# Patient Record
Sex: FEMALE | Race: OTHER | NOT HISPANIC OR LATINO | Employment: FULL TIME | URBAN - METROPOLITAN AREA
[De-identification: names, ages, dates, MRNs, and addresses within clinical notes are randomized per-mention and may not be internally consistent; named-entity substitution may affect disease eponyms.]

---

## 2018-09-21 LAB
INFLUENZA A ANTIGEN, POC: NEGATIVE
INFLUENZA B ANTIGEN, POC: NEGATIVE
RAPID GROUP A STREP (OHS): NEGATIVE

## 2019-03-26 LAB
INFLUENZA A ANTIGEN, POC: NEGATIVE
INFLUENZA B ANTIGEN, POC: NEGATIVE

## 2019-07-31 ENCOUNTER — HISTORICAL (OUTPATIENT)
Dept: URGENT CARE | Facility: CLINIC | Age: 49
End: 2019-07-31

## 2019-07-31 LAB
APPEARANCE, UA: CLEAR
BACTERIA SPEC CULT: ABNORMAL /HPF
BILIRUB SERPL-MCNC: NEGATIVE MG/DL
BILIRUB UR QL STRIP: NEGATIVE
BLOOD URINE, POC: NORMAL
COLOR UR: YELLOW
COLOR, POC UA: YELLOW
GLUCOSE (UA): NEGATIVE
GLUCOSE UR QL STRIP: NEGATIVE
HGB UR QL STRIP: ABNORMAL
KETONES UR QL STRIP: NEGATIVE
KETONES UR QL STRIP: NEGATIVE
LEUKOCYTE EST, POC UA: NEGATIVE
LEUKOCYTE ESTERASE UR QL STRIP: NEGATIVE
NITRITE UR QL STRIP: NEGATIVE
NITRITE, POC UA: NEGATIVE
PH UR STRIP: 5 [PH] (ref 5–9)
PH, POC UA: 5
PROT UR QL STRIP: NEGATIVE
PROTEIN, POC: NEGATIVE
RBC #/AREA URNS HPF: ABNORMAL /[HPF]
SP GR UR STRIP: 1.02 (ref 1–1.03)
SPECIFIC GRAVITY, POC UA: 1.01
SQUAMOUS EPITHELIAL, UA: ABNORMAL
UROBILINOGEN UR STRIP-ACNC: 0.2
UROBILINOGEN, POC UA: NORMAL
WBC #/AREA URNS HPF: ABNORMAL /[HPF]

## 2020-03-03 LAB
BILIRUB SERPL-MCNC: NEGATIVE MG/DL
BLOOD URINE, POC: NEGATIVE
CLARITY, POC UA: CLEAR
COLOR, POC UA: NORMAL
GLUCOSE UR QL STRIP: NEGATIVE
INFLUENZA A ANTIGEN, POC: NEGATIVE
INFLUENZA B ANTIGEN, POC: NEGATIVE
KETONES UR QL STRIP: NEGATIVE
LEUKOCYTE EST, POC UA: NEGATIVE
NITRITE, POC UA: NEGATIVE
PH, POC UA: 6
PROTEIN, POC: NORMAL
SPECIFIC GRAVITY, POC UA: 1.02
UROBILINOGEN, POC UA: NORMAL

## 2020-10-29 ENCOUNTER — HISTORICAL (OUTPATIENT)
Dept: RADIOLOGY | Facility: HOSPITAL | Age: 50
End: 2020-10-29

## 2020-11-02 ENCOUNTER — HISTORICAL (OUTPATIENT)
Dept: ADMINISTRATIVE | Facility: HOSPITAL | Age: 50
End: 2020-11-02

## 2020-11-04 ENCOUNTER — HISTORICAL (OUTPATIENT)
Dept: RADIOLOGY | Facility: HOSPITAL | Age: 50
End: 2020-11-04

## 2020-11-11 ENCOUNTER — HISTORICAL (OUTPATIENT)
Dept: RADIOLOGY | Facility: HOSPITAL | Age: 50
End: 2020-11-11

## 2021-01-28 ENCOUNTER — HISTORICAL (OUTPATIENT)
Dept: SURGERY | Facility: HOSPITAL | Age: 51
End: 2021-01-28

## 2021-01-28 LAB — POC BETA-HCG (QUAL): NEGATIVE

## 2021-07-30 ENCOUNTER — HISTORICAL (OUTPATIENT)
Dept: ADMINISTRATIVE | Facility: HOSPITAL | Age: 51
End: 2021-07-30

## 2021-07-30 LAB — SARS-COV-2 RNA RESP QL NAA+PROBE: NOT DETECTED

## 2021-08-23 LAB — SARS-COV-2 RNA RESP QL NAA+PROBE: NOT DETECTED

## 2021-08-24 ENCOUNTER — HISTORICAL (OUTPATIENT)
Dept: ADMINISTRATIVE | Facility: HOSPITAL | Age: 51
End: 2021-08-24

## 2021-10-16 ENCOUNTER — HISTORICAL (OUTPATIENT)
Dept: LAB | Facility: HOSPITAL | Age: 51
End: 2021-10-16

## 2021-10-16 LAB — TSH SERPL-ACNC: 0.73 UIU/ML (ref 0.35–4.94)

## 2021-11-12 ENCOUNTER — HISTORICAL (OUTPATIENT)
Dept: RADIOLOGY | Facility: HOSPITAL | Age: 51
End: 2021-11-12

## 2021-12-30 ENCOUNTER — HISTORICAL (OUTPATIENT)
Dept: LAB | Facility: HOSPITAL | Age: 51
End: 2021-12-30

## 2022-04-10 ENCOUNTER — HISTORICAL (OUTPATIENT)
Dept: ADMINISTRATIVE | Facility: HOSPITAL | Age: 52
End: 2022-04-10

## 2022-04-29 VITALS
SYSTOLIC BLOOD PRESSURE: 126 MMHG | DIASTOLIC BLOOD PRESSURE: 91 MMHG | OXYGEN SATURATION: 97 % | WEIGHT: 236.13 LBS | HEIGHT: 65 IN | BODY MASS INDEX: 39.34 KG/M2

## 2022-04-30 NOTE — OP NOTE
Patient:   Jadyn Wilder             MRN: [id number]            FIN: [id number]               Age:   51 years     Sex:  Female     :  1970   Associated Diagnoses:   None   Author:   Ezekiel Emery Jr, MD      SURGEON: Ezekiel Emery MD   ASSISTANT: KURT Zayas.  Ms. 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  Left knee arthroscopic partial lateral and medial meniscectomy  2.  Left knee arthroscopic shaving chondroplasty of medial femoral condyle and patellofemoral compartment    ESTIMATED BLOOD LOSS: 10cc.    COMPLICATIONS: None.    TOURNIQUET TIME: About 20 minutes.    ANTIBIOTICS: Ancef     INDICATIONS FOR PROCEDURE: Jadyn is a 51y.o. female who has had ongoing left knee pain. The patient has had to limit activity due to intermittent pain & occasional mechanical symptoms. An MRI was performed that showed a meniscus tear that I felt would be amenable to arthroscopic debridement. The patient decided to opt for surgery after failing conservative management.    OPERATIVE REPORT: Jadyn was initially seen in the preoperative holding area where history and physical were reviewed without change. The operative leg was marked, consents were reviewed, and any questions were answered for the patient and family. The patient was then taken back to the operating room, placed supine on the operating table with all bony prominences padded. Then a nonsterile tourniquet was placed around the upper thigh. The patient was induced under general anesthesia. The operative lower extremity was prepped and draped in standard sterile fashion. A timeout led by the surgeon was performed, and preoperative antibiotics were given. The limp was  exsanguinated with gravity. The tourniquet was raised to 100 mmHg over the systolic blood pressure.    The knee was then flexed and the inferolateral portal was created with an #11 blade scalpel.    The camera was introduced, using the blunt trocar, into the suprapatellar pouch. The undersurface of the patella was found to have grade 2 and 3 chondral wear and the trochlear groove was found to have grade 2 and 3 chondral wear . The camera was then taken down into the lateral gutter, where no loose bodies and no plica were found. The camera was then brought into the medial gutter, where no loose bodies and _ plica were seen. The camera was then brought into the medial compartment.    An inferomedial portal was then localized with a spinal needle, and created using an #11 blade. The medial meniscus was probed and found to have a complex tear involving the mid body. A combination of baskets and renita were used to debride the meniscal flap down to a stable margin of approximately 50 percent remaining and then shaver was used to smooth the edges. The medial femoral condyle was found to have scattered grade 2 and 3 cartilage wear.  Unstable flaps were abraded with a shaver and biter. The medial tibial plateau demonstrated minimal wear.    The camera was then turned to the notch, where the ACL was found to be intact.    Then the leg was brought into figure-of-four position. The camera was brought into the lateral compartment. The lateral meniscus was probed and found to have a parrot-beak tear of the anterior body. A combination of baskets and renita were used to debride the meniscal flap down to a stable margin of approximately 80 percent remaining and then shaver was used to smooth the edges. The lateral femoral condyle was found to have minimal chondral wear. The lateral tibial plateau had minimal chondral wear.    The camera was brought up into the patellofemoral joint once more, with the knee in extension and  shaving chondroplasty was performed on the patella and trochlea until smooth and stable cartilage margins were present.    The knee was once more examined for loose bodies, of which none were found. The knee was then evacuated of all fluids. The portals were closed with 3-0 monocyrl interrupted sutures and steristrips. 10mL of 0.25% Bupivicaine were infiltrated around each portal. A sterile dressing was placed, and the tourniquet was deflated after about 20 minutes. The patient was awakened from anesthesia and taken to the postoperative care unit in stable condition.

## 2022-05-02 NOTE — HISTORICAL [facility] CERNER
This is a historical note converted from Dannielle. Formatting and pictures may have been removed.  Please reference Dannielle for original formatting and attached multimedia. History of Present Illness  She is a pleasant 51-year-old whose had left knee pain?for greater than?1 year but increasing?since September 2020. ?She notes it worse with twisting?the knee. ?She is tried an injection in October 2020 with limited relief.? She has mechanical symptoms within the knee. ?She is tried anti-inflammatory medicines in the past. ?She denies any numbness or tingling [1]  ?   She returns today status post MRI. ?Her pains unchanged  Review of Systems  Comprehensive review of system?was performed with no exceptions other than noted in the history of present illness [1]  Physical Exam  Gen: WN, WD, NAD  Card/Res: NL breathing, +distal pulses  Abdomen: ND  Standing exam  stance: normal alignment, no significant leg-length discrepancy  gait:?Mild antalgic limp  ?   Knee examination  - General comments: unremarkable appearance  ?   - Tenderness: Lateral joint line  ?   Knee??????????RIGHT??????????LEFT  Skin: ??????????Intact ??????????Intact  ROM:??????????0-130??????????0-130  Effusion:????? Neg ???????????? +  MJL TTP:????? Neg ???????????? Neg  LJL TTP: ?????? Neg ???????????? +  Amara:? ?Neg ????????????+  Pat crep:?????? Neg ???????????????Neg  Patella TTPs: Neg ???????????????Neg  Patella grind: Neg??????????? ?Neg  Lachman: ?????Neg ????????????????????Neg  Pivot shift: ?????Neg ???????????? Neg  Valgus stress: Neg ???????????????Neg  Varus stress: Neg ???????????????Neg  Posterior drawer: Neg ??????????Neg  ?   N-V ????????????????????intact??????????intact  Hip:?????????????????????????nml?????????? nml  ?   Lower extremity edema:Negative [2]  Assessment/Plan  1.?Tear of lateral meniscus of left knee?S83.282A  ??I recommend surgical intervention: Left knee arthroscopic partial lateral meniscectomy. ?We discussed the  details of the procedure and the expected postoperative course.? We discussed the benefits of surgery which be to decrease?her knee pain and increase her function. ?We discussed the risk of surgery which is small but could be significant?if she has continued pain, stiffness, or infection.? After discussion she like to proceed. [3]   Problem List/Past Medical History  Ongoing  2019-nCoV  Bilateral chronic conjunctivitis of eyes  Mitral valve regurgitation  Obesity  Tear of lateral meniscus of left knee  Historical  Tobacco user  Procedure/Surgical History  Tubal ligation   Medications  Inpatient  No active inpatient medications  Home  ALPRAZOLAM 0.5 MG TABLET, 0.5 mg= 1 tab(s), Oral, Daily  cholecalciferol 50,000 intl units oral capsule, 01867 IntUnit= 1 cap(s), Oral, qWeek,? ?Not Taking per Prescriber: Last Dose Date/Time Unknown  FLUOXETINE HCL 20 MG CAPSULE, 20 mg= 1 cap(s), Oral, Daily  meloxicam 7.5 mg oral tablet, See Instructions, 1 refills  ProAir HFA 90 mcg/inh inhalation aerosol with adapter, 1 puff(s), INH, q4hr, PRN, 3 refills,? ?Not taking: Last Dose Date/Time Unknown  Allergies  No Known Allergies  No Known Medication Allergies  Social History  Abuse/Neglect  No, 01/13/2021  Alcohol  Current, 1-2 times per month, 09/21/2018  Substance Use  Never, 09/21/2018  Tobacco  Former smoker, quit more than 30 days ago, N/A, 01/13/2021  Family History  Diabetes mellitus type 2: Mother and Grandfather.  Hypertension.: Mother and Father.  Immunizations  Vaccine Date Status Comments   COVID-19 MRNA, LNP-S, PF- Pfizer 01/20/2021 Given ADMINISTERED BY SILVER ARMAS LPN.      [1]?Office Visit Note; Ezekiel Emery JR., MD 11/16/2020 08:49 CST  [2]?Office Visit Note; Ezekiel Emery JR., MD 11/16/2020 08:49 CST  [3] Office Visit Note; Ezekiel Emery JR., MD 11/16/2020 08:49 CST

## 2022-06-02 ENCOUNTER — HOSPITAL ENCOUNTER (OUTPATIENT)
Dept: RADIOLOGY | Facility: HOSPITAL | Age: 52
Discharge: HOME | End: 2022-06-02
Payer: COMMERCIAL

## 2022-06-02 ENCOUNTER — HOSPITAL ENCOUNTER (OUTPATIENT)
Dept: RADIOLOGY | Facility: HOSPITAL | Age: 52
Discharge: HOME | End: 2022-06-02
Attending: STUDENT IN AN ORGANIZED HEALTH CARE EDUCATION/TRAINING PROGRAM
Payer: COMMERCIAL

## 2022-06-02 DIAGNOSIS — M54.59 OTHER LOW BACK PAIN: ICD-10-CM

## 2022-06-02 DIAGNOSIS — M54.2 NECK PAIN: ICD-10-CM

## 2022-06-02 DIAGNOSIS — R31.9 URINE BLOOD: Primary | ICD-10-CM

## 2022-06-02 DIAGNOSIS — R31.9 URINE BLOOD: ICD-10-CM

## 2022-06-02 DIAGNOSIS — M54.2 NECK PAIN: Primary | ICD-10-CM

## 2022-06-02 PROCEDURE — 72070 X-RAY EXAM THORAC SPINE 2VWS: CPT | Mod: TC

## 2022-06-02 PROCEDURE — 74176 CT ABD & PELVIS W/O CONTRAST: CPT | Mod: TC

## 2022-06-02 PROCEDURE — 72100 X-RAY EXAM L-S SPINE 2/3 VWS: CPT | Mod: TC

## 2022-06-02 PROCEDURE — 72040 X-RAY EXAM NECK SPINE 2-3 VW: CPT | Mod: TC

## 2022-06-19 ENCOUNTER — HOSPITAL ENCOUNTER (EMERGENCY)
Facility: HOSPITAL | Age: 52
Discharge: HOME | End: 2022-06-19
Attending: EMERGENCY MEDICINE
Payer: COMMERCIAL

## 2022-06-19 VITALS
WEIGHT: 230 LBS | HEIGHT: 65 IN | BODY MASS INDEX: 38.32 KG/M2 | SYSTOLIC BLOOD PRESSURE: 134 MMHG | OXYGEN SATURATION: 100 % | DIASTOLIC BLOOD PRESSURE: 96 MMHG | RESPIRATION RATE: 15 BRPM | TEMPERATURE: 98 F | HEART RATE: 99 BPM

## 2022-06-19 DIAGNOSIS — M25.579 ANKLE PAIN: ICD-10-CM
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
## 2022-06-19 DIAGNOSIS — S82.841A BIMALLEOLAR ANKLE FRACTURE, RIGHT, CLOSED, INITIAL ENCOUNTER: Primary | ICD-10-CM

## 2022-06-19 PROCEDURE — 96374 THER/PROPH/DIAG INJ IV PUSH: CPT

## 2022-06-19 PROCEDURE — 99285 EMERGENCY DEPT VISIT HI MDM: CPT | Mod: 25

## 2022-06-19 PROCEDURE — 99152 MOD SED SAME PHYS/QHP 5/>YRS: CPT

## 2022-06-19 PROCEDURE — 63600175 PHARM REV CODE 636 W HCPCS: Performed by: EMERGENCY MEDICINE

## 2022-06-19 PROCEDURE — 27810 TREATMENT OF ANKLE FRACTURE: CPT | Mod: RT

## 2022-06-19 RX ORDER — PROPOFOL 10 MG/ML
150 VIAL (ML) INTRAVENOUS ONCE
Status: COMPLETED | OUTPATIENT
Start: 2022-06-19 | End: 2022-06-19

## 2022-06-19 RX ORDER — HYDROCODONE BITARTRATE AND ACETAMINOPHEN 7.5; 325 MG/1; MG/1
1 TABLET ORAL EVERY 4 HOURS PRN
Qty: 18 TABLET | Refills: 0 | Status: ON HOLD | OUTPATIENT
Start: 2022-06-19 | End: 2022-06-23 | Stop reason: HOSPADM

## 2022-06-19 RX ORDER — PROPOFOL 10 MG/ML
100 VIAL (ML) INTRAVENOUS ONCE
Status: DISCONTINUED | OUTPATIENT
Start: 2022-06-19 | End: 2022-06-19

## 2022-06-19 RX ORDER — CEFAZOLIN SODIUM 1 G/3ML
1 INJECTION, POWDER, FOR SOLUTION INTRAMUSCULAR; INTRAVENOUS
Status: COMPLETED | OUTPATIENT
Start: 2022-06-19 | End: 2022-06-19

## 2022-06-19 RX ORDER — ONDANSETRON 4 MG/1
4 TABLET, FILM COATED ORAL EVERY 6 HOURS PRN
Qty: 12 TABLET | Refills: 0 | Status: SHIPPED | OUTPATIENT
Start: 2022-06-19 | End: 2023-02-03

## 2022-06-19 RX ORDER — IBUPROFEN 800 MG/1
800 TABLET ORAL EVERY 8 HOURS PRN
Qty: 20 TABLET | Refills: 0 | Status: ON HOLD | OUTPATIENT
Start: 2022-06-19 | End: 2022-06-23 | Stop reason: HOSPADM

## 2022-06-19 RX ADMIN — CEFAZOLIN 1 G: 330 INJECTION, POWDER, FOR SOLUTION INTRAMUSCULAR; INTRAVENOUS at 12:06

## 2022-06-19 RX ADMIN — PROPOFOL 150 MG: 10 INJECTION, EMULSION INTRAVENOUS at 02:06

## 2022-06-19 NOTE — ED PROVIDER NOTES
Encounter Date: 6/19/2022       History     Chief Complaint   Patient presents with    Fall    Ankle Pain     52-year-old female was walking down stairs when she fell injuring her right ankle.  She denies any injuries to any other part of her body and just has ankle pain.  She states that she has been nibbling and eating continuously this evening, and has drink some alcohol.  She denies any illicit drugs.  She has no medical problems, has had a bilateral tubal ligation so she is not pregnant, and states she has had no problems with anesthesia in the past.        Review of patient's allergies indicates:  No Known Allergies  No past medical history on file.  No past surgical history on file.  No family history on file.     Review of Systems   Musculoskeletal: Positive for arthralgias.   All other systems reviewed and are negative.      Physical Exam     Initial Vitals [06/19/22 0018]   BP Pulse Resp Temp SpO2   115/73 100 18 98.2 °F (36.8 °C) 98 %      MAP       --         Physical Exam    Nursing note and vitals reviewed.  Constitutional: She appears well-developed and well-nourished. She is not diaphoretic. No distress.   HENT:   Head: Normocephalic and atraumatic.   Mouth/Throat: Oropharynx is clear and moist.   Eyes: Conjunctivae are normal. Pupils are equal, round, and reactive to light.   Neck: Neck supple.   Cardiovascular: Normal rate, regular rhythm, normal heart sounds and intact distal pulses.   Pulmonary/Chest: Breath sounds normal. No respiratory distress. She has no wheezes. She has no rhonchi. She has no rales.   Abdominal: Abdomen is soft. Bowel sounds are normal. She exhibits no distension. There is no abdominal tenderness. There is no guarding.   Musculoskeletal:         General: No tenderness or edema.      Cervical back: Neck supple.      Comments: Right ankle has deformity consistent with fracture dislocation.  2+ pedal pulse, normal sensation to the toes, brisk capillary refill.    Abrasion  "noted to the lateral ankle which does not appear to be an open fracture but it was cleaned, antibiotic ointment applied, patient given 1 dose of antibiotics.    No tenderness to the right foot or right lower leg.  No sign of any other extremity injury.     Neurological: She is alert and oriented to person, place, and time.   Skin: Skin is warm and dry. Capillary refill takes less than 2 seconds. No rash noted.   Psychiatric: She has a normal mood and affect. Thought content normal.         ED Course   Procedural Sedation        Date/Time: 2022 1:52 AM  Performed by: Emelyn Avalos MD  Authorized by: Emelyn Avalos MD   Consent Done: Yes  Consent: Verbal consent not obtained. Written consent obtained.  Risks and benefits: risks, benefits and alternatives were discussed  Consent given by: patient  Patient understanding: patient states understanding of the procedure being performed  Patient consent: the patient's understanding of the procedure matches consent given  Procedure consent: procedure consent matches procedure scheduled  Relevant documents: relevant documents present and verified  Site marked: the operative site was marked  Imaging studies: imaging studies available  Patient identity confirmed: , MRN, name, provided demographic data and verbally with patient  Time out: Immediately prior to procedure a "time out" was called to verify the correct patient, procedure, equipment, support staff and site/side marked as required.  ASA Class: Class 1 - Heathy patient. No medical history.  Mallampati Score: Class 1 - Visualization of the soft palate, fauces, uvula, and anterior/posterior pillars.   NPO STATUS:  Date/Time of last solid: 2022 2:45 AM  Contents of last solid: snacking all night  Date/Time of last fluid: 2022 2:45 AM  Contents of last fluid: beer    Equipment: on cardiac monitor., on BP monitor., on supplemental oxygen., suction available. and airway equipment available. "     Sedation type: moderate (conscious) sedation    Sedatives: propofol  Sedation start date/time: 2022 2:30 AM  Sedation end date/time: 2022 2:40 AM  Total Sedation Time (min): 10  Vitals: Vital signs were monitored during sedation.  Complications: No complications.   Comments: Patient was initially given 100 mg of propofol with effect.  We gave her another 50 mg of propofol with some sedation but she still continued to talk was awake but tolerated the procedure well.  Patient/Family history of anesthesia or sedation complications: No  Orthopedic Injury    Date/Time: 2022 2:30 AM  Performed by: Emelyn Avalos MD  Authorized by: Emelyn Avalos MD     Location procedure was performed:  [facility] EMERGENCY DEPARTMENT  Pre-operative diagnosis:  Right ankle fracture dislocation closed  Post-operative diagnosis:  Right ankle fracture dislocation closed  Consent Done?:  Yes  Universal Protocol:     Verbal consent obtained?: Yes      Written consent obtained?: Yes      Risks and benefits: Risks, benefits and alternatives were discussed      Consent given by:  Patient    Patient states understanding of procedure being performed: Yes      Patient's understanding of procedure matches consent: Yes      Procedure consent matches procedure scheduled: Yes      Relevant documents present and verified: Yes      Test results available and properly labeled: Yes      Imaging studies available: Yes      Patient identity confirmed:  , MRN, name, provided demographic data and verbally with patient  Injury:     Injury location:  Ankle      Pre-procedure assessment:     Distal perfusion: normal      Neurological function: normal      Local anesthesia used?: No      Patient sedated?: Yes      ASA Class:  Class 1 - Heathy patient. No medical history.    Mallampati Score:  Class 1 - Visualization of the soft palate, fauces, uvula, and anterior/posterior pillars.      Selections made in this section will also lock the  Injury type section above.:     Immobilization:  Splint    Technical Procedures Used:  Reduction was performed with traction and then pressure on the lateral malleolus and medial heel, post reduction x-ray showed good in alignment    Complications: No      Specimens: No      Implants: No    Post-procedure assessment:     Neurovascular status: Neurovascularly intact      Distal perfusion: normal      Neurological function: normal      Range of motion: splinted      Patient tolerance:  Patient tolerated the procedure well with no immediate complications     Posterior splint and stirrup splint applied for stability.      Labs Reviewed - No data to display       Imaging Results          X-Ray Ankle Complete Right (Final result)  Result time 06/19/22 06:43:21    Final result by Claudio Andrews MD (06/19/22 06:43:21)                 Impression:      Reduction of trimalleolar fracture dislocation with close to anatomical position and alignment of the visualized osseous structures.      Electronically signed by: Claudio Andrews  Date:    06/19/2022  Time:    06:43             Narrative:    EXAMINATION:  XR ANKLE COMPLETE 3 VIEW RIGHT    CLINICAL HISTORY:  Pain in unspecified ankle and joints of unspecified foot    COMPARISON:  None.    FINDINGS:  Reduction of fracture dislocation of the ankle with close to anatomical position and alignment of the visualized osseous structures                               X-Ray Ankle Complete Right (Final result)  Result time 06/19/22 06:42:21    Final result by Claudio Andrews MD (06/19/22 06:42:21)                 Impression:      Trimalleolar fracture dislocation with significant displacement and over riding of fracture fragments.      Electronically signed by: Claudio Andrews  Date:    06/19/2022  Time:    06:42             Narrative:    EXAMINATION:  XR ANKLE COMPLETE 3 VIEW RIGHT    CLINICAL HISTORY:  Unspecified fall, initial  encounter    COMPARISON:  None.    FINDINGS:  Comminuted displaced fracture dislocation with a tri malleolar component    Joint spaces preserved.    No blastic or lytic lesions.    Soft tissues within normal limits.                               X-Ray Foot Complete Right (Final result)  Result time 06/19/22 07:42:04    Final result by Claudio Andrews MD (06/19/22 07:42:04)                 Impression:      Trimalleolar fracture dislocation of the ankle    No other fractures.      Electronically signed by: Claudio Andrews  Date:    06/19/2022  Time:    07:42             Narrative:    EXAMINATION:  XR FOOT COMPLETE 3 VIEW RIGHT    CLINICAL HISTORY:  Unspecified fall, initial encounter    COMPARISON:  None.    FINDINGS:  Trimalleolar fracture dislocation of the ankle with no other significant fractures or dislocations identified    Joint spaces preserved.    No blastic or lytic lesions.    Soft tissues within normal limits.                                 Medications   ceFAZolin injection 1 g (1 g Intravenous Given 6/19/22 0057)   propofol (DIPRIVAN) 10 mg/mL IVP (150 mg Intravenous Given 6/19/22 0245)     Medical Decision Making:   Initial Assessment:   52-year-old female fell and has obvious left ankle deformity with abrasion over the lateral ankle, no bleeding, does not appear to be an open fracture  Differential Diagnosis:   Fracture, dislocation, open fracture  ED Management:  Patient was given 1 dose of antibiotics in the abrasion to her ankle was cleaned and bandaged.  Procedure old sedation was performed after consent and appropriate preparation and the fracture dislocation was reduced.  Splint was applied which was a posterior splint and a stirrup splint and post splint evaluation revealed good capillary refill to the toes, normal sensation, patient states the splint feels good.  After an appropriate amount of time passed in the sedation had completely worn off, patient was discharge.  Discharge  instructions were discussed and she will call the orthopedist on Monday to schedule her follow-up appointment.                      Clinical Impression:   Final diagnoses:  [W19.XXXA] Fall  [M25.579] Ankle pain  [S82.841A] Bimalleolar ankle fracture, right, closed, initial encounter (Primary)          ED Disposition Condition    Discharge Stable        ED Prescriptions     Medication Sig Dispense Start Date End Date Auth. Provider    ondansetron (ZOFRAN) 4 MG tablet Take 1 tablet (4 mg total) by mouth every 6 (six) hours as needed for Nausea. 12 tablet 6/19/2022  Emelyn Avalos MD    ibuprofen (ADVIL,MOTRIN) 800 MG tablet Take 1 tablet (800 mg total) by mouth every 8 (eight) hours as needed for Pain. 20 tablet 6/19/2022  Emelyn Avalos MD    HYDROcodone-acetaminophen (NORCO) 7.5-325 mg per tablet Take 1 tablet by mouth every 4 (four) hours as needed for Pain. 18 tablet 6/19/2022  Emelyn Avalos MD        Follow-up Information     Follow up With Specialties Details Why Contact Info    Bruce Mayberry MD Orthopedic Surgery Call in 2 days  [address]  [address]  [facility] [id number] [phone]             Emelyn Avalos MD  06/20/22 [id number]

## 2022-06-19 NOTE — Clinical Note
"Jadyn Sanchezmatt Wilder was seen and treated in our emergency department on 6/19/2022.  She may return to work on 06/27/2022.       If you have any questions or concerns, please don't hesitate to call.      Fara Bowen RN    "

## 2022-06-20 ENCOUNTER — HOSPITAL ENCOUNTER (OUTPATIENT)
Dept: RADIOLOGY | Facility: CLINIC | Age: 52
Discharge: HOME | End: 2022-06-20
Attending: ORTHOPAEDIC SURGERY
Payer: COMMERCIAL

## 2022-06-20 ENCOUNTER — OFFICE VISIT (OUTPATIENT)
Dept: ORTHOPEDICS | Facility: CLINIC | Age: 52
End: 2022-06-20
Payer: COMMERCIAL

## 2022-06-20 VITALS
DIASTOLIC BLOOD PRESSURE: 96 MMHG | HEIGHT: 65 IN | WEIGHT: 230 LBS | BODY MASS INDEX: 38.32 KG/M2 | SYSTOLIC BLOOD PRESSURE: 134 MMHG

## 2022-06-20 DIAGNOSIS — M25.572 ACUTE LEFT ANKLE PAIN: ICD-10-CM

## 2022-06-20 DIAGNOSIS — S82.851A CLOSED TRIMALLEOLAR FRACTURE OF RIGHT ANKLE, INITIAL ENCOUNTER: Primary | ICD-10-CM

## 2022-06-20 DIAGNOSIS — S93.492A SPRAIN OF ANTERIOR TALOFIBULAR LIGAMENT OF LEFT ANKLE, INITIAL ENCOUNTER: ICD-10-CM

## 2022-06-20 PROCEDURE — 73610 X-RAY EXAM OF ANKLE: CPT | Mod: LT,,, | Performed by: ORTHOPAEDIC SURGERY

## 2022-06-20 PROCEDURE — 3008F BODY MASS INDEX DOCD: CPT | Mod: CPTII,,, | Performed by: ORTHOPAEDIC SURGERY

## 2022-06-20 PROCEDURE — 73610 XR ANKLE COMPLETE 3 VIEW LEFT: ICD-10-PCS | Mod: LT,,, | Performed by: ORTHOPAEDIC SURGERY

## 2022-06-20 PROCEDURE — 1159F PR MEDICATION LIST DOCUMENTED IN MEDICAL RECORD: ICD-10-PCS | Mod: CPTII,,, | Performed by: ORTHOPAEDIC SURGERY

## 2022-06-20 PROCEDURE — 3075F SYST BP GE 130 - 139MM HG: CPT | Mod: CPTII,,, | Performed by: ORTHOPAEDIC SURGERY

## 2022-06-20 PROCEDURE — 3080F PR MOST RECENT DIASTOLIC BLOOD PRESSURE >= 90 MM HG: ICD-10-PCS | Mod: CPTII,,, | Performed by: ORTHOPAEDIC SURGERY

## 2022-06-20 PROCEDURE — 99213 PR OFFICE/OUTPT VISIT, EST, LEVL III, 20-29 MIN: ICD-10-PCS | Mod: ,,, | Performed by: ORTHOPAEDIC SURGERY

## 2022-06-20 PROCEDURE — 3008F PR BODY MASS INDEX (BMI) DOCUMENTED: ICD-10-PCS | Mod: CPTII,,, | Performed by: ORTHOPAEDIC SURGERY

## 2022-06-20 PROCEDURE — 3080F DIAST BP >= 90 MM HG: CPT | Mod: CPTII,,, | Performed by: ORTHOPAEDIC SURGERY

## 2022-06-20 PROCEDURE — 4010F ACE/ARB THERAPY RXD/TAKEN: CPT | Mod: CPTII,,, | Performed by: ORTHOPAEDIC SURGERY

## 2022-06-20 PROCEDURE — 1159F MED LIST DOCD IN RCRD: CPT | Mod: CPTII,,, | Performed by: ORTHOPAEDIC SURGERY

## 2022-06-20 PROCEDURE — 4010F PR ACE/ARB THEARPY RXD/TAKEN: ICD-10-PCS | Mod: CPTII,,, | Performed by: ORTHOPAEDIC SURGERY

## 2022-06-20 PROCEDURE — 3075F PR MOST RECENT SYSTOLIC BLOOD PRESS GE 130-139MM HG: ICD-10-PCS | Mod: CPTII,,, | Performed by: ORTHOPAEDIC SURGERY

## 2022-06-20 PROCEDURE — 99213 OFFICE O/P EST LOW 20 MIN: CPT | Mod: ,,, | Performed by: ORTHOPAEDIC SURGERY

## 2022-06-20 RX ORDER — CIPROFLOXACIN 500 MG/1
500 TABLET ORAL 2 TIMES DAILY
COMMUNITY
Start: 2022-06-02 | End: 2023-02-03

## 2022-06-20 RX ORDER — HYDROCODONE BITARTRATE AND ACETAMINOPHEN 10; 325 MG/1; MG/1
1 TABLET ORAL
COMMUNITY
End: 2022-06-20 | Stop reason: SDUPTHER

## 2022-06-20 RX ORDER — SODIUM CHLORIDE 9 MG/ML
INJECTION, SOLUTION INTRAVENOUS CONTINUOUS
Status: CANCELLED | OUTPATIENT
Start: 2022-06-20

## 2022-06-20 RX ORDER — FLUOXETINE HYDROCHLORIDE 40 MG/1
40 CAPSULE ORAL DAILY
COMMUNITY
Start: 2022-06-01 | End: 2023-07-11 | Stop reason: SDUPTHER

## 2022-06-20 RX ORDER — AMLODIPINE AND VALSARTAN 5; 160 MG/1; MG/1
1 TABLET ORAL DAILY
COMMUNITY
Start: 2022-05-27 | End: 2023-06-06 | Stop reason: SDUPTHER

## 2022-06-20 RX ORDER — ONDANSETRON 4 MG/1
4 TABLET, ORALLY DISINTEGRATING ORAL EVERY 6 HOURS PRN
Status: ON HOLD | COMMUNITY
Start: 2022-06-19 | End: 2022-06-23 | Stop reason: HOSPADM

## 2022-06-20 RX ORDER — LISDEXAMFETAMINE DIMESYLATE 40 MG/1
40 CAPSULE ORAL EVERY MORNING
COMMUNITY
Start: 2022-05-29 | End: 2023-07-11

## 2022-06-20 RX ORDER — HYDROCODONE BITARTRATE AND ACETAMINOPHEN 10; 325 MG/1; MG/1
1 TABLET ORAL EVERY 6 HOURS PRN
Qty: 28 TABLET | Refills: 0 | Status: SHIPPED | OUTPATIENT
Start: 2022-06-20 | End: 2022-06-21 | Stop reason: SDUPTHER

## 2022-06-20 NOTE — PROGRESS NOTES
Chief Complaint:   Chief Complaint   Patient presents with    Right Ankle - Pain    Left Ankle - Pain    Pain     JENNY ankle pain, DOI: 6/18/22 patient slipped and fell on some stairs saturday night       Consulting Physician: No ref. provider found    History of present illness:    she  is a pleasant 52 y.o. year old female who fell on 06/18/2022.  She slipped down the stairs.  She had immediate pain and deformity.  She was initially seen in the emergency department radiographs showed a displaced trimalleolar ankle fracture.  She underwent close reduction.  She has been in the splint.  She complains of pain around the ankle itself.  It is worse with motion.  It is better at rest.  She denies any numbness or tingling.  She has also had some left ankle pain and swelling.    History reviewed. No pertinent past medical history.    Past Surgical History:   Procedure Laterality Date    KNEE ARTHROSCOPY      KNEE SURGERY      TUBAL LIGATION         Current Outpatient Medications   Medication Sig    amlodipine-valsartan (EXFORGE) 5-160 mg per tablet Take 1 tablet by mouth once daily.    ciprofloxacin HCl (CIPRO) 500 MG tablet Take 500 mg by mouth 2 (two) times daily.    FLUoxetine 40 MG capsule     HYDROcodone-acetaminophen (NORCO) 7.5-325 mg per tablet Take 1 tablet by mouth every 4 (four) hours as needed for Pain.    ibuprofen (ADVIL,MOTRIN) 800 MG tablet Take 1 tablet (800 mg total) by mouth every 8 (eight) hours as needed for Pain.    ondansetron (ZOFRAN) 4 MG tablet Take 1 tablet (4 mg total) by mouth every 6 (six) hours as needed for Nausea.    ondansetron (ZOFRAN-ODT) 4 MG TbDL Take 4 mg by mouth every 6 (six) hours as needed.    VYVANSE 40 mg Cap Take 40 mg by mouth every morning.     No current facility-administered medications for this visit.       Review of patient's allergies indicates:  No Known Allergies    History reviewed. No pertinent family history.    Social History     Socioeconomic History  "   Marital status: Significant Other   Tobacco Use    Smoking status: Never Smoker    Smokeless tobacco: Never Used       Review of Systems:    Constitution:   Denies chills, fever, and sweats.  HENT:   Denies headaches or blurry vision.  Cardiovascular:  Denies chest pain or irregular heart beat.  Respiratory:   Denies cough or shortness of breath.  Gastrointestinal:  Denies abdominal pain, nausea, or vomiting.  Musculoskeletal:   Denies muscle cramps.  Neurological:   Denies dizziness or focal weakness.  Psychiatric/Behavior: Normal mental status.  Hematology/Lymph:  Denies bleeding problem or easy bruising/bleeding.  Skin:    Denies rash or suspicious lesions.    Examination:    Vital Signs:    Vitals:    06/20/22 1022 06/20/22 1023   BP: (!) 134/96    Weight: 104.3 kg (230 lb)    Height: 5' 5" (1.651 m)    PainSc:  10-Worst pain ever       Body mass index is 38.27 kg/m².    Constitution:   Well-developed, well nourished patient in no acute distress.  Neurological:   Alert and oriented x 3 and cooperative to examination.     Psychiatric/Behavior: Normal mental status.  Respiratory:   No shortness of breath.  Eyes:    Extraoccular muscles intact  Skin:    No scars, rash or suspicious lesions.    MSK:   Focused exam of her bilateral ankle shows the right ankle in a splint.  She is able wiggle her toes.  Expected swelling.  Distally she is neurovascularly intact.  Left ankle shows swelling and ecchymosis laterally.  She is tender over lateral ligaments.  Full range of motion the ankle.  Distally she is neurovascular intact    Imaging: X-rays ordered and images interpreted today personally by me of three views left ankle show no fracture        Assessment: Sprain of anterior talofibular ligament of left ankle, initial encounter  -     X-Ray Ankle Complete Left; Future; Expected date: 06/20/2022    Closed trimalleolar fracture of right ankle, initial encounter  -     CT Ankle (Including Hindfoot) Without Contrast " Right; Future; Expected date: 06/20/2022        Plan:  I have recommended surgical intervention:  Open reduction internal fixation of the right trimalleolar ankle fracture.  We discussed the details of the procedure and expected postoperative course.  We discussed the benefits of surgery which be decrease her pain and increase function.  We discussed the risks of surgery which is small but could be significant she has continued pain, stiffness, or infection.  After discussion she would like to proceed.  Get a CT scan to better characterize her posterior malleolus fracture.  Continue plans for surgery Friday June 24

## 2022-06-21 ENCOUNTER — PATIENT MESSAGE (OUTPATIENT)
Dept: ADMINISTRATIVE | Facility: OTHER | Age: 52
End: 2022-06-21
Payer: COMMERCIAL

## 2022-06-21 DIAGNOSIS — S82.851A CLOSED TRIMALLEOLAR FRACTURE OF RIGHT ANKLE, INITIAL ENCOUNTER: Primary | ICD-10-CM

## 2022-06-21 RX ORDER — HYDROCODONE BITARTRATE AND ACETAMINOPHEN 10; 325 MG/1; MG/1
1 TABLET ORAL EVERY 6 HOURS PRN
Qty: 28 TABLET | Refills: 0 | Status: ON HOLD | OUTPATIENT
Start: 2022-06-21 | End: 2022-06-23 | Stop reason: HOSPADM

## 2022-06-22 ENCOUNTER — PATIENT MESSAGE (OUTPATIENT)
Dept: ADMINISTRATIVE | Facility: OTHER | Age: 52
End: 2022-06-22
Payer: COMMERCIAL

## 2022-06-22 ENCOUNTER — OFFICE VISIT (OUTPATIENT)
Dept: ORTHOPEDICS | Facility: CLINIC | Age: 52
End: 2022-06-22
Payer: COMMERCIAL

## 2022-06-22 ENCOUNTER — ANESTHESIA EVENT (OUTPATIENT)
Dept: SURGERY | Facility: HOSPITAL | Age: 52
End: 2022-06-22
Payer: COMMERCIAL

## 2022-06-22 VITALS
HEART RATE: 99 BPM | HEIGHT: 65 IN | DIASTOLIC BLOOD PRESSURE: 96 MMHG | SYSTOLIC BLOOD PRESSURE: 134 MMHG | WEIGHT: 230 LBS | BODY MASS INDEX: 38.32 KG/M2

## 2022-06-22 DIAGNOSIS — S82.852A CLOSED TRIMALLEOLAR FRACTURE OF LEFT ANKLE, INITIAL ENCOUNTER: Primary | ICD-10-CM

## 2022-06-22 PROCEDURE — 3080F DIAST BP >= 90 MM HG: CPT | Mod: CPTII,,, | Performed by: ORTHOPAEDIC SURGERY

## 2022-06-22 PROCEDURE — 4010F PR ACE/ARB THEARPY RXD/TAKEN: ICD-10-PCS | Mod: CPTII,,, | Performed by: ORTHOPAEDIC SURGERY

## 2022-06-22 PROCEDURE — 3075F SYST BP GE 130 - 139MM HG: CPT | Mod: CPTII,,, | Performed by: ORTHOPAEDIC SURGERY

## 2022-06-22 PROCEDURE — 99213 OFFICE O/P EST LOW 20 MIN: CPT | Mod: 57,,, | Performed by: ORTHOPAEDIC SURGERY

## 2022-06-22 PROCEDURE — 4010F ACE/ARB THERAPY RXD/TAKEN: CPT | Mod: CPTII,,, | Performed by: ORTHOPAEDIC SURGERY

## 2022-06-22 PROCEDURE — 99213 PR OFFICE/OUTPT VISIT, EST, LEVL III, 20-29 MIN: ICD-10-PCS | Mod: 57,,, | Performed by: ORTHOPAEDIC SURGERY

## 2022-06-22 PROCEDURE — 3008F BODY MASS INDEX DOCD: CPT | Mod: CPTII,,, | Performed by: ORTHOPAEDIC SURGERY

## 2022-06-22 PROCEDURE — 3080F PR MOST RECENT DIASTOLIC BLOOD PRESSURE >= 90 MM HG: ICD-10-PCS | Mod: CPTII,,, | Performed by: ORTHOPAEDIC SURGERY

## 2022-06-22 PROCEDURE — 3075F PR MOST RECENT SYSTOLIC BLOOD PRESS GE 130-139MM HG: ICD-10-PCS | Mod: CPTII,,, | Performed by: ORTHOPAEDIC SURGERY

## 2022-06-22 PROCEDURE — 1159F PR MEDICATION LIST DOCUMENTED IN MEDICAL RECORD: ICD-10-PCS | Mod: CPTII,,, | Performed by: ORTHOPAEDIC SURGERY

## 2022-06-22 PROCEDURE — 3008F PR BODY MASS INDEX (BMI) DOCUMENTED: ICD-10-PCS | Mod: CPTII,,, | Performed by: ORTHOPAEDIC SURGERY

## 2022-06-22 PROCEDURE — 1159F MED LIST DOCD IN RCRD: CPT | Mod: CPTII,,, | Performed by: ORTHOPAEDIC SURGERY

## 2022-06-22 NOTE — PROGRESS NOTES
Chief Complaint:   Chief Complaint   Patient presents with    Right Ankle - Pain    Results     Right ankle CT results       Consulting Physician: No ref. provider found    History of present illness:  she  is a pleasant 52 y.o. year old female who fell on 06/18/2022.  She slipped down the stairs.  She had immediate pain and deformity.  She was initially seen in the emergency department radiographs showed a displaced trimalleolar ankle fracture.  She underwent close reduction.  She has been in the splint.  She complains of pain around the ankle itself.  It is worse with motion.  It is better at rest.  She denies any numbness or tingling.  She has also had some left ankle pain and swelling.    She returns today status post CT scan.    Past Medical History:   Diagnosis Date    ADD (attention deficit disorder)     Anxiety     and depression    Hypertension        Past Surgical History:   Procedure Laterality Date    KNEE ARTHROSCOPY      TUBAL LIGATION         Current Outpatient Medications   Medication Sig    amlodipine-valsartan (EXFORGE) 5-160 mg per tablet Take 1 tablet by mouth once daily.    ciprofloxacin HCl (CIPRO) 500 MG tablet Take 500 mg by mouth 2 (two) times daily.    FLUoxetine 40 MG capsule     HYDROcodone-acetaminophen (NORCO)  mg per tablet Take 1 tablet by mouth every 6 (six) hours as needed for Pain.    HYDROcodone-acetaminophen (NORCO) 7.5-325 mg per tablet Take 1 tablet by mouth every 4 (four) hours as needed for Pain.    ibuprofen (ADVIL,MOTRIN) 800 MG tablet Take 1 tablet (800 mg total) by mouth every 8 (eight) hours as needed for Pain.    ondansetron (ZOFRAN) 4 MG tablet Take 1 tablet (4 mg total) by mouth every 6 (six) hours as needed for Nausea.    ondansetron (ZOFRAN-ODT) 4 MG TbDL Take 4 mg by mouth every 6 (six) hours as needed.    VYVANSE 40 mg Cap Take 40 mg by mouth every morning.     No current facility-administered medications for this visit.       Review of  "patient's allergies indicates:  No Known Allergies    No family history on file.    Social History     Socioeconomic History    Marital status: Significant Other   Tobacco Use    Smoking status: Never Smoker    Smokeless tobacco: Never Used   Substance and Sexual Activity    Alcohol use: Yes     Alcohol/week: 1.0 standard drink     Types: 1 Glasses of wine per week     Comment: occas    Drug use: Never    Sexual activity: Yes       Review of Systems:    Constitution:   Denies chills, fever, and sweats.  HENT:   Denies headaches or blurry vision.  Cardiovascular:  Denies chest pain or irregular heart beat.  Respiratory:   Denies cough or shortness of breath.  Gastrointestinal:  Denies abdominal pain, nausea, or vomiting.  Musculoskeletal:   Denies muscle cramps.  Neurological:   Denies dizziness or focal weakness.  Psychiatric/Behavior: Normal mental status.  Hematology/Lymph:  Denies bleeding problem or easy bruising/bleeding.  Skin:    Denies rash or suspicious lesions.    Examination:    Vital Signs:    Vitals:    06/22/22 1310   BP: (!) 134/96   Pulse: 99   Weight: 104.3 kg (230 lb)   Height: 5' 5" (1.651 m)       Body mass index is 38.27 kg/m².    Constitution:   Well-developed, well nourished patient in no acute distress.  Neurological:   Alert and oriented x 3 and cooperative to examination.     Psychiatric/Behavior: Normal mental status.  Respiratory:   No shortness of breath.  Eyes:    Extraoccular muscles intact  Skin:    No scars, rash or suspicious lesions.    MSK:   Focused exam of her bilateral ankle shows the right ankle in a splint.  She is able wiggle her toes.  Expected swelling.  Distally she is neurovascularly intact.  Left ankle shows swelling and ecchymosis laterally.  She is tender over lateral ligaments.  Full range of motion the ankle.  Distally she is neurovascular intact    Imaging:  CT scan was reviewed which shows trimalleolar ankle fracture       Assessment: Closed trimalleolar " fracture of left ankle, initial encounter        Plan: I have recommended surgical intervention:  Open reduction internal fixation of the right trimalleolar ankle fracture.  We discussed the details of the procedure and expected postoperative course.  We discussed the benefits of surgery which be decrease her pain and increase function.  We discussed the risks of surgery which is small but could be significant she has continued pain, stiffness, or infection.  After discussion she would like to proceed.  Plan for surgery Thursday June 23

## 2022-06-23 ENCOUNTER — ANESTHESIA (OUTPATIENT)
Dept: SURGERY | Facility: HOSPITAL | Age: 52
End: 2022-06-23
Payer: COMMERCIAL

## 2022-06-23 ENCOUNTER — HOSPITAL ENCOUNTER (OUTPATIENT)
Facility: HOSPITAL | Age: 52
Discharge: HOME | End: 2022-06-23
Attending: ORTHOPAEDIC SURGERY | Admitting: ORTHOPAEDIC SURGERY
Payer: COMMERCIAL

## 2022-06-23 DIAGNOSIS — S93.492A SPRAIN OF ANTERIOR TALOFIBULAR LIGAMENT OF LEFT ANKLE, INITIAL ENCOUNTER: ICD-10-CM

## 2022-06-23 DIAGNOSIS — S82.851A CLOSED TRIMALLEOLAR FRACTURE OF RIGHT ANKLE, INITIAL ENCOUNTER: Primary | ICD-10-CM

## 2022-06-23 LAB
B-HCG UR QL: NEGATIVE
CTP QC/QA: YES

## 2022-06-23 PROCEDURE — 27823 PR OPEN TX TRIMALLEOLAR ANKLE FX W FIX PST LIP: ICD-10-PCS | Mod: RT,,, | Performed by: ORTHOPAEDIC SURGERY

## 2022-06-23 PROCEDURE — 25000003 PHARM REV CODE 250: Performed by: NURSE ANESTHETIST, CERTIFIED REGISTERED

## 2022-06-23 PROCEDURE — 63600175 PHARM REV CODE 636 W HCPCS: Performed by: ORTHOPAEDIC SURGERY

## 2022-06-23 PROCEDURE — 99499 NO LOS: ICD-10-PCS | Mod: ,,, | Performed by: NURSE PRACTITIONER

## 2022-06-23 PROCEDURE — 71000016 HC POSTOP RECOV ADDL HR: Performed by: ORTHOPAEDIC SURGERY

## 2022-06-23 PROCEDURE — 37000009 HC ANESTHESIA EA ADD 15 MINS: Performed by: ORTHOPAEDIC SURGERY

## 2022-06-23 PROCEDURE — 27823 TREATMENT OF ANKLE FRACTURE: CPT | Mod: RT,,, | Performed by: ORTHOPAEDIC SURGERY

## 2022-06-23 PROCEDURE — 76942 ECHO GUIDE FOR BIOPSY: CPT | Performed by: ANESTHESIOLOGY

## 2022-06-23 PROCEDURE — C1769 GUIDE WIRE: HCPCS | Performed by: ORTHOPAEDIC SURGERY

## 2022-06-23 PROCEDURE — 27823 TREATMENT OF ANKLE FRACTURE: CPT | Mod: 80,RT,, | Performed by: REHABILITATION UNIT

## 2022-06-23 PROCEDURE — 36000708 HC OR TIME LEV III 1ST 15 MIN: Performed by: ORTHOPAEDIC SURGERY

## 2022-06-23 PROCEDURE — 25000003 PHARM REV CODE 250

## 2022-06-23 PROCEDURE — 63600175 PHARM REV CODE 636 W HCPCS: Performed by: ANESTHESIOLOGY

## 2022-06-23 PROCEDURE — 36000709 HC OR TIME LEV III EA ADD 15 MIN: Performed by: ORTHOPAEDIC SURGERY

## 2022-06-23 PROCEDURE — 25000003 PHARM REV CODE 250: Performed by: ORTHOPAEDIC SURGERY

## 2022-06-23 PROCEDURE — 71000033 HC RECOVERY, INTIAL HOUR: Performed by: ORTHOPAEDIC SURGERY

## 2022-06-23 PROCEDURE — C1713 ANCHOR/SCREW BN/BN,TIS/BN: HCPCS | Performed by: ORTHOPAEDIC SURGERY

## 2022-06-23 PROCEDURE — 64447 NJX AA&/STRD FEMORAL NRV IMG: CPT | Performed by: ANESTHESIOLOGY

## 2022-06-23 PROCEDURE — 37000008 HC ANESTHESIA 1ST 15 MINUTES: Performed by: ORTHOPAEDIC SURGERY

## 2022-06-23 PROCEDURE — 99499 UNLISTED E&M SERVICE: CPT | Mod: ,,, | Performed by: NURSE PRACTITIONER

## 2022-06-23 PROCEDURE — 63600175 PHARM REV CODE 636 W HCPCS: Performed by: NURSE ANESTHETIST, CERTIFIED REGISTERED

## 2022-06-23 PROCEDURE — 71000015 HC POSTOP RECOV 1ST HR: Performed by: ORTHOPAEDIC SURGERY

## 2022-06-23 PROCEDURE — 27823 PR OPEN TX TRIMALLEOLAR ANKLE FX W FIX PST LIP: ICD-10-PCS | Mod: 80,RT,, | Performed by: REHABILITATION UNIT

## 2022-06-23 PROCEDURE — 63600175 PHARM REV CODE 636 W HCPCS

## 2022-06-23 PROCEDURE — 81025 URINE PREGNANCY TEST: CPT | Performed by: ORTHOPAEDIC SURGERY

## 2022-06-23 PROCEDURE — 27201423 OPTIME MED/SURG SUP & DEVICES STERILE SUPPLY: Performed by: ORTHOPAEDIC SURGERY

## 2022-06-23 DEVICE — IMPLANTABLE DEVICE: Type: IMPLANTABLE DEVICE | Site: ANKLE | Status: FUNCTIONAL

## 2022-06-23 DEVICE — SCREW LOCKING BONE 2.7X22MM: Type: IMPLANTABLE DEVICE | Site: ANKLE | Status: FUNCTIONAL

## 2022-06-23 DEVICE — SCREW STRDRV REC T8 2.7X28MM: Type: IMPLANTABLE DEVICE | Site: ANKLE | Status: FUNCTIONAL

## 2022-06-23 DEVICE — SCREW LOCKING BONE 2.7X20MM: Type: IMPLANTABLE DEVICE | Site: ANKLE | Status: FUNCTIONAL

## 2022-06-23 DEVICE — SCREW CORTEX ST 2.7X38MM: Type: IMPLANTABLE DEVICE | Site: ANKLE | Status: FUNCTIONAL

## 2022-06-23 DEVICE — SCREW LOCKING BONE 2.7X24MM: Type: IMPLANTABLE DEVICE | Site: ANKLE | Status: FUNCTIONAL

## 2022-06-23 DEVICE — SCREW VA LOK ST T8 2.7X42MM: Type: IMPLANTABLE DEVICE | Site: ANKLE | Status: FUNCTIONAL

## 2022-06-23 RX ORDER — MIDAZOLAM HYDROCHLORIDE 1 MG/ML
INJECTION INTRAMUSCULAR; INTRAVENOUS
Status: COMPLETED
Start: 2022-06-23 | End: 2022-06-23

## 2022-06-23 RX ORDER — OXYCODONE AND ACETAMINOPHEN 5; 325 MG/1; MG/1
1 TABLET ORAL EVERY 6 HOURS PRN
Qty: 30 TABLET | Refills: 0 | Status: SHIPPED | OUTPATIENT
Start: 2022-06-23 | End: 2022-06-23 | Stop reason: SDUPTHER

## 2022-06-23 RX ORDER — HYDROMORPHONE HYDROCHLORIDE 2 MG/ML
0.4 INJECTION, SOLUTION INTRAMUSCULAR; INTRAVENOUS; SUBCUTANEOUS EVERY 5 MIN PRN
Status: DISCONTINUED | OUTPATIENT
Start: 2022-06-23 | End: 2022-06-23 | Stop reason: HOSPADM

## 2022-06-23 RX ORDER — ACETAMINOPHEN 10 MG/ML
INJECTION, SOLUTION INTRAVENOUS
Status: COMPLETED
Start: 2022-06-23 | End: 2022-06-23

## 2022-06-23 RX ORDER — HYDROMORPHONE HYDROCHLORIDE 2 MG/ML
0.2 INJECTION, SOLUTION INTRAMUSCULAR; INTRAVENOUS; SUBCUTANEOUS EVERY 5 MIN PRN
Status: DISCONTINUED | OUTPATIENT
Start: 2022-06-23 | End: 2022-06-23

## 2022-06-23 RX ORDER — CEFAZOLIN SODIUM 2 G/50ML
2 SOLUTION INTRAVENOUS
Status: COMPLETED | OUTPATIENT
Start: 2022-06-23 | End: 2022-06-23

## 2022-06-23 RX ORDER — ROPIVACAINE HYDROCHLORIDE 5 MG/ML
INJECTION, SOLUTION EPIDURAL; INFILTRATION; PERINEURAL
Status: COMPLETED | OUTPATIENT
Start: 2022-06-23 | End: 2022-06-23

## 2022-06-23 RX ORDER — SODIUM CHLORIDE, SODIUM GLUCONATE, SODIUM ACETATE, POTASSIUM CHLORIDE AND MAGNESIUM CHLORIDE 30; 37; 368; 526; 502 MG/100ML; MG/100ML; MG/100ML; MG/100ML; MG/100ML
1000 INJECTION, SOLUTION INTRAVENOUS CONTINUOUS
Status: DISCONTINUED | OUTPATIENT
Start: 2022-06-23 | End: 2022-06-23 | Stop reason: HOSPADM

## 2022-06-23 RX ORDER — PROMETHAZINE HYDROCHLORIDE 25 MG/1
25 TABLET ORAL EVERY 6 HOURS PRN
Status: DISCONTINUED | OUTPATIENT
Start: 2022-06-23 | End: 2022-06-23 | Stop reason: HOSPADM

## 2022-06-23 RX ORDER — ONDANSETRON 2 MG/ML
4 INJECTION INTRAMUSCULAR; INTRAVENOUS DAILY PRN
Status: DISCONTINUED | OUTPATIENT
Start: 2022-06-23 | End: 2022-06-23 | Stop reason: HOSPADM

## 2022-06-23 RX ORDER — MUPIROCIN 20 MG/G
OINTMENT TOPICAL 2 TIMES DAILY
Status: DISCONTINUED | OUTPATIENT
Start: 2022-06-23 | End: 2022-06-23 | Stop reason: HOSPADM

## 2022-06-23 RX ORDER — OXYCODONE AND ACETAMINOPHEN 5; 325 MG/1; MG/1
1 TABLET ORAL EVERY 6 HOURS PRN
Qty: 30 TABLET | Refills: 0 | Status: SHIPPED | OUTPATIENT
Start: 2022-06-23 | End: 2023-02-03

## 2022-06-23 RX ORDER — TRAMADOL HYDROCHLORIDE 50 MG/1
50 TABLET ORAL EVERY 4 HOURS PRN
Status: DISCONTINUED | OUTPATIENT
Start: 2022-06-23 | End: 2022-06-23 | Stop reason: HOSPADM

## 2022-06-23 RX ORDER — DIPHENHYDRAMINE HYDROCHLORIDE 50 MG/ML
25 INJECTION INTRAMUSCULAR; INTRAVENOUS ONCE
Status: DISCONTINUED | OUTPATIENT
Start: 2022-06-23 | End: 2022-06-23 | Stop reason: HOSPADM

## 2022-06-23 RX ORDER — PROPOFOL 10 MG/ML
VIAL (ML) INTRAVENOUS
Status: DISCONTINUED | OUTPATIENT
Start: 2022-06-23 | End: 2022-06-23

## 2022-06-23 RX ORDER — KETOROLAC TROMETHAMINE 10 MG/1
10 TABLET, FILM COATED ORAL EVERY 6 HOURS
Qty: 20 TABLET | Refills: 0 | Status: SHIPPED | OUTPATIENT
Start: 2022-06-23 | End: 2022-06-23 | Stop reason: SDUPTHER

## 2022-06-23 RX ORDER — DEXAMETHASONE SODIUM PHOSPHATE 4 MG/ML
INJECTION, SOLUTION INTRA-ARTICULAR; INTRALESIONAL; INTRAMUSCULAR; INTRAVENOUS; SOFT TISSUE
Status: DISCONTINUED | OUTPATIENT
Start: 2022-06-23 | End: 2022-06-23

## 2022-06-23 RX ORDER — GABAPENTIN 300 MG/1
300 CAPSULE ORAL 3 TIMES DAILY
Status: DISCONTINUED | OUTPATIENT
Start: 2022-06-23 | End: 2022-06-23 | Stop reason: HOSPADM

## 2022-06-23 RX ORDER — KETOROLAC TROMETHAMINE 10 MG/1
10 TABLET, FILM COATED ORAL EVERY 6 HOURS
Qty: 20 TABLET | Refills: 0 | Status: SHIPPED | OUTPATIENT
Start: 2022-06-23 | End: 2022-06-28

## 2022-06-23 RX ORDER — KETOROLAC TROMETHAMINE 30 MG/ML
INJECTION, SOLUTION INTRAMUSCULAR; INTRAVENOUS
Status: DISCONTINUED | OUTPATIENT
Start: 2022-06-23 | End: 2022-06-23

## 2022-06-23 RX ORDER — HYDROMORPHONE HYDROCHLORIDE 2 MG/ML
INJECTION, SOLUTION INTRAMUSCULAR; INTRAVENOUS; SUBCUTANEOUS
Status: DISCONTINUED | OUTPATIENT
Start: 2022-06-23 | End: 2022-06-23

## 2022-06-23 RX ORDER — FENTANYL CITRATE 50 UG/ML
INJECTION, SOLUTION INTRAMUSCULAR; INTRAVENOUS
Status: DISCONTINUED | OUTPATIENT
Start: 2022-06-23 | End: 2022-06-23

## 2022-06-23 RX ORDER — PHENYLEPHRINE HCL IN 0.9% NACL 1 MG/10 ML
SYRINGE (ML) INTRAVENOUS
Status: DISCONTINUED | OUTPATIENT
Start: 2022-06-23 | End: 2022-06-23

## 2022-06-23 RX ORDER — ACETAMINOPHEN 10 MG/ML
INJECTION, SOLUTION INTRAVENOUS
Status: DISCONTINUED | OUTPATIENT
Start: 2022-06-23 | End: 2022-06-23

## 2022-06-23 RX ORDER — MEPERIDINE HYDROCHLORIDE 25 MG/ML
12.5 INJECTION INTRAMUSCULAR; INTRAVENOUS; SUBCUTANEOUS ONCE
Status: DISCONTINUED | OUTPATIENT
Start: 2022-06-23 | End: 2022-06-23 | Stop reason: HOSPADM

## 2022-06-23 RX ORDER — HYDROCODONE BITARTRATE AND ACETAMINOPHEN 5; 325 MG/1; MG/1
1 TABLET ORAL EVERY 4 HOURS PRN
Status: DISCONTINUED | OUTPATIENT
Start: 2022-06-23 | End: 2022-06-23 | Stop reason: HOSPADM

## 2022-06-23 RX ORDER — ONDANSETRON 4 MG/1
4 TABLET, ORALLY DISINTEGRATING ORAL ONCE
Status: DISCONTINUED | OUTPATIENT
Start: 2022-06-23 | End: 2022-06-23 | Stop reason: HOSPADM

## 2022-06-23 RX ORDER — ACETAMINOPHEN 325 MG/1
650 TABLET ORAL EVERY 4 HOURS PRN
Status: DISCONTINUED | OUTPATIENT
Start: 2022-06-23 | End: 2022-06-23 | Stop reason: HOSPADM

## 2022-06-23 RX ORDER — ROCURONIUM BROMIDE 10 MG/ML
INJECTION, SOLUTION INTRAVENOUS
Status: DISCONTINUED | OUTPATIENT
Start: 2022-06-23 | End: 2022-06-23

## 2022-06-23 RX ORDER — SODIUM CHLORIDE 0.9 % (FLUSH) 0.9 %
3 SYRINGE (ML) INJECTION EVERY 6 HOURS PRN
Status: DISCONTINUED | OUTPATIENT
Start: 2022-06-23 | End: 2022-06-23 | Stop reason: HOSPADM

## 2022-06-23 RX ORDER — ROPIVACAINE HYDROCHLORIDE 5 MG/ML
INJECTION, SOLUTION EPIDURAL; INFILTRATION; PERINEURAL
Status: COMPLETED
Start: 2022-06-23 | End: 2022-06-23

## 2022-06-23 RX ORDER — ONDANSETRON 4 MG/1
8 TABLET, ORALLY DISINTEGRATING ORAL EVERY 8 HOURS PRN
Status: DISCONTINUED | OUTPATIENT
Start: 2022-06-23 | End: 2022-06-23 | Stop reason: HOSPADM

## 2022-06-23 RX ORDER — HYDROCODONE BITARTRATE AND ACETAMINOPHEN 10; 325 MG/1; MG/1
1 TABLET ORAL EVERY 4 HOURS PRN
Status: DISCONTINUED | OUTPATIENT
Start: 2022-06-23 | End: 2022-06-23 | Stop reason: HOSPADM

## 2022-06-23 RX ORDER — METHOCARBAMOL 750 MG/1
750 TABLET, FILM COATED ORAL 3 TIMES DAILY
Qty: 30 TABLET | Refills: 0 | Status: SHIPPED | OUTPATIENT
Start: 2022-06-23 | End: 2022-06-23 | Stop reason: SDUPTHER

## 2022-06-23 RX ORDER — METHOCARBAMOL 750 MG/1
750 TABLET, FILM COATED ORAL 3 TIMES DAILY
Qty: 30 TABLET | Refills: 0 | Status: SHIPPED | OUTPATIENT
Start: 2022-06-23 | End: 2022-07-03

## 2022-06-23 RX ORDER — LIDOCAINE HYDROCHLORIDE 20 MG/ML
INJECTION, SOLUTION EPIDURAL; INFILTRATION; INTRACAUDAL; PERINEURAL
Status: DISCONTINUED | OUTPATIENT
Start: 2022-06-23 | End: 2022-06-23

## 2022-06-23 RX ORDER — LIDOCAINE HYDROCHLORIDE 10 MG/ML
1 INJECTION, SOLUTION EPIDURAL; INFILTRATION; INTRACAUDAL; PERINEURAL ONCE
Status: DISCONTINUED | OUTPATIENT
Start: 2022-06-23 | End: 2022-06-23 | Stop reason: HOSPADM

## 2022-06-23 RX ORDER — MIDAZOLAM HYDROCHLORIDE 1 MG/ML
2 INJECTION INTRAMUSCULAR; INTRAVENOUS ONCE AS NEEDED
Status: COMPLETED | OUTPATIENT
Start: 2022-06-23 | End: 2022-06-23

## 2022-06-23 RX ADMIN — Medication 200 MCG: at 04:06

## 2022-06-23 RX ADMIN — ONDANSETRON HYDROCHLORIDE 4 MG: 2 SOLUTION INTRAMUSCULAR; INTRAVENOUS at 02:06

## 2022-06-23 RX ADMIN — Medication 100 MCG: at 05:06

## 2022-06-23 RX ADMIN — ACETAMINOPHEN 1000 MG: 10 INJECTION, SOLUTION INTRAVENOUS at 05:06

## 2022-06-23 RX ADMIN — HYDROMORPHONE HYDROCHLORIDE 1 MG: 2 INJECTION, SOLUTION INTRAMUSCULAR; INTRAVENOUS; SUBCUTANEOUS at 05:06

## 2022-06-23 RX ADMIN — MIDAZOLAM 2 MG: 1 INJECTION INTRAMUSCULAR; INTRAVENOUS at 01:06

## 2022-06-23 RX ADMIN — ROPIVACAINE HYDROCHLORIDE 30 ML: 5 INJECTION, SOLUTION EPIDURAL; INFILTRATION; PERINEURAL at 02:06

## 2022-06-23 RX ADMIN — LIDOCAINE HYDROCHLORIDE 80 MG: 20 INJECTION, SOLUTION EPIDURAL; INFILTRATION; INTRACAUDAL; PERINEURAL at 06:06

## 2022-06-23 RX ADMIN — SUGAMMADEX 200 MG: 100 INJECTION, SOLUTION INTRAVENOUS at 05:06

## 2022-06-23 RX ADMIN — PROPOFOL 150 MG: 10 INJECTION, EMULSION INTRAVENOUS at 02:06

## 2022-06-23 RX ADMIN — PROPOFOL 50 MG: 10 INJECTION, EMULSION INTRAVENOUS at 02:06

## 2022-06-23 RX ADMIN — LIDOCAINE HYDROCHLORIDE 50 MG: 20 INJECTION, SOLUTION EPIDURAL; INFILTRATION; INTRACAUDAL; PERINEURAL at 02:06

## 2022-06-23 RX ADMIN — DEXAMETHASONE SODIUM PHOSPHATE 8 MG: 4 INJECTION, SOLUTION INTRA-ARTICULAR; INTRALESIONAL; INTRAMUSCULAR; INTRAVENOUS; SOFT TISSUE at 02:06

## 2022-06-23 RX ADMIN — Medication 100 MCG: at 04:06

## 2022-06-23 RX ADMIN — Medication 100 MCG: at 02:06

## 2022-06-23 RX ADMIN — KETOROLAC TROMETHAMINE 30 MG: 30 INJECTION, SOLUTION INTRAMUSCULAR at 05:06

## 2022-06-23 RX ADMIN — SODIUM CHLORIDE, POTASSIUM CHLORIDE, SODIUM LACTATE AND CALCIUM CHLORIDE: 600; 310; 30; 20 INJECTION, SOLUTION INTRAVENOUS at 05:06

## 2022-06-23 RX ADMIN — Medication 100 MCG: at 03:06

## 2022-06-23 RX ADMIN — HYDROCODONE BITARTRATE AND ACETAMINOPHEN 1 TABLET: 5; 325 TABLET ORAL at 07:06

## 2022-06-23 RX ADMIN — PROPOFOL 20 MG: 10 INJECTION, EMULSION INTRAVENOUS at 06:06

## 2022-06-23 RX ADMIN — SODIUM CHLORIDE, POTASSIUM CHLORIDE, SODIUM LACTATE AND CALCIUM CHLORIDE: 600; 310; 30; 20 INJECTION, SOLUTION INTRAVENOUS at 03:06

## 2022-06-23 RX ADMIN — SODIUM CHLORIDE, POTASSIUM CHLORIDE, SODIUM LACTATE AND CALCIUM CHLORIDE: 600; 310; 30; 20 INJECTION, SOLUTION INTRAVENOUS at 02:06

## 2022-06-23 RX ADMIN — ROCURONIUM BROMIDE 20 MG: 10 SOLUTION INTRAVENOUS at 03:06

## 2022-06-23 RX ADMIN — MIDAZOLAM HYDROCHLORIDE 2 MG: 1 INJECTION INTRAMUSCULAR; INTRAVENOUS at 01:06

## 2022-06-23 RX ADMIN — ROCURONIUM BROMIDE 30 MG: 10 SOLUTION INTRAVENOUS at 04:06

## 2022-06-23 RX ADMIN — FENTANYL CITRATE 100 MCG: 50 INJECTION, SOLUTION INTRAMUSCULAR; INTRAVENOUS at 02:06

## 2022-06-23 RX ADMIN — ROCURONIUM BROMIDE 50 MG: 10 SOLUTION INTRAVENOUS at 02:06

## 2022-06-23 RX ADMIN — CEFAZOLIN SODIUM 2 G: 2 SOLUTION INTRAVENOUS at 02:06

## 2022-06-23 RX ADMIN — HYDROMORPHONE HYDROCHLORIDE 2 MG: 2 INJECTION, SOLUTION INTRAMUSCULAR; INTRAVENOUS; SUBCUTANEOUS at 04:06

## 2022-06-23 NOTE — ANESTHESIA PREPROCEDURE EVALUATION
06/23/2022  Jadyn Wilder is a 52 y.o., female.  ORIF, ANKLE-will start prone-Synthes (Right Ankle)    Pre-op Assessment    I have reviewed the Patient Summary Reports.     I have reviewed the Nursing Notes. I have reviewed the NPO Status.   I have reviewed the Medications.     Review of Systems  Anesthesia Hx:  No problems with previous Anesthesia    Social:  Former Smoker    Hematology/Oncology:  Hematology Normal   Oncology Normal     EENT/Dental:EENT/Dental Normal   Cardiovascular:   Exercise tolerance: good Hypertension Denies Dysrhythmias.   Functional Capacity good / => 4 METS    Pulmonary:  Pulmonary Normal    Renal/:   Denies Chronic Renal Disease.     Hepatic/GI:  Hepatic/GI Normal    Musculoskeletal:  Musculoskeletal Normal    Endocrine:  Endocrine Normal  Denies Morbid Obesity / BMI > 40  Dermatological:  Skin Normal    Psych:   Psychiatric History anxiety          Physical Exam  General: Alert, Oriented, Well nourished and Cooperative    Airway:  Mallampati: II   Mouth Opening: Normal  TM Distance: Normal  Tongue: Normal  Neck ROM: Normal ROM    Dental:  Intact    Chest/Lungs:  Clear to auscultation, Normal Respiratory Rate    Heart:  Rate: Normal  Rhythm: Regular Rhythm        Anesthesia Plan  Type of Anesthesia, risks & benefits discussed:    Anesthesia Type: Gen ETT, Regional  Intra-op Monitoring Plan: Standard ASA Monitors  Post Op Pain Control Plan: multimodal analgesia  Induction:  IV and Inhalation  Airway Plan: Direct  Informed Consent: Informed consent signed with the Patient and all parties understand the risks and agree with anesthesia plan.  All questions answered. Patient consented to blood products? Yes  ASA Score: 2  Day of Surgery Review of History & Physical: H&P Update referred to the surgeon/provider.  Anesthesia Plan Notes: RT FEMORAL NERVE BLK    Ready For Surgery From  Anesthesia Perspective.     .

## 2022-06-23 NOTE — OR NURSING
13:46  TIMEOUT DONE     14:05  DR. CH WILL ATTEMPT TO DO A RIGHT FEMORAL NERVE BLOCK,     14:07  BLOCK COMPLETED AND TOLERATED WELL.

## 2022-06-23 NOTE — TRANSFER OF CARE
Anesthesia Transfer of Care Note    Patient: Jadyn Wilder    Procedure(s) Performed: Procedure(s) (LRB):  ORIF, ANKLE-will start prone-Synthes (Right)    Patient location: PACU    Anesthesia Type: general    Transport from OR: Transported from OR on room air with adequate spontaneous ventilation    Post pain: adequate analgesia    Post assessment: no apparent anesthetic complications    Post vital signs: stable    Level of consciousness: awake and alert    Nausea/Vomiting: no nausea/vomiting    Complications: none    Transfer of care protocol was followed

## 2022-06-23 NOTE — ANESTHESIA PROCEDURE NOTES
Peripheral Block    Patient location during procedure: pre-op   Block not for primary anesthetic.  Reason for block: at surgeon's request and post-op pain management   Post-op Pain Location: HOLDING   Start time: 6/23/2022 2:05 PM  Timeout: 6/23/2022 2:02 PM   End time: 6/23/2022 2:07 PM    Staffing  Authorizing Provider: Ventura Martinez MD  Performing Provider: Ventura Martinez MD    Preanesthetic Checklist  Completed: patient identified, IV checked, site marked, risks and benefits discussed, surgical consent, monitors and equipment checked, pre-op evaluation and timeout performed  Peripheral Block  Patient position: supine  Prep: ChloraPrep  Patient monitoring: heart rate, cardiac monitor, continuous pulse ox, continuous capnometry and frequent blood pressure checks  Block type: femoral  Laterality: right  Injection technique: single shot  Needle  Needle type: Stimuplex   Needle gauge: 22 G  Needle length: 5 in  Needle localization: anatomical landmarks, ultrasound guidance and nerve stimulator  Needle insertion depth: 4 cm   -ultrasound image captured on disc.  Assessment  Injection assessment: negative aspiration, negative parasthesia and local visualized surrounding nerve  Paresthesia pain: none  Heart rate change: no  Slow fractionated injection: yes    Medications:    Medications: ropivacaine (NAROPIN) injection 0.5% - Perineural   30 mL - 6/23/2022 2:06:00 PM    Additional Notes  VSS.  [facility] RN monitoring vitals throughout procedure.  Patient tolerated procedure well.

## 2022-06-23 NOTE — DISCHARGE SUMMARY
[facility] - Periop Services  Discharge Note  Short Stay    Procedure(s) (LRB):  ORIF, ANKLE-will start prone-Synthes (Right)    OUTCOME: Patient tolerated treatment/procedure well without complication and is now ready for discharge.    DISPOSITION: Home or Self Care    FINAL DIAGNOSIS:  <principal problem not specified>    FOLLOWUP: In clinic    DISCHARGE INSTRUCTIONS:  No discharge procedures on file.     TIME SPENT ON DISCHARGE: 10 minutes

## 2022-06-23 NOTE — OP NOTE
DATE OF SERVICE: 06/23/2022    SURGEON: Ezekiel Emery MD    ASSISTANT:Trent Gee MD and Patricia Mina NP. Mrs. Mina and Dr. Gee was essential in manipulating the ankle while performed fixation, retraction, and closure    PREOPERATIVE DIAGNOSIS:   Right trimalleolar ankle fracture    POSTOPERATIVE DIAGNOSIS:   Right trimalleolar ankle fracture    PROCEDURE PERFORMED:  1. Open reduction internal fixation of right trimalleolar ankle    ESTIMATED BLOOD LOSS: Minimal    COMPLICATIONS: None.    TOURNIQUET TIME: 90 + 30 minutes.    ANTIBIOTICS: Ancef    IMPLANTS: Synthes       INDICATIONS FOR PROCEDURE: Jadyn Wilder is a 52 y.o. year old who sustained a displaced right ankle fracture.  This was confirmed with radiographs.  I recommended surgical intervention to stabilize the fracture.  We discussed the risks, benefits, and alternatives to operative intervention.  After discussion, patient has elected to proceed    OPERATIVE REPORT: Jadyn Wilder was initially seen in the preoperative holding area where history and physical were reviewed without change. The operative leg was marked, consents were reviewed, and any questions were answered for the patient and family. The patient was then taken back to the operating room, placed supine on the operating table with all bony prominences padded. Then a nonsterile tourniquet was placed around the upper thigh. The patient was induced under anesthesia.  She was then flipped to the prone position.  Again all bony prominences were padded.  The operative lower extremity was prepped and draped in standard sterile fashion. A timeout led by the surgeon was performed, and preoperative antibiotics were given. The limp was exsanguinated with gravity. The tourniquet was raised to 100 mmHg over the systolic blood pressure.    I started with posterolateral approach to the ankle.  I sharply incised the skin is breathy subcutaneous tissue.  I was mindful of the sural nerve.  I then was  able to dissect through the peroneal tendons and on to the distal aspect of the tibia.  I cleaned the joint surface and removed any I interfragmentary pieces.  I was then able to reduce the posterior malleolus and provisionally fix this with a K-wire.  I then used a Synthes posterior malleolar plate in a buttress type fashion which nicely secured the fracture.  Two screws were placed in the shaft into screws in the fracture piece.  I then moved the peroneal tendons medially was able to expose the distal fibula.  This was a comminuted fracture.  I was able to establish left than rotation through a cortical key and then used a posterior lateral Synthes plate to secure this.  Four screws were placed into the shaft in 5 screws in the distal fragment.  I then irrigated the wound and closed the incision fascia in layers.  A sterile dressing was placed.  She was then flipped to the supine position for the medial malleolus    I made an incision directly over the medial malleolus. I sharply incised through skin and subcutaneous tissue.  I was able to flip out the medial malleolus and remote remove any fragments from within the joint.  This was irrigated thoroughly.  I was then perform an anatomic reduction.  She had 1 large fragment anteriorly which I secured with a partially threaded 4 0 mm screw.  The posterior aspect of the medial malleolus had a comminuted piece with both the transverse and horizontal component.  I used a condylar buttress plate to secure this portion of the fracture.  I again confirmed reduction of the fracture and placement of the hardware under fluoroscopy.      Subcutaneous layer was also closed with monocryl sutures.  The skin was closed with a running Nylon.  Dermabond was used.  A sterile dressing was placed, and the tourniquet was deflated after 90 +30 minutes.  The patient was placed into a bivalve cast.  The patient was awakened from anesthesia and taken to the postoperative care unit in stable  condition.

## 2022-06-23 NOTE — ANESTHESIA PROCEDURE NOTES
Intubation    Date/Time: 6/23/2022 2:31 PM  Performed by: Morales Naranjo CRNA  Authorized by: Ventura Martinez MD     Intubation:     Induction:  Intravenous    Intubated:  Postinduction    Attempts:  2    Attempted By:  CRNA    Method of Intubation:  Direct    Blade:  Wang 2    Laryngeal View Grade: Grade IV - neither epiglottis nor glottis seen      Attempted By (2nd Attempt):  CRNA    Method of Intubation (2nd Attempt):  Video laryngoscopy    Blade (2nd Attempt):  Hensley 3    Laryngeal View Grade (2nd Attempt): Grade IIa - cords partially seen      Difficult Airway Encountered?: No      Complications:  None    Airway Device:  Oral endotracheal tube    Airway Device Size:  7.0    Style/Cuff Inflation:  Cuffed    Tube secured:  22    Secured at:  The lips    Placement Verified By:  Capnometry    Complicating Factors:  Anterior larynx    Findings Post-Intubation:  BS equal bilateral

## 2022-06-23 NOTE — PATIENT INSTRUCTIONS
.[facility]  Ezekiel Emery Jr., MD  [address], [address]   [place] [address]   phone: [phone]  fax: [phone]    ANKLE FRACTURE    DIET:  Following general anesthesia, start with clear liquids to decrease chances of nausea.  Begin with water, coffee, tea, ginger ale, sprite, or apple juice.  If tolerated, advance to Jell-o, soup, crackers, or toast.  Once these are tolerated, advance to a regular diet.    DRESSING:  Keep the splint clean and dry. If the splint feels too tight, remove the ace bandage and gently pull open the cut cast to relieve tightness.  Re-wrap the ace bandage when the pain improves.    SHOWERING:  You may shower after surgery.  Keep the splint clean and dry during showers.     ACTIVITY:            -  Elevate the knee with 2-3 pillows under the ANKLE.  The leg should be elevated              above the heart.            -  Use crutches or a walker to keep the weight off of the operative leg.                 -  You CANNOT bear weight on your leg.      PAIN MEDICATION:       -  Use the Percocet as prescribed for pain after surgery.  Pain medicine can cause nausea           and vomiting, especially on an empty stomach.       -  In addition, you can take Ketorolac as prescribed or Iburpofen 200 mg, 4 pills every 8           hours.  Ketorolac or Iburpofen medicine can irritate your stomach or cause heartburn.  If           this happens to you, stop taking the medicine.       -  Ice and elevation are more useful than pain medicine for surgical pain.  If you are having           too much pain or discomfort, try more ice and higher elevation.       -  Do NOT drink alcohol while taking pain medication.    BLOOD CLOT PREVENTION:  If you are aware that you are at high risk for blood blots, notify your physician.  In general, you should walk s much as possible after surgery to increase blood circulation throughout your body. Most patients will take  Aspirin 81 mg, 1 pill twice a day for 2 weeks to help further prevent blood clots.    DRIVING OR FLYING:  You must NEVER drive while taking narcotic pain medication  You may ride in a car, take a train, or fly once you feel comfortable    PHYSICAL THERAPY:  You will not start physical therapy until after your first follow up appointment.    WORK OR SCHOOL:  You may return to an office job or school whenver comfortable.  Most patients return ~ 1 week after surgery.  For more active jobs that require extended walking, squatting, or lifting, you can wait until after your follow up appointment.  Any other types of jobs should be discussed with Dr. Emery to determine a date for return to work.    PROBLEMS TO REPORT:       1.  Fever greater than 102 F       2.  Incision that is very red and/or draining pus       3.  Unable to urinate within 8 hours of surgery (a rare effect of being put to sleep for surgery)  Calls should be directed to the clinic:  [phone]    RETURN APPOINTMENT:  You will receive your follow up appointment before you leave the surgery center.  To confirm or reschedule your appointment, call [phone]

## 2022-06-24 VITALS
DIASTOLIC BLOOD PRESSURE: 64 MMHG | HEART RATE: 84 BPM | HEIGHT: 65 IN | SYSTOLIC BLOOD PRESSURE: 111 MMHG | RESPIRATION RATE: 18 BRPM | TEMPERATURE: 96 F | BODY MASS INDEX: 39.65 KG/M2 | OXYGEN SATURATION: 96 % | WEIGHT: 238 LBS

## 2022-06-24 NOTE — ANESTHESIA POSTPROCEDURE EVALUATION
Anesthesia Post Evaluation    Patient: Jadyn Wilder    Procedure(s) Performed: Procedure(s) (LRB):  ORIF, ANKLE-will start prone-Synthes (Right)    Final Anesthesia Type: general      Patient location during evaluation: PACU  Patient participation: Yes- Able to Participate  Level of consciousness: awake and alert and oriented  Post-procedure vital signs: reviewed and stable  Pain management: adequate  Airway patency: patent  ROSMERY mitigation strategies: Verification of full reversal of neuromuscular block  PONV status at discharge: No PONV  Anesthetic complications: no      Cardiovascular status: blood pressure returned to baseline and stable  Respiratory status: spontaneous ventilation and unassisted  Hydration status: euvolemic  Follow-up not needed.  Comments: [facility]          Vitals Value Taken Time   /64 06/23/22 1854   Temp 35.5 °C (95.9 °F) 06/23/22 1821   Pulse 84 06/23/22 1854   Resp 18 06/23/22 1939   SpO2 96 % 06/23/22 1854         Event Time   Out of Recovery 18:49:00         Pain/Emelyn Score: Pain Rating Prior to Med Admin: 5 (6/23/2022  7:39 PM)  Emelyn Score: 10 (6/23/2022  8:17 PM)  Modified Emelyn Score: 20 (6/23/2022  8:17 PM)

## 2022-07-08 ENCOUNTER — OFFICE VISIT (OUTPATIENT)
Dept: ORTHOPEDICS | Facility: CLINIC | Age: 52
End: 2022-07-08
Payer: COMMERCIAL

## 2022-07-08 VITALS
BODY MASS INDEX: 39.65 KG/M2 | HEIGHT: 65 IN | WEIGHT: 238 LBS | HEART RATE: 84 BPM | SYSTOLIC BLOOD PRESSURE: 111 MMHG | DIASTOLIC BLOOD PRESSURE: 64 MMHG

## 2022-07-08 DIAGNOSIS — Z98.890 STATUS POST ORIF OF FRACTURE OF ANKLE: Primary | ICD-10-CM

## 2022-07-08 DIAGNOSIS — Z87.81 STATUS POST ORIF OF FRACTURE OF ANKLE: Primary | ICD-10-CM

## 2022-07-08 PROCEDURE — 3074F SYST BP LT 130 MM HG: CPT | Mod: CPTII,,, | Performed by: NURSE PRACTITIONER

## 2022-07-08 PROCEDURE — 4010F PR ACE/ARB THEARPY RXD/TAKEN: ICD-10-PCS | Mod: CPTII,,, | Performed by: NURSE PRACTITIONER

## 2022-07-08 PROCEDURE — 99024 PR POST-OP FOLLOW-UP VISIT: ICD-10-PCS | Mod: ,,, | Performed by: NURSE PRACTITIONER

## 2022-07-08 PROCEDURE — 1159F PR MEDICATION LIST DOCUMENTED IN MEDICAL RECORD: ICD-10-PCS | Mod: CPTII,,, | Performed by: NURSE PRACTITIONER

## 2022-07-08 PROCEDURE — 3078F DIAST BP <80 MM HG: CPT | Mod: CPTII,,, | Performed by: NURSE PRACTITIONER

## 2022-07-08 PROCEDURE — 99024 POSTOP FOLLOW-UP VISIT: CPT | Mod: ,,, | Performed by: NURSE PRACTITIONER

## 2022-07-08 PROCEDURE — 3008F PR BODY MASS INDEX (BMI) DOCUMENTED: ICD-10-PCS | Mod: CPTII,,, | Performed by: NURSE PRACTITIONER

## 2022-07-08 PROCEDURE — 3074F PR MOST RECENT SYSTOLIC BLOOD PRESSURE < 130 MM HG: ICD-10-PCS | Mod: CPTII,,, | Performed by: NURSE PRACTITIONER

## 2022-07-08 PROCEDURE — 3008F BODY MASS INDEX DOCD: CPT | Mod: CPTII,,, | Performed by: NURSE PRACTITIONER

## 2022-07-08 PROCEDURE — 1159F MED LIST DOCD IN RCRD: CPT | Mod: CPTII,,, | Performed by: NURSE PRACTITIONER

## 2022-07-08 PROCEDURE — 3078F PR MOST RECENT DIASTOLIC BLOOD PRESSURE < 80 MM HG: ICD-10-PCS | Mod: CPTII,,, | Performed by: NURSE PRACTITIONER

## 2022-07-08 PROCEDURE — 4010F ACE/ARB THERAPY RXD/TAKEN: CPT | Mod: CPTII,,, | Performed by: NURSE PRACTITIONER

## 2022-07-08 RX ORDER — IBUPROFEN 800 MG/1
800 TABLET ORAL 3 TIMES DAILY
Qty: 21 TABLET | Refills: 0 | Status: SHIPPED | OUTPATIENT
Start: 2022-07-08 | End: 2022-07-15

## 2022-07-08 RX ORDER — METHOCARBAMOL 750 MG/1
750 TABLET, FILM COATED ORAL 3 TIMES DAILY
Qty: 21 TABLET | Refills: 0 | Status: SHIPPED | OUTPATIENT
Start: 2022-07-08 | End: 2022-07-15

## 2022-07-08 NOTE — PROGRESS NOTES
Chief Complaint:   Chief Complaint   Patient presents with    Right Ankle - Post-op Evaluation    Post-op Evaluation     15 day s/p ORIF of Right trimaeolar ankle sx 6/23/22 GL 9/21/22, patient stated she doesnt really need her pain medication unless she gets restless at night from the pain for the most part no pain, presents today with her cast and scooter       History of present illness:  6/23/22: Open reduction internal fixation of right trimalleolar ankle    She returns today. Her pain is under good control. Compliant with NWB.     Musculoskeletal:   Right ankle incisions healing. Without erythema, drainage, or signs of infection. + edema, + bruising.      Assessment: Status post ORIF of fracture of ankle    Other orders  -     ibuprofen (ADVIL,MOTRIN) 800 MG tablet; Take 1 tablet (800 mg total) by mouth 3 (three) times daily. for 7 days  Dispense: 21 tablet; Refill: 0  -     methocarbamoL (ROBAXIN) 750 MG Tab; Take 1 tablet (750 mg total) by mouth 3 (three) times daily. for 7 days  Dispense: 21 tablet; Refill: 0        Plan:  Doing well s/p above. Sutures out today. We will transition her into a boot. Continue NWB. Follow up in 4 weeks with xrays of the right ankle.

## 2022-08-03 ENCOUNTER — HOSPITAL ENCOUNTER (OUTPATIENT)
Dept: RADIOLOGY | Facility: CLINIC | Age: 52
Discharge: HOME | End: 2022-08-03
Attending: ORTHOPAEDIC SURGERY
Payer: COMMERCIAL

## 2022-08-03 ENCOUNTER — OFFICE VISIT (OUTPATIENT)
Dept: ORTHOPEDICS | Facility: CLINIC | Age: 52
End: 2022-08-03
Payer: COMMERCIAL

## 2022-08-03 VITALS
SYSTOLIC BLOOD PRESSURE: 111 MMHG | BODY MASS INDEX: 39.65 KG/M2 | HEART RATE: 84 BPM | DIASTOLIC BLOOD PRESSURE: 64 MMHG | WEIGHT: 238 LBS | HEIGHT: 65 IN

## 2022-08-03 DIAGNOSIS — M25.571 ACUTE RIGHT ANKLE PAIN: ICD-10-CM

## 2022-08-03 DIAGNOSIS — S82.851D CLOSED TRIMALLEOLAR FRACTURE OF RIGHT ANKLE WITH ROUTINE HEALING, SUBSEQUENT ENCOUNTER: Primary | ICD-10-CM

## 2022-08-03 PROCEDURE — 3008F BODY MASS INDEX DOCD: CPT | Mod: CPTII,,, | Performed by: ORTHOPAEDIC SURGERY

## 2022-08-03 PROCEDURE — 1159F PR MEDICATION LIST DOCUMENTED IN MEDICAL RECORD: ICD-10-PCS | Mod: CPTII,,, | Performed by: ORTHOPAEDIC SURGERY

## 2022-08-03 PROCEDURE — 1159F MED LIST DOCD IN RCRD: CPT | Mod: CPTII,,, | Performed by: ORTHOPAEDIC SURGERY

## 2022-08-03 PROCEDURE — 99024 PR POST-OP FOLLOW-UP VISIT: ICD-10-PCS | Mod: ,,, | Performed by: ORTHOPAEDIC SURGERY

## 2022-08-03 PROCEDURE — 3008F PR BODY MASS INDEX (BMI) DOCUMENTED: ICD-10-PCS | Mod: CPTII,,, | Performed by: ORTHOPAEDIC SURGERY

## 2022-08-03 PROCEDURE — 4010F PR ACE/ARB THEARPY RXD/TAKEN: ICD-10-PCS | Mod: CPTII,,, | Performed by: ORTHOPAEDIC SURGERY

## 2022-08-03 PROCEDURE — 3074F PR MOST RECENT SYSTOLIC BLOOD PRESSURE < 130 MM HG: ICD-10-PCS | Mod: CPTII,,, | Performed by: ORTHOPAEDIC SURGERY

## 2022-08-03 PROCEDURE — 73610 X-RAY EXAM OF ANKLE: CPT | Mod: RT,,, | Performed by: ORTHOPAEDIC SURGERY

## 2022-08-03 PROCEDURE — 4010F ACE/ARB THERAPY RXD/TAKEN: CPT | Mod: CPTII,,, | Performed by: ORTHOPAEDIC SURGERY

## 2022-08-03 PROCEDURE — 3074F SYST BP LT 130 MM HG: CPT | Mod: CPTII,,, | Performed by: ORTHOPAEDIC SURGERY

## 2022-08-03 PROCEDURE — 73610 XR ANKLE COMPLETE 3 VIEW RIGHT: ICD-10-PCS | Mod: RT,,, | Performed by: ORTHOPAEDIC SURGERY

## 2022-08-03 PROCEDURE — 3078F DIAST BP <80 MM HG: CPT | Mod: CPTII,,, | Performed by: ORTHOPAEDIC SURGERY

## 2022-08-03 PROCEDURE — 99024 POSTOP FOLLOW-UP VISIT: CPT | Mod: ,,, | Performed by: ORTHOPAEDIC SURGERY

## 2022-08-03 PROCEDURE — 3078F PR MOST RECENT DIASTOLIC BLOOD PRESSURE < 80 MM HG: ICD-10-PCS | Mod: CPTII,,, | Performed by: ORTHOPAEDIC SURGERY

## 2022-08-03 NOTE — PROGRESS NOTES
Chief Complaint:   Chief Complaint   Patient presents with    Right Ankle - Pain    Ankle Injury     6wk s/p ORIF Right ankle triamal ankle sx 6/23/22 GL 9/21/22, patient states she doesnt really have any pain unless she does alot is ready to start PT       History of present illness:  6/23/22: Open reduction internal fixation of right trimalleolar ankle    She returns today. Her pain is under good control. Compliant with NWB.     Musculoskeletal:   Right ankle incisions healed. Without erythema, drainage, or signs of infection. Expected stiffness. DNVI     Assessment: Closed trimalleolar fracture of right ankle with routine healing, subsequent encounter  -     X-Ray Ankle Complete Right; Future; Expected date: 08/03/2022        Plan:  Doing well s/p above.  She can begin weight-bearing in the boot next week.  I will see her back in 6 weeks radiographs of the right ankle

## 2022-08-30 ENCOUNTER — PATIENT MESSAGE (OUTPATIENT)
Dept: ENDOSCOPY | Facility: HOSPITAL | Age: 52
End: 2022-08-30
Payer: COMMERCIAL

## 2022-09-14 ENCOUNTER — HOSPITAL ENCOUNTER (OUTPATIENT)
Dept: RADIOLOGY | Facility: CLINIC | Age: 52
Discharge: HOME | End: 2022-09-14
Attending: ORTHOPAEDIC SURGERY
Payer: COMMERCIAL

## 2022-09-14 ENCOUNTER — OFFICE VISIT (OUTPATIENT)
Dept: ORTHOPEDICS | Facility: CLINIC | Age: 52
End: 2022-09-14
Payer: COMMERCIAL

## 2022-09-14 VITALS — WEIGHT: 238 LBS | HEIGHT: 65 IN | BODY MASS INDEX: 39.65 KG/M2

## 2022-09-14 DIAGNOSIS — M25.571 ACUTE RIGHT ANKLE PAIN: ICD-10-CM

## 2022-09-14 DIAGNOSIS — S82.851D CLOSED TRIMALLEOLAR FRACTURE OF ANKLE WITH ROUTINE HEALING, RIGHT: Primary | ICD-10-CM

## 2022-09-14 PROCEDURE — 1159F MED LIST DOCD IN RCRD: CPT | Mod: CPTII,,, | Performed by: NURSE PRACTITIONER

## 2022-09-14 PROCEDURE — 73610 XR ANKLE COMPLETE 3 VIEW RIGHT: ICD-10-PCS | Mod: RT,,, | Performed by: ORTHOPAEDIC SURGERY

## 2022-09-14 PROCEDURE — 1159F PR MEDICATION LIST DOCUMENTED IN MEDICAL RECORD: ICD-10-PCS | Mod: CPTII,,, | Performed by: NURSE PRACTITIONER

## 2022-09-14 PROCEDURE — 99024 PR POST-OP FOLLOW-UP VISIT: ICD-10-PCS | Mod: ,,, | Performed by: NURSE PRACTITIONER

## 2022-09-14 PROCEDURE — 73610 X-RAY EXAM OF ANKLE: CPT | Mod: RT,,, | Performed by: ORTHOPAEDIC SURGERY

## 2022-09-14 PROCEDURE — 3008F BODY MASS INDEX DOCD: CPT | Mod: CPTII,,, | Performed by: NURSE PRACTITIONER

## 2022-09-14 PROCEDURE — 3008F PR BODY MASS INDEX (BMI) DOCUMENTED: ICD-10-PCS | Mod: CPTII,,, | Performed by: NURSE PRACTITIONER

## 2022-09-14 PROCEDURE — 99024 POSTOP FOLLOW-UP VISIT: CPT | Mod: ,,, | Performed by: NURSE PRACTITIONER

## 2022-09-14 PROCEDURE — 4010F PR ACE/ARB THEARPY RXD/TAKEN: ICD-10-PCS | Mod: CPTII,,, | Performed by: NURSE PRACTITIONER

## 2022-09-14 PROCEDURE — 4010F ACE/ARB THERAPY RXD/TAKEN: CPT | Mod: CPTII,,, | Performed by: NURSE PRACTITIONER

## 2022-09-14 NOTE — PROGRESS NOTES
Chief Complaint:   Chief Complaint   Patient presents with    Right Ankle - Follow-up    Follow-up     6wk F/U Right Trimal ankle fx SX 6/23/22 GL 9/21/22, patient states she is feeling much better still ambulating with the boot would like to go without it because its starting to hurt her back       History of present illness:  6/23/22: Open reduction internal fixation of right trimalleolar ankle    She returns today. Her pain is under good control. Compliant in the boot. She reports some low back pain, taking Mobic 7.5mg.     Musculoskeletal:   Right ankle without obvious deformity. ROM near full without pain.      Assessment: Closed trimalleolar fracture of ankle with routine healing, right  -     X-Ray Ankle Complete Right; Future; Expected date: 09/14/2022      Plan:  Doing well s/p above. She can discontinue the boot. Recommended increasing to Mobic 15mg. Follow up in 3 months with xrays of the right ankle.

## 2022-09-15 ENCOUNTER — HISTORICAL (OUTPATIENT)
Dept: ADMINISTRATIVE | Facility: HOSPITAL | Age: 52
End: 2022-09-15
Payer: COMMERCIAL

## 2022-09-16 ENCOUNTER — HISTORICAL (OUTPATIENT)
Dept: ADMINISTRATIVE | Facility: HOSPITAL | Age: 52
End: 2022-09-16
Payer: COMMERCIAL

## 2022-10-02 ENCOUNTER — OFFICE VISIT (OUTPATIENT)
Dept: URGENT CARE | Facility: CLINIC | Age: 52
End: 2022-10-02
Payer: COMMERCIAL

## 2022-10-02 VITALS
DIASTOLIC BLOOD PRESSURE: 82 MMHG | HEART RATE: 104 BPM | RESPIRATION RATE: 18 BRPM | TEMPERATURE: 98 F | SYSTOLIC BLOOD PRESSURE: 132 MMHG | BODY MASS INDEX: 42.99 KG/M2 | HEIGHT: 65 IN | WEIGHT: 258 LBS | OXYGEN SATURATION: 97 %

## 2022-10-02 DIAGNOSIS — M25.562 ACUTE PAIN OF LEFT KNEE: Primary | ICD-10-CM

## 2022-10-02 DIAGNOSIS — M17.12 ARTHRITIS OF KNEE, LEFT: ICD-10-CM

## 2022-10-02 PROCEDURE — 3079F PR MOST RECENT DIASTOLIC BLOOD PRESSURE 80-89 MM HG: ICD-10-PCS | Mod: CPTII,,, | Performed by: PHYSICIAN ASSISTANT

## 2022-10-02 PROCEDURE — 96372 PR INJECTION,THERAP/PROPH/DIAG2ST, IM OR SUBCUT: ICD-10-PCS | Mod: ,,, | Performed by: PHYSICIAN ASSISTANT

## 2022-10-02 PROCEDURE — 3008F BODY MASS INDEX DOCD: CPT | Mod: CPTII,,, | Performed by: PHYSICIAN ASSISTANT

## 2022-10-02 PROCEDURE — 1159F MED LIST DOCD IN RCRD: CPT | Mod: CPTII,,, | Performed by: PHYSICIAN ASSISTANT

## 2022-10-02 PROCEDURE — 99204 PR OFFICE/OUTPT VISIT, NEW, LEVL IV, 45-59 MIN: ICD-10-PCS | Mod: 25,,, | Performed by: PHYSICIAN ASSISTANT

## 2022-10-02 PROCEDURE — 4010F PR ACE/ARB THEARPY RXD/TAKEN: ICD-10-PCS | Mod: CPTII,,, | Performed by: PHYSICIAN ASSISTANT

## 2022-10-02 PROCEDURE — 96372 THER/PROPH/DIAG INJ SC/IM: CPT | Mod: ,,, | Performed by: PHYSICIAN ASSISTANT

## 2022-10-02 PROCEDURE — 3075F PR MOST RECENT SYSTOLIC BLOOD PRESS GE 130-139MM HG: ICD-10-PCS | Mod: CPTII,,, | Performed by: PHYSICIAN ASSISTANT

## 2022-10-02 PROCEDURE — 3008F PR BODY MASS INDEX (BMI) DOCUMENTED: ICD-10-PCS | Mod: CPTII,,, | Performed by: PHYSICIAN ASSISTANT

## 2022-10-02 PROCEDURE — 1160F RVW MEDS BY RX/DR IN RCRD: CPT | Mod: CPTII,,, | Performed by: PHYSICIAN ASSISTANT

## 2022-10-02 PROCEDURE — 4010F ACE/ARB THERAPY RXD/TAKEN: CPT | Mod: CPTII,,, | Performed by: PHYSICIAN ASSISTANT

## 2022-10-02 PROCEDURE — 3075F SYST BP GE 130 - 139MM HG: CPT | Mod: CPTII,,, | Performed by: PHYSICIAN ASSISTANT

## 2022-10-02 PROCEDURE — 1159F PR MEDICATION LIST DOCUMENTED IN MEDICAL RECORD: ICD-10-PCS | Mod: CPTII,,, | Performed by: PHYSICIAN ASSISTANT

## 2022-10-02 PROCEDURE — 99204 OFFICE O/P NEW MOD 45 MIN: CPT | Mod: 25,,, | Performed by: PHYSICIAN ASSISTANT

## 2022-10-02 PROCEDURE — 3079F DIAST BP 80-89 MM HG: CPT | Mod: CPTII,,, | Performed by: PHYSICIAN ASSISTANT

## 2022-10-02 PROCEDURE — 1160F PR REVIEW ALL MEDS BY PRESCRIBER/CLIN PHARMACIST DOCUMENTED: ICD-10-PCS | Mod: CPTII,,, | Performed by: PHYSICIAN ASSISTANT

## 2022-10-02 RX ORDER — TRAMADOL HYDROCHLORIDE 50 MG/1
50 TABLET ORAL EVERY 8 HOURS PRN
Qty: 15 TABLET | Refills: 0 | Status: SHIPPED | OUTPATIENT
Start: 2022-10-02 | End: 2022-10-07

## 2022-10-02 RX ORDER — METHYLPREDNISOLONE 4 MG/1
TABLET ORAL
Qty: 1 EACH | Refills: 0 | Status: SHIPPED | OUTPATIENT
Start: 2022-10-02 | End: 2023-02-03

## 2022-10-02 RX ORDER — KETOROLAC TROMETHAMINE 30 MG/ML
60 INJECTION, SOLUTION INTRAMUSCULAR; INTRAVENOUS
Status: COMPLETED | OUTPATIENT
Start: 2022-10-02 | End: 2022-10-02

## 2022-10-02 RX ORDER — KETOROLAC TROMETHAMINE 30 MG/ML
60 INJECTION, SOLUTION INTRAMUSCULAR; INTRAVENOUS
Status: DISCONTINUED | OUTPATIENT
Start: 2022-10-02 | End: 2022-10-02

## 2022-10-02 RX ADMIN — KETOROLAC TROMETHAMINE 60 MG: 30 INJECTION, SOLUTION INTRAMUSCULAR; INTRAVENOUS at 10:10

## 2022-10-02 NOTE — PATIENT INSTRUCTIONS
X-ray negative for fracture but concern for degenerative changes and arthritis.  Recommend Ace wrap knee for comfort and support elevate and ice to help reduce swelling and inflammation.  Alternate Tylenol and ibuprofen or Mobic for pain inflammation.  Steroid Dosepak to help reduce pain and inflammation.  Tramadol sparingly lowest dose as needed for severe pain at rest.  Do not take pain medication, drive or operate machinery.  Contact orthopedist this week for follow-up appointment with knee pain swelling arthritis re-evaluation for continued long-term care plan

## 2022-10-02 NOTE — PROGRESS NOTES
"Subjective:       Patient ID: Jadyn Wilder is a 52 y.o. female.    Vitals:  height is 5' 5" (1.651 m) and weight is 117 kg (258 lb). Her temperature is 97.9 °F (36.6 °C). Her blood pressure is 132/82 and her pulse is 104. Her respiration is 18 and oxygen saturation is 97%.     Chief Complaint: Other Misc (Throbbing pain, left knee. - Entered by patient) and Knee Pain (Left knee pain, broke ankle on right side she seems to think the compensating for ankle injury may have cause left knee pain, started Friday )    Obese female with past right ankle sugery reports shifting weight to left leg over the last 3 days while standing in kitchen cooking denies accident or trauma now with anterior left knee pain and swelling presents to urgent clinc for evlaution not improved with Mobic last night   Knee Pain     Additional comments: Left knee pain, broke ankle on right side she seems   to think the compensating for ankle injury may have cause left knee pain,   started Friday     Knee Pain   The incident occurred at home. There was no injury mechanism. The pain is present in the left knee. Pertinent negatives include no numbness.     Constitution: Negative for chills and fever.   Neck: Negative for neck pain.   Cardiovascular:  Negative for chest pain.   Gastrointestinal:  Negative for abdominal pain.   Genitourinary:  Negative for dysuria, flank pain, bladder incontinence and pelvic pain.   Musculoskeletal:  Positive for pain, joint pain and joint swelling. Negative for trauma, abnormal ROM of joint, back pain, muscle cramps and muscle ache.   Skin: Negative.  Negative for erythema.   Neurological:  Negative for numbness and tingling.   Psychiatric/Behavioral: Negative.       Objective:      Physical Exam   Constitutional: She is oriented to person, place, and time. She appears well-developed.      Comments:Awake alert ambulatory pleasant     HENT:   Head: Normocephalic. Head is without abrasion, without contusion and without " laceration.   Mouth/Throat: Oropharynx is clear and moist and mucous membranes are normal.   Eyes: Conjunctivae, EOM and lids are normal.   Neck: Trachea normal and phonation normal. Neck supple.   Cardiovascular: Normal rate, regular rhythm and normal pulses.   Pulmonary/Chest: Effort normal and breath sounds normal.   Abdominal: Normal appearance.   Musculoskeletal: Normal range of motion.         General: Swelling and tenderness present. Normal range of motion.      Left knee: She exhibits swelling and ecchymosis. She exhibits normal range of motion, no laceration, no erythema, normal alignment, no LCL laxity and no MCL laxity. Tenderness found. Lateral joint line tenderness noted.   Neurological: no focal deficit. She is alert and oriented to person, place, and time.   Skin: Skin is warm, dry, intact and no rash. Capillary refill takes less than 2 seconds. not left kneeNo abrasion, No burn, No bruising, No erythema and No ecchymosis   Psychiatric: Her speech is normal and behavior is normal. Mood, judgment and thought content normal.   Nursing note and vitals reviewed.      Nitesh Barrios MD  [phone] 10/2/2022 STAT     Narrative & Impression  EXAMINATION:  XR KNEE 3 VIEW LEFT     CLINICAL HISTORY:  Pain in left knee     TECHNIQUE:  Two views of the left knee.     COMPARISON:  11/02/2020     FINDINGS:  Degenerative changes with tricompartmental osteophytes.  There is loss of disc space of the medial compartment.  No effusion.  No acute fracture.     Impression:     No acute abnormality of the knee.  Mild degenerative changes are noted.  Appears similar to prior.        Electronically signed by: Nitesh Barrios  Date:                                            10/02/2022  Time:                                           10:21           Exam Ended: 10/02/22 10:18           Assessment:       1. Acute pain of left knee    2. Arthritis of knee, left          Plan:     Patient understands x-ray results concern  for degenerative changes.  Patient accepts steroid pack and prefers tramadol over hydrocodone for discharge.  Patient states she will follow-up with her orthopedist Dr Emery.    X-ray negative for fracture but concern for degenerative changes and arthritis.  Recommend Ace wrap knee for comfort and support elevate and ice to help reduce swelling and inflammation.  Alternate Tylenol and ibuprofen or Mobic for pain inflammation.  Steroid Dosepak to help reduce pain and inflammation.  Tramadol sparingly lowest dose as needed for severe pain at rest.  Do not take pain medication, drive or operate machinery.  Contact orthopedist this week for follow-up appointment with knee pain swelling arthritis re-evaluation for continued long-term care plan    Acute pain of left knee  -     XR KNEE 3 VIEW LEFT; Future; Expected date: 10/02/2022  -     Discontinue: ketorolac injection 60 mg  -     ketorolac injection 60 mg    Arthritis of knee, left  -     methylPREDNISolone (MEDROL DOSEPACK) 4 mg tablet; use as directed  Dispense: 1 each; Refill: 0  -     traMADoL (ULTRAM) 50 mg tablet; Take 1 tablet (50 mg total) by mouth every 8 (eight) hours as needed for Pain.  Dispense: 15 tablet; Refill: 0

## 2022-10-04 ENCOUNTER — PATIENT MESSAGE (OUTPATIENT)
Dept: ENDOSCOPY | Facility: HOSPITAL | Age: 52
End: 2022-10-04
Payer: COMMERCIAL

## 2022-10-05 ENCOUNTER — PATIENT MESSAGE (OUTPATIENT)
Dept: ORTHOPEDICS | Facility: CLINIC | Age: 52
End: 2022-10-05
Payer: COMMERCIAL

## 2022-10-05 RX ORDER — MELOXICAM 15 MG/1
15 TABLET ORAL DAILY
Qty: 14 TABLET | Refills: 2 | Status: SHIPPED | OUTPATIENT
Start: 2022-10-05 | End: 2023-02-03

## 2022-10-14 ENCOUNTER — LAB VISIT (OUTPATIENT)
Dept: LAB | Facility: HOSPITAL | Age: 52
End: 2022-10-14
Attending: NURSE PRACTITIONER
Payer: COMMERCIAL

## 2022-10-14 DIAGNOSIS — Z79.899 ENCOUNTER FOR LONG-TERM (CURRENT) USE OF OTHER MEDICATIONS: Primary | ICD-10-CM

## 2022-10-14 LAB
ALBUMIN SERPL-MCNC: 3.8 GM/DL (ref 3.5–5)
ALBUMIN/GLOB SERPL: 1.2 RATIO (ref 1.1–2)
ALP SERPL-CCNC: 78 UNIT/L (ref 40–150)
ALT SERPL-CCNC: 21 UNIT/L (ref 0–55)
AST SERPL-CCNC: 20 UNIT/L (ref 5–34)
BASOPHILS # BLD AUTO: 0.07 X10(3)/MCL (ref 0–0.2)
BASOPHILS NFR BLD AUTO: 0.6 %
BILIRUBIN DIRECT+TOT PNL SERPL-MCNC: 0.6 MG/DL
BUN SERPL-MCNC: 8.6 MG/DL (ref 9.8–20.1)
CALCIUM SERPL-MCNC: 9.1 MG/DL (ref 8.4–10.2)
CHLORIDE SERPL-SCNC: 102 MMOL/L (ref 98–107)
CO2 SERPL-SCNC: 26 MMOL/L (ref 22–29)
CREAT SERPL-MCNC: 0.62 MG/DL (ref 0.55–1.02)
DEPRECATED CALCIDIOL+CALCIFEROL SERPL-MC: 24.2 NG/ML (ref 30–80)
EOSINOPHIL # BLD AUTO: 0.06 X10(3)/MCL (ref 0–0.9)
EOSINOPHIL NFR BLD AUTO: 0.6 %
ERYTHROCYTE [DISTWIDTH] IN BLOOD BY AUTOMATED COUNT: 16.2 % (ref 11.5–17)
EST. AVERAGE GLUCOSE BLD GHB EST-MCNC: 102.5 MG/DL
ESTRADIOL SERPL HS-MCNC: 85 PG/ML
FOLATE SERPL-MCNC: 12.6 NG/ML (ref 7–31.4)
FSH SERPL-ACNC: 14.39 MIU/ML
GFR SERPLBLD CREATININE-BSD FMLA CKD-EPI: >60 MLS/MIN/1.73/M2
GLOBULIN SER-MCNC: 3.1 GM/DL (ref 2.4–3.5)
GLUCOSE SERPL-MCNC: 87 MG/DL (ref 74–100)
HBA1C MFR BLD: 5.2 %
HCT VFR BLD AUTO: 41.9 % (ref 37–47)
HGB BLD-MCNC: 13.3 GM/DL (ref 12–16)
IMM GRANULOCYTES # BLD AUTO: 0.03 X10(3)/MCL (ref 0–0.04)
IMM GRANULOCYTES NFR BLD AUTO: 0.3 %
LYMPHOCYTES # BLD AUTO: 3.77 X10(3)/MCL (ref 0.6–4.6)
LYMPHOCYTES NFR BLD AUTO: 34.7 %
MCH RBC QN AUTO: 29 PG (ref 27–31)
MCHC RBC AUTO-ENTMCNC: 31.7 MG/DL (ref 33–36)
MCV RBC AUTO: 91.5 FL (ref 80–94)
MONOCYTES # BLD AUTO: 0.9 X10(3)/MCL (ref 0.1–1.3)
MONOCYTES NFR BLD AUTO: 8.3 %
NEUTROPHILS # BLD AUTO: 6 X10(3)/MCL (ref 2.1–9.2)
NEUTROPHILS NFR BLD AUTO: 55.5 %
NRBC BLD AUTO-RTO: 0 %
PLATELET # BLD AUTO: 295 X10(3)/MCL (ref 130–400)
PMV BLD AUTO: 12 FL (ref 7.4–10.4)
POTASSIUM SERPL-SCNC: 4.4 MMOL/L (ref 3.5–5.1)
PROLACTIN LEVEL (OHS): 26.64 NG/ML (ref 5.18–26.53)
PROT SERPL-MCNC: 6.9 GM/DL (ref 6.4–8.3)
RBC # BLD AUTO: 4.58 X10(6)/MCL (ref 4.2–5.4)
SODIUM SERPL-SCNC: 134 MMOL/L (ref 136–145)
T4 FREE SERPL-MCNC: 0.79 NG/DL (ref 0.7–1.48)
TSH SERPL-ACNC: 1.06 UIU/ML (ref 0.35–4.94)
WBC # SPEC AUTO: 10.9 X10(3)/MCL (ref 4.5–11.5)

## 2022-10-14 PROCEDURE — 84146 ASSAY OF PROLACTIN: CPT

## 2022-10-14 PROCEDURE — 36415 COLL VENOUS BLD VENIPUNCTURE: CPT

## 2022-10-14 PROCEDURE — 82306 VITAMIN D 25 HYDROXY: CPT

## 2022-10-14 PROCEDURE — 84443 ASSAY THYROID STIM HORMONE: CPT

## 2022-10-14 PROCEDURE — 83036 HEMOGLOBIN GLYCOSYLATED A1C: CPT

## 2022-10-14 PROCEDURE — 82746 ASSAY OF FOLIC ACID SERUM: CPT

## 2022-10-14 PROCEDURE — 80053 COMPREHEN METABOLIC PANEL: CPT

## 2022-10-14 PROCEDURE — 84425 ASSAY OF VITAMIN B-1: CPT

## 2022-10-14 PROCEDURE — 84480 ASSAY TRIIODOTHYRONINE (T3): CPT

## 2022-10-14 PROCEDURE — 84402 ASSAY OF FREE TESTOSTERONE: CPT

## 2022-10-14 PROCEDURE — 82670 ASSAY OF TOTAL ESTRADIOL: CPT

## 2022-10-14 PROCEDURE — 83001 ASSAY OF GONADOTROPIN (FSH): CPT

## 2022-10-14 PROCEDURE — 85025 COMPLETE CBC W/AUTO DIFF WBC: CPT

## 2022-10-14 PROCEDURE — 84439 ASSAY OF FREE THYROXINE: CPT

## 2022-10-17 LAB — T3 SERPL IA-MCNC: 117 NG/DL (ref 80–200)

## 2022-10-18 LAB — VIT B1 BLD-SCNC: 98 NMOL/L (ref 70–180)

## 2022-10-28 ENCOUNTER — IMMUNIZATION (OUTPATIENT)
Dept: FAMILY MEDICINE | Facility: CLINIC | Age: 52
End: 2022-10-28
Payer: COMMERCIAL

## 2022-10-28 DIAGNOSIS — Z23 NEED FOR VACCINATION: Primary | ICD-10-CM

## 2022-10-28 PROCEDURE — 91312 COVID-19, MRNA, LNP-S, BIVALENT BOOSTER, PF, 30 MCG/0.3 ML DOSE: CPT | Mod: S$GLB,,, | Performed by: INTERNAL MEDICINE

## 2022-10-28 PROCEDURE — 0124A COVID-19, MRNA, LNP-S, BIVALENT BOOSTER, PF, 30 MCG/0.3 ML DOSE: ICD-10-PCS | Mod: CV19,S$GLB,, | Performed by: INTERNAL MEDICINE

## 2022-10-28 PROCEDURE — 0124A COVID-19, MRNA, LNP-S, BIVALENT BOOSTER, PF, 30 MCG/0.3 ML DOSE: CPT | Mod: CV19,S$GLB,, | Performed by: INTERNAL MEDICINE

## 2022-10-28 PROCEDURE — 91312 COVID-19, MRNA, LNP-S, BIVALENT BOOSTER, PF, 30 MCG/0.3 ML DOSE: ICD-10-PCS | Mod: S$GLB,,, | Performed by: INTERNAL MEDICINE

## 2022-10-31 ENCOUNTER — ANESTHESIA EVENT (OUTPATIENT)
Dept: ENDOSCOPY | Facility: HOSPITAL | Age: 52
End: 2022-10-31
Payer: COMMERCIAL

## 2022-10-31 ENCOUNTER — HOSPITAL ENCOUNTER (OUTPATIENT)
Facility: HOSPITAL | Age: 52
Discharge: HOME | End: 2022-10-31
Attending: INTERNAL MEDICINE | Admitting: INTERNAL MEDICINE
Payer: COMMERCIAL

## 2022-10-31 ENCOUNTER — ANESTHESIA (OUTPATIENT)
Dept: ENDOSCOPY | Facility: HOSPITAL | Age: 52
End: 2022-10-31
Payer: COMMERCIAL

## 2022-10-31 VITALS
RESPIRATION RATE: 12 BRPM | SYSTOLIC BLOOD PRESSURE: 115 MMHG | BODY MASS INDEX: 39.15 KG/M2 | HEIGHT: 65 IN | WEIGHT: 235 LBS | OXYGEN SATURATION: 99 % | HEART RATE: 77 BPM | DIASTOLIC BLOOD PRESSURE: 76 MMHG

## 2022-10-31 DIAGNOSIS — Z12.11 SPECIAL SCREENING FOR MALIGNANT NEOPLASMS, COLON: ICD-10-CM

## 2022-10-31 PROCEDURE — 37000009 HC ANESTHESIA EA ADD 15 MINS: Performed by: INTERNAL MEDICINE

## 2022-10-31 PROCEDURE — 25000003 PHARM REV CODE 250: Performed by: NURSE ANESTHETIST, CERTIFIED REGISTERED

## 2022-10-31 PROCEDURE — 88305 TISSUE EXAM BY PATHOLOGIST: CPT | Performed by: INTERNAL MEDICINE

## 2022-10-31 PROCEDURE — 63600175 PHARM REV CODE 636 W HCPCS: Performed by: NURSE ANESTHETIST, CERTIFIED REGISTERED

## 2022-10-31 PROCEDURE — 27201423 OPTIME MED/SURG SUP & DEVICES STERILE SUPPLY: Performed by: INTERNAL MEDICINE

## 2022-10-31 PROCEDURE — 45385 COLONOSCOPY W/LESION REMOVAL: CPT | Mod: PT | Performed by: INTERNAL MEDICINE

## 2022-10-31 PROCEDURE — 37000008 HC ANESTHESIA 1ST 15 MINUTES: Performed by: INTERNAL MEDICINE

## 2022-10-31 RX ORDER — PROPOFOL 10 MG/ML
INJECTION, EMULSION INTRAVENOUS
Status: DISCONTINUED | OUTPATIENT
Start: 2022-10-31 | End: 2022-10-31

## 2022-10-31 RX ORDER — PROPOFOL 10 MG/ML
VIAL (ML) INTRAVENOUS
Status: COMPLETED
Start: 2022-10-31 | End: 2022-10-31

## 2022-10-31 RX ORDER — LIDOCAINE HCL/PF 100 MG/5ML
SYRINGE (ML) INTRAVENOUS
Status: DISCONTINUED | OUTPATIENT
Start: 2022-10-31 | End: 2022-10-31

## 2022-10-31 RX ADMIN — LIDOCAINE HYDROCHLORIDE 100 MG: 20 INJECTION, SOLUTION INTRAVENOUS at 09:10

## 2022-10-31 RX ADMIN — SODIUM CHLORIDE, SODIUM GLUCONATE, SODIUM ACETATE, POTASSIUM CHLORIDE AND MAGNESIUM CHLORIDE: 526; 502; 368; 37; 30 INJECTION, SOLUTION INTRAVENOUS at 09:10

## 2022-10-31 RX ADMIN — PROPOFOL 30 MG: 10 INJECTION, EMULSION INTRAVENOUS at 09:10

## 2022-10-31 RX ADMIN — PROPOFOL 50 MG: 10 INJECTION, EMULSION INTRAVENOUS at 09:10

## 2022-10-31 RX ADMIN — PROPOFOL 20 MG: 10 INJECTION, EMULSION INTRAVENOUS at 09:10

## 2022-10-31 NOTE — TRANSFER OF CARE
"Anesthesia Transfer of Care Note    Patient: Jadyn Wilder    Procedure(s) Performed: Procedure(s) (LRB):  COLON (N/A)    Patient location: GI    Anesthesia Type: general (with natural airway)    Transport from OR: Transported from OR on room air with adequate spontaneous ventilation    Post pain: adequate analgesia    Post assessment: no apparent anesthetic complications and tolerated procedure well    Post vital signs: stable    Level of consciousness: awake and alert    Nausea/Vomiting: no nausea/vomiting    Complications: none    Transfer of care protocol was followed      Last vitals:   Visit Vitals  /78   Pulse 81   Resp 14   Ht 5' 5" (1.651 m)   Wt 106.6 kg (235 lb)   LMP  (LMP Unknown)   SpO2 96%   BMI 39.11 kg/m²     "

## 2022-10-31 NOTE — ANESTHESIA PREPROCEDURE EVALUATION
10/31/2022  Jadyn Wilder is a 52 y.o., female.      Pre-op Assessment    I have reviewed the Patient Summary Reports.     I have reviewed the Nursing Notes. I have reviewed the NPO Status.   I have reviewed the Medications.     Review of Systems  Cardiovascular:   Hypertension    Psych:   Psychiatric History anxiety          Physical Exam  General: Well nourished    Airway:  Mallampati: II / II  Mouth Opening: Normal  TM Distance: Normal  Tongue: Normal  Neck ROM: Normal ROM    Dental:  Intact    Chest/Lungs:  Clear to auscultation    Heart:  Rate: Normal        Anesthesia Plan  Type of Anesthesia, risks & benefits discussed:    Anesthesia Type: Gen Natural Airway, MAC  Intra-op Monitoring Plan: Standard ASA Monitors  Induction:  IV  Informed Consent: Informed consent signed with the Patient and all parties understand the risks and agree with anesthesia plan.  All questions answered.   ASA Score: 2    Ready For Surgery From Anesthesia Perspective.     .

## 2022-10-31 NOTE — ANESTHESIA POSTPROCEDURE EVALUATION
Anesthesia Post Evaluation    Patient: Jadyn Wilder    Procedure(s) Performed: Procedure(s) (LRB):  COLON (N/A)    Final Anesthesia Type: MAC      Patient location during evaluation: GI PACU  Patient participation: Yes- Able to Participate  Level of consciousness: awake and alert  Post-procedure vital signs: reviewed and stable  Pain management: adequate  Airway patency: patent  ROSMERY mitigation strategies: Multimodal analgesia    Anesthetic complications: no      Cardiovascular status: stable  Respiratory status: unassisted  Hydration status: euvolemic  Follow-up not needed.          Vitals Value Taken Time   /76 10/31/22 0946   Temp 37 10/31/22 1440   Pulse 77 10/31/22 0946   Resp 12 10/31/22 0946   SpO2 99 % 10/31/22 0946         No case tracking events are documented in the log.      Pain/Emelyn Score: Emelyn Score: 10 (10/31/2022  9:46 AM)

## 2022-10-31 NOTE — PROVATION PATIENT INSTRUCTIONS
Discharge Summary/Instructions after an Endoscopic Procedure  Patient Name: Jadyn Wilder  Patient MRN: [id number]  Patient YOB: 1970 Monday, October 31, 2022  Dayne Frias MD  Dear patient,  As a result of recent federal legislation (The Federal Cures Act), you may   receive lab or pathology results from your procedure in your [facility]   account before your physician is able to contact you. Your physician or   their representative will relay the results to you with their   recommendations at their soonest availability.  Thank you,  RESTRICTIONS:  During your procedure today, you received medications for sedation.  These   medications may affect your judgment, balance and coordination.  Therefore,   for 24 hours, you have the following restrictions:   - DO NOT drive a car, operate machinery, make legal/financial decisions,   sign important papers or drink alcohol.    ACTIVITY:  Today: no heavy lifting, straining or running due to procedural   sedation/anesthesia.  The following day: return to full activity including work.  DIET:  Eat and drink normally unless instructed otherwise.     TREATMENT FOR COMMON SIDE EFFECTS:  - Mild abdominal pain, nausea, belching, bloating or excessive gas:  rest,   eat lightly and use a heating pad.  - Sore Throat: treat with throat lozenges and/or gargle with warm salt   water.  - Because air was used during the procedure, expelling large amounts of air   from your rectum or belching is normal.  - If a bowel prep was taken, you may not have a bowel movement for 1-3 days.    This is normal.  SYMPTOMS TO WATCH FOR AND REPORT TO YOUR PHYSICIAN:  1. Abdominal pain or bloating, other than gas cramps.  2. Chest pain.  3. Back pain.  4. Signs of infection such as: chills or fever occurring within 24 hours   after the procedure.  5. Rectal bleeding, which would show as bright red, maroon, or black stools.   (A tablespoon of blood from the rectum is not serious, especially  if   hemorrhoids are present.)  6. Vomiting.  7. Weakness or dizziness.  GO DIRECTLY TO THE NEAREST EMERGENCY ROOM IF YOU HAVE ANY OF THE FOLLOWING:      Difficulty breathing              Chills and/or fever over 101 F   Persistent vomiting and/or vomiting blood   Severe abdominal pain   Severe chest pain   Black, tarry stools   Bleeding- more than one tablespoon   Any other symptom or condition that you feel may need urgent attention  Your doctor recommends these additional instructions:  If any biopsies were taken, your doctors clinic will contact you in 1 to 2   weeks with any results.  - Discharge patient to home (ambulatory).   - Resume previous diet.   - Continue present medications.   - Await pathology results.   - Repeat colonoscopy in 3 years for surveillance.   - Return to GI office PRN.  For questions, problems or results please call your physician - Dayne Frias MD at Work:  [phone].  [facility] [place], EMERGENCY ROOM PHONE NUMBER: [phone]  IF A COMPLICATION OR EMERGENCY SITUATION ARISES AND YOU ARE UNABLE TO REACH   YOUR PHYSICIAN - GO DIRECTLY TO THE EMERGENCY ROOM.  Dayne Frias MD  10/31/2022 9:24:50 AM  This report has been verified and signed electronically.  Dear patient,  As a result of recent federal legislation (The Federal Cures Act), you may   receive lab or pathology results from your procedure in your [facility]   account before your physician is able to contact you. Your physician or   their representative will relay the results to you with their   recommendations at their soonest availability.  Thank you,  PROVATION

## 2022-10-31 NOTE — H&P
"[facility]    CC: Screening    HPI 52 y.o. female here for screening colonoscopy. No current complaints.     Past Medical History  Past Medical History:   Diagnosis Date    ADD (attention deficit disorder)     Anxiety     and depression    Hypertension     Screening for malignant neoplasm of colon          Review of Systems  General ROS: negative for chills, fever or weight loss  Cardiovascular ROS: no chest pain or dyspnea on exertion  Gastrointestinal ROS: no abdominal pain, change in bowel habits, or black/ bloody stools    Physical Examination  Ht 5' 5" (1.651 m)   Wt 106.6 kg (235 lb)   LMP  (LMP Unknown)   BMI 39.11 kg/m²   General appearance: alert, cooperative, no distress  HENT: Normocephalic, atraumatic, neck symmetrical, no nasal discharge   Lungs: clear to auscultation bilaterally, no dullness to percussion bilaterally  Heart: regular rate and rhythm without rub; no displacement of the PMI   Abdomen: soft, non-tender; bowel sounds normoactive; no organomegaly  Extremities: extremities symmetric; no clubbing, cyanosis, or edema  Neurologic: Alert and oriented X 3, normal strength, normal coordination and gait    Labs:  Lab Results   Component Value Date    WBC 10.9 10/14/2022    HGB 13.3 10/14/2022    HCT 41.9 10/14/2022    MCV 91.5 10/14/2022     10/14/2022           Imaging:    I have personally reviewed and interpreted these images.    Assessment:   Screening    Plan:  Colonoscopy      MCKENNA Frias Jr., M.D.  [facility]    "

## 2022-11-02 LAB
DHEA SERPL-MCNC: NORMAL
ESTROGEN SERPL-MCNC: NORMAL PG/ML
INSULIN SERPL-ACNC: NORMAL U[IU]/ML
LAB AP CLINICAL INFORMATION: NORMAL
LAB AP GROSS DESCRIPTION: NORMAL
LAB AP REPORT FOOTNOTES: NORMAL
T3RU NFR SERPL: NORMAL %
TESTOST FREE SERPL-MCNC: 0.92 NG/DL
TESTOST SERPL-MCNC: 49 NG/DL (ref 8–60)

## 2022-11-08 ENCOUNTER — PATIENT MESSAGE (OUTPATIENT)
Dept: ORTHOPEDICS | Facility: CLINIC | Age: 52
End: 2022-11-08
Payer: COMMERCIAL

## 2022-11-18 ENCOUNTER — HOSPITAL ENCOUNTER (OUTPATIENT)
Dept: RADIOLOGY | Facility: HOSPITAL | Age: 52
Discharge: HOME | End: 2022-11-18
Attending: OBSTETRICS & GYNECOLOGY
Payer: COMMERCIAL

## 2022-11-18 DIAGNOSIS — Z12.31 BREAST CANCER SCREENING BY MAMMOGRAM: ICD-10-CM

## 2022-11-18 PROCEDURE — 77067 MAMMO DIGITAL SCREENING BILAT WITH TOMO: ICD-10-PCS | Mod: 26,,, | Performed by: RADIOLOGY

## 2022-11-18 PROCEDURE — 77063 MAMMO DIGITAL SCREENING BILAT WITH TOMO: ICD-10-PCS | Mod: 26,,, | Performed by: RADIOLOGY

## 2022-11-18 PROCEDURE — 77063 BREAST TOMOSYNTHESIS BI: CPT | Mod: 26,,, | Performed by: RADIOLOGY

## 2022-11-18 PROCEDURE — 77067 SCR MAMMO BI INCL CAD: CPT | Mod: 26,,, | Performed by: RADIOLOGY

## 2022-11-18 PROCEDURE — 77067 SCR MAMMO BI INCL CAD: CPT | Mod: TC

## 2022-11-30 ENCOUNTER — HOSPITAL ENCOUNTER (OUTPATIENT)
Dept: RADIOLOGY | Facility: CLINIC | Age: 52
Discharge: HOME | End: 2022-11-30
Attending: ORTHOPAEDIC SURGERY
Payer: COMMERCIAL

## 2022-11-30 ENCOUNTER — OFFICE VISIT (OUTPATIENT)
Dept: ORTHOPEDICS | Facility: CLINIC | Age: 52
End: 2022-11-30
Payer: COMMERCIAL

## 2022-11-30 ENCOUNTER — HOSPITAL ENCOUNTER (OUTPATIENT)
Dept: RADIOLOGY | Facility: CLINIC | Age: 52
Discharge: HOME | End: 2022-11-30
Attending: NURSE PRACTITIONER
Payer: COMMERCIAL

## 2022-11-30 VITALS — BODY MASS INDEX: 39.82 KG/M2 | WEIGHT: 239 LBS | HEIGHT: 65 IN

## 2022-11-30 DIAGNOSIS — M25.512 ACUTE PAIN OF LEFT SHOULDER: ICD-10-CM

## 2022-11-30 DIAGNOSIS — M25.511 ACUTE PAIN OF RIGHT SHOULDER: ICD-10-CM

## 2022-11-30 DIAGNOSIS — Z98.890 S/P ORIF (OPEN REDUCTION INTERNAL FIXATION) FRACTURE: ICD-10-CM

## 2022-11-30 DIAGNOSIS — Z87.81 S/P ORIF (OPEN REDUCTION INTERNAL FIXATION) FRACTURE: ICD-10-CM

## 2022-11-30 DIAGNOSIS — S82.851D CLOSED TRIMALLEOLAR FRACTURE OF RIGHT ANKLE WITH ROUTINE HEALING: Primary | ICD-10-CM

## 2022-11-30 DIAGNOSIS — M75.42 IMPINGEMENT SYNDROME OF LEFT SHOULDER: ICD-10-CM

## 2022-11-30 DIAGNOSIS — M75.41 SHOULDER IMPINGEMENT SYNDROME, RIGHT: ICD-10-CM

## 2022-11-30 PROCEDURE — 4010F PR ACE/ARB THEARPY RXD/TAKEN: ICD-10-PCS | Mod: CPTII,,, | Performed by: ORTHOPAEDIC SURGERY

## 2022-11-30 PROCEDURE — 73030 X-RAY EXAM OF SHOULDER: CPT | Mod: RT,,, | Performed by: ORTHOPAEDIC SURGERY

## 2022-11-30 PROCEDURE — 99213 OFFICE O/P EST LOW 20 MIN: CPT | Mod: 25,,, | Performed by: ORTHOPAEDIC SURGERY

## 2022-11-30 PROCEDURE — 73610 XR ANKLE COMPLETE 3 VIEW RIGHT: ICD-10-PCS | Mod: RT,,, | Performed by: NURSE PRACTITIONER

## 2022-11-30 PROCEDURE — 73030 X-RAY EXAM OF SHOULDER: CPT | Mod: LT,,, | Performed by: ORTHOPAEDIC SURGERY

## 2022-11-30 PROCEDURE — 1159F MED LIST DOCD IN RCRD: CPT | Mod: CPTII,,, | Performed by: ORTHOPAEDIC SURGERY

## 2022-11-30 PROCEDURE — 3008F BODY MASS INDEX DOCD: CPT | Mod: CPTII,,, | Performed by: ORTHOPAEDIC SURGERY

## 2022-11-30 PROCEDURE — 4010F ACE/ARB THERAPY RXD/TAKEN: CPT | Mod: CPTII,,, | Performed by: ORTHOPAEDIC SURGERY

## 2022-11-30 PROCEDURE — 73030 XR SHOULDER COMPLETE 2 OR MORE VIEWS RIGHT: ICD-10-PCS | Mod: RT,,, | Performed by: ORTHOPAEDIC SURGERY

## 2022-11-30 PROCEDURE — 3008F PR BODY MASS INDEX (BMI) DOCUMENTED: ICD-10-PCS | Mod: CPTII,,, | Performed by: ORTHOPAEDIC SURGERY

## 2022-11-30 PROCEDURE — 20610 LARGE JOINT ASPIRATION/INJECTION: BILATERAL SUBACROMIAL BURSA: ICD-10-PCS | Mod: 50,,, | Performed by: ORTHOPAEDIC SURGERY

## 2022-11-30 PROCEDURE — 20610 DRAIN/INJ JOINT/BURSA W/O US: CPT | Mod: 50,,, | Performed by: ORTHOPAEDIC SURGERY

## 2022-11-30 PROCEDURE — 99213 PR OFFICE/OUTPT VISIT, EST, LEVL III, 20-29 MIN: ICD-10-PCS | Mod: 25,,, | Performed by: ORTHOPAEDIC SURGERY

## 2022-11-30 PROCEDURE — 73610 X-RAY EXAM OF ANKLE: CPT | Mod: RT,,, | Performed by: NURSE PRACTITIONER

## 2022-11-30 PROCEDURE — 1159F PR MEDICATION LIST DOCUMENTED IN MEDICAL RECORD: ICD-10-PCS | Mod: CPTII,,, | Performed by: ORTHOPAEDIC SURGERY

## 2022-11-30 RX ORDER — BETAMETHASONE SODIUM PHOSPHATE AND BETAMETHASONE ACETATE 3; 3 MG/ML; MG/ML
12 INJECTION, SUSPENSION INTRA-ARTICULAR; INTRALESIONAL; INTRAMUSCULAR; SOFT TISSUE
Status: DISCONTINUED | OUTPATIENT
Start: 2022-11-30 | End: 2022-11-30 | Stop reason: HOSPADM

## 2022-11-30 RX ORDER — CHOLECALCIFEROL (VITAMIN D3) 25 MCG
1000 TABLET ORAL DAILY
COMMUNITY
End: 2023-06-18

## 2022-11-30 RX ORDER — LIDOCAINE HYDROCHLORIDE 20 MG/ML
2 INJECTION, SOLUTION INFILTRATION; PERINEURAL
Status: DISCONTINUED | OUTPATIENT
Start: 2022-11-30 | End: 2022-11-30 | Stop reason: HOSPADM

## 2022-11-30 RX ORDER — TRAMADOL HYDROCHLORIDE 50 MG/1
50 TABLET ORAL EVERY 8 HOURS PRN
Qty: 9 TABLET | Refills: 0 | Status: SHIPPED | OUTPATIENT
Start: 2022-11-30 | End: 2023-02-03

## 2022-11-30 RX ADMIN — LIDOCAINE HYDROCHLORIDE 2 MG: 20 INJECTION, SOLUTION INFILTRATION; PERINEURAL at 07:11

## 2022-11-30 RX ADMIN — BETAMETHASONE SODIUM PHOSPHATE AND BETAMETHASONE ACETATE 12 MG: 3; 3 INJECTION, SUSPENSION INTRA-ARTICULAR; INTRALESIONAL; INTRAMUSCULAR; SOFT TISSUE at 07:11

## 2022-11-30 NOTE — PROCEDURES
Large Joint Aspiration/Injection: bilateral subacromial bursa    Date/Time: 11/30/2022 7:45 AM  Performed by: Ezekiel Emery Jr., MD  Authorized by: Ezekiel Emery Jr., MD     Consent Done?:  Yes (Verbal)  Indications:  Pain  Site marked: the procedure site was marked    Timeout: prior to procedure the correct patient, procedure, and site was verified    Prep: patient was prepped and draped in usual sterile fashion      Local anesthesia used?: Yes    Local anesthetic:  Topical anesthetic    Details:  Needle Size:  21 G  Ultrasonic Guidance for needle placement?: No    Approach:  Posterior  Location:  Shoulder  Laterality:  Bilateral  Site:  Bilateral subacromial bursa  Medications (Right):  2 mg LIDOcaine HCL 20 mg/ml (2%) 20 mg/mL (2 %); 12 mg betamethasone acetate-betamethasone sodium phosphate 6 mg/mL  Medications (Left):  2 mg LIDOcaine HCL 20 mg/ml (2%) 20 mg/mL (2 %); 12 mg betamethasone acetate-betamethasone sodium phosphate 6 mg/mL  Patient tolerance:  Patient tolerated the procedure well with no immediate complications

## 2022-11-30 NOTE — PROGRESS NOTES
Chief Complaint:   Chief Complaint   Patient presents with    Right Shoulder - Pain    Left Shoulder - Pain    Shoulder Pain     JENNY shoulder pain, pain started 3 weeks ago, L side is worse, sharp pain with movement, aching sometimes, icyhot/tylenol, noPT/brace,       Consulting Physician: No ref. provider found    History of present illness:  6/23/22: Open reduction internal fixation of right trimalleolar ankle     She returns today.  Overall her ankle is doing well.  She is noticed some swelling in as she is increased her activities.  She is been more bothered by both of her shoulders.  She notices pain along both shoulders.  It is worse with activity.  It bothers her while driving for instance.  It has been going on since October of 2022.  She is tried some anti-inflammatory medicines and home exercise program without relief.    Past Medical History:   Diagnosis Date    ADD (attention deficit disorder)     Anxiety     and depression    Hypertension     Screening for malignant neoplasm of colon        Past Surgical History:   Procedure Laterality Date    KNEE ARTHROSCOPY      OPEN REDUCTION AND INTERNAL FIXATION (ORIF) OF INJURY OF ANKLE Right 06/23/2022    Procedure: ORIF, ANKLE-will start prone-Synthes;  Surgeon: Ezekiel Emery Jr., MD;  Location: [facility];  Service: Orthopedics;  Laterality: Right;    TUBAL LIGATION         Current Outpatient Medications   Medication Sig    amlodipine-valsartan (EXFORGE) 5-160 mg per tablet Take 1 tablet by mouth once daily.    FLUoxetine 40 MG capsule 40 mg Daily.    meloxicam (MOBIC) 15 MG tablet Take 1 tablet (15 mg total) by mouth once daily.    vitamin D (VITAMIN D3) 1000 units Tab Take 1,000 Units by mouth once daily.    VYVANSE 40 mg Cap Take 40 mg by mouth every morning.    ciprofloxacin HCl (CIPRO) 500 MG tablet Take 500 mg by mouth 2 (two) times daily.    methylPREDNISolone (MEDROL DOSEPACK) 4 mg tablet use as directed    ondansetron (ZOFRAN) 4 MG tablet Take 1 tablet  "(4 mg total) by mouth every 6 (six) hours as needed for Nausea.    oxyCODONE-acetaminophen (PERCOCET) 5-325 mg per tablet Take 1 tablet by mouth every 6 (six) hours as needed for Pain.    TRANEXAMIC ACID, BULK, MISC 650 mg by Misc.(Non-Drug; Combo Route) route.     No current facility-administered medications for this visit.       Review of patient's allergies indicates:  No Known Allergies    Family History   Problem Relation Age of Onset    Diabetes Mother     Hypertension Father        Social History     Socioeconomic History    Marital status: Significant Other   Tobacco Use    Smoking status: Former     Types: Cigarettes    Smokeless tobacco: Current   Substance and Sexual Activity    Alcohol use: Yes     Alcohol/week: 1.0 standard drink     Types: 1 Glasses of wine per week     Comment: occas    Drug use: Never    Sexual activity: Yes     Partners: Male     Birth control/protection: See Surgical Hx       Review of Systems:    Constitution:   Denies chills, fever, and sweats.  HENT:   Denies headaches or blurry vision.  Cardiovascular:  Denies chest pain or irregular heart beat.  Respiratory:   Denies cough or shortness of breath.  Gastrointestinal:  Denies abdominal pain, nausea, or vomiting.  Musculoskeletal:   Denies muscle cramps.  Neurological:   Denies dizziness or focal weakness.  Psychiatric/Behavior: Normal mental status.  Hematology/Lymph:  Denies bleeding problem or easy bruising/bleeding.  Skin:    Denies rash or suspicious lesions.    Examination:    Vital Signs:    Vitals:    11/30/22 0747   Weight: 108.4 kg (239 lb)   Height: 5' 5" (1.651 m)   PainSc:   7       Body mass index is 39.77 kg/m².    Constitution:   Well-developed, well nourished patient in no acute distress.  Neurological:   Alert and oriented x 3 and cooperative to examination.     Psychiatric/Behavior: Normal mental status.  Respiratory:   No shortness of breath.  Eyes:    Extraoccular muscles intact  Skin:    No scars, rash or " suspicious lesions.    MSK:   Shoulder Exam:                   Right        Left  Skin:                                   Normal     Normal  AC joint tenderness:           +                 +  Forward Flexion:                170            170  Abduction:                          180           180  External Rotation:               80              80  Internal Rotation:                80             80  Supraspinatus stress test: Neg           Neg  Hawkin's Impingement:     Neg           Neg  Neer Impingement:            +                +  Apprehension:                   Neg           Neg  Imperial's:                           Neg           Neg  Speed's test:                     Neg            Neg  Strength:  External Rotation:           5/5                5/5  Lift Off/belly press:          5/5                5/5    N-V status:                   Intact             Intact    C-spine: Normal ROM, NT      Imaging: X-rays ordered and images interpreted today personally by me of four views of bilateral shoulder showed normal bony alignment.  She does have some moderate AC joint arthritis.  Three views of the right ankle show healed fracture.         Assessment: Closed trimalleolar fracture of right ankle with routine healing    Impingement syndrome of left shoulder  -     X-Ray Shoulder 2 or More Views Left; Future; Expected date: 11/30/2022    Shoulder impingement syndrome, right  -     X-ray Shoulder 2 or More Views Right; Future; Expected date: 11/30/2022    S/P ORIF (open reduction internal fixation) fracture  -     X-Ray Ankle Complete Right; Future; Expected date: 11/30/2022        Plan:  Well status post above from her ankle surgery.  Activities as tolerated.  In regards to her shoulder will try bilateral subacromial injections today

## 2022-12-15 ENCOUNTER — PATIENT MESSAGE (OUTPATIENT)
Dept: RESEARCH | Facility: HOSPITAL | Age: 52
End: 2022-12-15
Payer: COMMERCIAL

## 2023-02-03 ENCOUNTER — OFFICE VISIT (OUTPATIENT)
Dept: URGENT CARE | Facility: CLINIC | Age: 53
End: 2023-02-03
Payer: COMMERCIAL

## 2023-02-03 VITALS
RESPIRATION RATE: 17 BRPM | BODY MASS INDEX: 39.82 KG/M2 | SYSTOLIC BLOOD PRESSURE: 118 MMHG | DIASTOLIC BLOOD PRESSURE: 80 MMHG | HEIGHT: 65 IN | WEIGHT: 239 LBS | OXYGEN SATURATION: 98 % | HEART RATE: 84 BPM | TEMPERATURE: 99 F

## 2023-02-03 DIAGNOSIS — R05.9 COUGH, UNSPECIFIED TYPE: ICD-10-CM

## 2023-02-03 DIAGNOSIS — J00 ACUTE NASOPHARYNGITIS: Primary | ICD-10-CM

## 2023-02-03 LAB
CTP QC/QA: YES
MOLECULAR STREP A: NEGATIVE
POC MOLECULAR INFLUENZA A AGN: NEGATIVE
POC MOLECULAR INFLUENZA B AGN: NEGATIVE
SARS-COV-2 RDRP RESP QL NAA+PROBE: NEGATIVE

## 2023-02-03 PROCEDURE — 99213 PR OFFICE/OUTPT VISIT, EST, LEVL III, 20-29 MIN: ICD-10-PCS | Mod: ,,, | Performed by: FAMILY MEDICINE

## 2023-02-03 PROCEDURE — 87651 POCT STREP A MOLECULAR: ICD-10-PCS | Mod: QW,,, | Performed by: FAMILY MEDICINE

## 2023-02-03 PROCEDURE — 1159F MED LIST DOCD IN RCRD: CPT | Mod: CPTII,,, | Performed by: FAMILY MEDICINE

## 2023-02-03 PROCEDURE — 1160F RVW MEDS BY RX/DR IN RCRD: CPT | Mod: CPTII,,, | Performed by: FAMILY MEDICINE

## 2023-02-03 PROCEDURE — 87635 SARS-COV-2 COVID-19 AMP PRB: CPT | Mod: QW,,, | Performed by: FAMILY MEDICINE

## 2023-02-03 PROCEDURE — 1160F PR REVIEW ALL MEDS BY PRESCRIBER/CLIN PHARMACIST DOCUMENTED: ICD-10-PCS | Mod: CPTII,,, | Performed by: FAMILY MEDICINE

## 2023-02-03 PROCEDURE — 99213 OFFICE O/P EST LOW 20 MIN: CPT | Mod: ,,, | Performed by: FAMILY MEDICINE

## 2023-02-03 PROCEDURE — 3008F BODY MASS INDEX DOCD: CPT | Mod: CPTII,,, | Performed by: FAMILY MEDICINE

## 2023-02-03 PROCEDURE — 87635: ICD-10-PCS | Mod: QW,,, | Performed by: FAMILY MEDICINE

## 2023-02-03 PROCEDURE — 3074F PR MOST RECENT SYSTOLIC BLOOD PRESSURE < 130 MM HG: ICD-10-PCS | Mod: CPTII,,, | Performed by: FAMILY MEDICINE

## 2023-02-03 PROCEDURE — 3074F SYST BP LT 130 MM HG: CPT | Mod: CPTII,,, | Performed by: FAMILY MEDICINE

## 2023-02-03 PROCEDURE — 3079F DIAST BP 80-89 MM HG: CPT | Mod: CPTII,,, | Performed by: FAMILY MEDICINE

## 2023-02-03 PROCEDURE — 87502 INFLUENZA DNA AMP PROBE: CPT | Mod: QW,,, | Performed by: FAMILY MEDICINE

## 2023-02-03 PROCEDURE — 1159F PR MEDICATION LIST DOCUMENTED IN MEDICAL RECORD: ICD-10-PCS | Mod: CPTII,,, | Performed by: FAMILY MEDICINE

## 2023-02-03 PROCEDURE — 87502 POCT INFLUENZA A/B MOLECULAR: ICD-10-PCS | Mod: QW,,, | Performed by: FAMILY MEDICINE

## 2023-02-03 PROCEDURE — 3079F PR MOST RECENT DIASTOLIC BLOOD PRESSURE 80-89 MM HG: ICD-10-PCS | Mod: CPTII,,, | Performed by: FAMILY MEDICINE

## 2023-02-03 PROCEDURE — 87651 STREP A DNA AMP PROBE: CPT | Mod: QW,,, | Performed by: FAMILY MEDICINE

## 2023-02-03 PROCEDURE — 3008F PR BODY MASS INDEX (BMI) DOCUMENTED: ICD-10-PCS | Mod: CPTII,,, | Performed by: FAMILY MEDICINE

## 2023-02-03 RX ORDER — ALBUTEROL SULFATE 90 UG/1
2 AEROSOL, METERED RESPIRATORY (INHALATION) EVERY 6 HOURS PRN
Qty: 18 G | Refills: 0 | Status: SHIPPED | OUTPATIENT
Start: 2023-02-03 | End: 2023-04-12

## 2023-02-03 NOTE — PATIENT INSTRUCTIONS
Discussed the physical finding , concerns of possible early testing examination.  Monitor the symptoms closely   Adequate hydration and rest.  For persistent symptoms return to clinic for strep swab as nurse's visit tomorrow  Tylenol and ibuprofen for fever, body aches and sore throat.   Warm saltwater gargles for sore throat.   Claritin 10 mg or Zyrtec 10 mg for nasal congestion  Robitussin-DM for cough and cold as needed and as directed  Strep test negative, COVID-19 test negative, flu test negative  Call or return to clinic for any questions  For persistent and worsening symptoms with signs of infection call clinic will consider antibiotics

## 2023-02-03 NOTE — PROGRESS NOTES
"Subjective:       Patient ID: Jadyn Wilder is a 53 y.o. female.    Vitals:  height is 5' 5" (1.651 m) and weight is 108.4 kg (239 lb). Her temperature is 98.7 °F (37.1 °C). Her blood pressure is 118/80 and her pulse is 84. Her respiration is 17 and oxygen saturation is 98%.     Chief Complaint: Cough (Cough, sore throat, earache, headache, sob, thick green phlem since yesterday. Tried taking tylenol, mucinex, and nyquil. )    HPI:  53-year-old female present to clinic with concerns of nasal congestion, sinus congestion, sore throat, headache since yesterday.  Noticing thick green phlegm.  Felt short of breath, no wheezing.  No history of asthma.  For all other medical conditions follows up with primary MD. reviewed the vital signs appears stable with O2 sats 98%.  Vaccinated for COVID-19.  No concerns of positive exposure to infections.      ROS  :  Constitutional : _No fatigue, _ Fever  HEENT : _No sore throat, no ear pain, no sinus congestion  Neck : No pain, range of motion present  Respiratory : _Coughing, mucus production  Cardiovascular : _No chest pain, no palpitations  Gastrointestinal : _No vomiting or diarrhea. No abdominal pain  Integumentary : _No skin rash or abnormal lesion  Neurologic_No headache, No dizziness   Objective:      Physical Exam    General : Alert and Oriented, No apparent distress, afebrile, sounds stuffy and congested  Neck - supple  HENT : Oropharynx no redness or swelling. Tonsils 3+ bilateral mild redness and swelling, palatal petechia ache, no exudate.  Bilateral TMs intact mild fluid no redness.   Respiratory : Bilateral equal breath sounds, nonlabored respirations  Cardiovascular : Rate, rhythm regular, normal volume pulse, no murmur  Integumentary : Warm, Dry and no rash    Assessment:       1. Acute nasopharyngitis    2. Cough, unspecified type         Plan:     Discussed the physical finding , concerns of possible early testing examination.  Monitor the symptoms closely "   Adequate hydration and rest.  For persistent symptoms return to clinic for strep swab as nurse's visit tomorrow  Tylenol and ibuprofen for fever, body aches and sore throat.   Warm saltwater gargles for sore throat.   Claritin 10 mg or Zyrtec 10 mg for nasal congestion  Robitussin-DM for cough and cold as needed and as directed  Strep test negative, COVID-19 test negative, flu test negative  Call or return to clinic for any questions    Acute nasopharyngitis    Cough, unspecified type  -     POCT COVID-19 Rapid Screening  -     POCT Influenza A/B Molecular  -     POCT Strep A, Molecular  -     albuterol (PROAIR HFA) 90 mcg/actuation inhaler; Inhale 2 puffs into the lungs every 6 (six) hours as needed for Wheezing or Shortness of Breath. Rescue  Dispense: 18 g; Refill: 0

## 2023-02-04 ENCOUNTER — CLINICAL SUPPORT (OUTPATIENT)
Dept: URGENT CARE | Facility: CLINIC | Age: 53
End: 2023-02-04
Payer: COMMERCIAL

## 2023-02-04 DIAGNOSIS — J02.9 SORE THROAT: Primary | ICD-10-CM

## 2023-02-04 LAB
CTP QC/QA: YES
MOLECULAR STREP A: NEGATIVE

## 2023-02-04 PROCEDURE — 87651 POCT STREP A MOLECULAR: ICD-10-PCS | Mod: QW,,, | Performed by: FAMILY MEDICINE

## 2023-02-04 PROCEDURE — 87651 STREP A DNA AMP PROBE: CPT | Mod: QW,,, | Performed by: FAMILY MEDICINE

## 2023-02-04 NOTE — PROGRESS NOTES
Pt returns to clinic for repeat strep test. Negative result today. Encouraged to continue Dr. Barrera's treatment plan given 02/03/2023 and follow up as needed.

## 2023-04-03 ENCOUNTER — PATIENT MESSAGE (OUTPATIENT)
Dept: ORTHOPEDICS | Facility: CLINIC | Age: 53
End: 2023-04-03
Payer: COMMERCIAL

## 2023-04-03 NOTE — TELEPHONE ENCOUNTER
Patient called today with B/P reading from 10 minutes ago that the nurse at work took. B/P is 118/78. She states she has made the changes in mediations as previously directed. She was unable to tell me the names of the medications as she is at work and does not have the bottles with her. She will continue to monitor her B/P and come in for nurse B/P checks.   sure

## 2023-04-04 DIAGNOSIS — M75.42 IMPINGEMENT SYNDROME OF LEFT SHOULDER: Primary | ICD-10-CM

## 2023-04-04 RX ORDER — TRAMADOL HYDROCHLORIDE 50 MG/1
50 TABLET ORAL EVERY 8 HOURS PRN
Qty: 21 EACH | Refills: 0 | Status: SHIPPED | OUTPATIENT
Start: 2023-04-04 | End: 2023-06-18 | Stop reason: ALTCHOICE

## 2023-04-04 RX ORDER — TRAMADOL HYDROCHLORIDE 50 MG/1
50 TABLET ORAL EVERY 6 HOURS
COMMUNITY
End: 2023-04-04 | Stop reason: SDUPTHER

## 2023-04-12 ENCOUNTER — OFFICE VISIT (OUTPATIENT)
Dept: ORTHOPEDICS | Facility: CLINIC | Age: 53
End: 2023-04-12
Payer: COMMERCIAL

## 2023-04-12 ENCOUNTER — HOSPITAL ENCOUNTER (OUTPATIENT)
Dept: RADIOLOGY | Facility: CLINIC | Age: 53
Discharge: HOME | End: 2023-04-12
Attending: ORTHOPAEDIC SURGERY
Payer: COMMERCIAL

## 2023-04-12 VITALS
OXYGEN SATURATION: 96 % | HEART RATE: 86 BPM | TEMPERATURE: 98 F | BODY MASS INDEX: 38.99 KG/M2 | SYSTOLIC BLOOD PRESSURE: 120 MMHG | WEIGHT: 234 LBS | HEIGHT: 65 IN | DIASTOLIC BLOOD PRESSURE: 83 MMHG

## 2023-04-12 DIAGNOSIS — M25.562 CHRONIC PAIN OF LEFT KNEE: Primary | ICD-10-CM

## 2023-04-12 DIAGNOSIS — M25.562 CHRONIC PAIN OF LEFT KNEE: ICD-10-CM

## 2023-04-12 DIAGNOSIS — G89.29 CHRONIC PAIN OF LEFT KNEE: ICD-10-CM

## 2023-04-12 DIAGNOSIS — G89.29 CHRONIC PAIN OF LEFT KNEE: Primary | ICD-10-CM

## 2023-04-12 DIAGNOSIS — M17.12 PRIMARY OSTEOARTHRITIS OF LEFT KNEE: ICD-10-CM

## 2023-04-12 PROCEDURE — 4010F PR ACE/ARB THEARPY RXD/TAKEN: ICD-10-PCS | Mod: CPTII,,, | Performed by: NURSE PRACTITIONER

## 2023-04-12 PROCEDURE — 73564 XR KNEE COMP 4 OR MORE VIEWS LEFT: ICD-10-PCS | Mod: LT,,, | Performed by: ORTHOPAEDIC SURGERY

## 2023-04-12 PROCEDURE — 3008F PR BODY MASS INDEX (BMI) DOCUMENTED: ICD-10-PCS | Mod: CPTII,,, | Performed by: NURSE PRACTITIONER

## 2023-04-12 PROCEDURE — 99213 OFFICE O/P EST LOW 20 MIN: CPT | Mod: 25,,, | Performed by: NURSE PRACTITIONER

## 2023-04-12 PROCEDURE — 3079F PR MOST RECENT DIASTOLIC BLOOD PRESSURE 80-89 MM HG: ICD-10-PCS | Mod: CPTII,,, | Performed by: NURSE PRACTITIONER

## 2023-04-12 PROCEDURE — 4010F ACE/ARB THERAPY RXD/TAKEN: CPT | Mod: CPTII,,, | Performed by: NURSE PRACTITIONER

## 2023-04-12 PROCEDURE — 1159F MED LIST DOCD IN RCRD: CPT | Mod: CPTII,,, | Performed by: NURSE PRACTITIONER

## 2023-04-12 PROCEDURE — 20610 DRAIN/INJ JOINT/BURSA W/O US: CPT | Mod: LT,,, | Performed by: NURSE PRACTITIONER

## 2023-04-12 PROCEDURE — 20610 LARGE JOINT ASPIRATION/INJECTION: L KNEE: ICD-10-PCS | Mod: LT,,, | Performed by: NURSE PRACTITIONER

## 2023-04-12 PROCEDURE — 3008F BODY MASS INDEX DOCD: CPT | Mod: CPTII,,, | Performed by: NURSE PRACTITIONER

## 2023-04-12 PROCEDURE — 3079F DIAST BP 80-89 MM HG: CPT | Mod: CPTII,,, | Performed by: NURSE PRACTITIONER

## 2023-04-12 PROCEDURE — 3074F PR MOST RECENT SYSTOLIC BLOOD PRESSURE < 130 MM HG: ICD-10-PCS | Mod: CPTII,,, | Performed by: NURSE PRACTITIONER

## 2023-04-12 PROCEDURE — 73564 X-RAY EXAM KNEE 4 OR MORE: CPT | Mod: LT,,, | Performed by: ORTHOPAEDIC SURGERY

## 2023-04-12 PROCEDURE — 1159F PR MEDICATION LIST DOCUMENTED IN MEDICAL RECORD: ICD-10-PCS | Mod: CPTII,,, | Performed by: NURSE PRACTITIONER

## 2023-04-12 PROCEDURE — 3074F SYST BP LT 130 MM HG: CPT | Mod: CPTII,,, | Performed by: NURSE PRACTITIONER

## 2023-04-12 PROCEDURE — 99213 PR OFFICE/OUTPT VISIT, EST, LEVL III, 20-29 MIN: ICD-10-PCS | Mod: 25,,, | Performed by: NURSE PRACTITIONER

## 2023-04-12 RX ORDER — BETAMETHASONE SODIUM PHOSPHATE AND BETAMETHASONE ACETATE 3; 3 MG/ML; MG/ML
6 INJECTION, SUSPENSION INTRA-ARTICULAR; INTRALESIONAL; INTRAMUSCULAR; SOFT TISSUE
Status: DISCONTINUED | OUTPATIENT
Start: 2023-04-12 | End: 2023-04-12 | Stop reason: HOSPADM

## 2023-04-12 RX ORDER — LIDOCAINE HYDROCHLORIDE 20 MG/ML
3 INJECTION, SOLUTION EPIDURAL; INFILTRATION; INTRACAUDAL; PERINEURAL
Status: DISCONTINUED | OUTPATIENT
Start: 2023-04-12 | End: 2023-04-12 | Stop reason: HOSPADM

## 2023-04-12 RX ADMIN — LIDOCAINE HYDROCHLORIDE 3 ML: 20 INJECTION, SOLUTION EPIDURAL; INFILTRATION; INTRACAUDAL; PERINEURAL at 10:04

## 2023-04-12 RX ADMIN — BETAMETHASONE SODIUM PHOSPHATE AND BETAMETHASONE ACETATE 6 MG: 3; 3 INJECTION, SUSPENSION INTRA-ARTICULAR; INTRALESIONAL; INTRAMUSCULAR; SOFT TISSUE at 10:04

## 2023-04-12 NOTE — PROGRESS NOTES
Chief Complaint:   Chief Complaint   Patient presents with    Left Knee - Pain     Visit for left knee pain. Pain has been going on for about 3 months. has been taking Tramadol and Aleve for pain. Pain is a 2/10 today 8/10 at its worse.         Consulting Physician: No ref. provider found    History of present illness:    she  is a pleasant 53 y.o. year old female with left knee pain since the beginning of April 2023. She was deep cleaning her house for 2 days and noticed pain afterwards that has persisted. It is worse with activity. It is somewhat better with rest. She denies a specific injury or pop. She cannot take antiinflammatories.    Past Medical History:   Diagnosis Date    ADD (attention deficit disorder)     Anxiety     and depression    Hypertension     Screening for malignant neoplasm of colon        Past Surgical History:   Procedure Laterality Date    KNEE ARTHROSCOPY      OPEN REDUCTION AND INTERNAL FIXATION (ORIF) OF INJURY OF ANKLE Right 06/23/2022    Procedure: ORIF, ANKLE-will start prone-Synthes;  Surgeon: Ezekiel Emery Jr., MD;  Location: [facility];  Service: Orthopedics;  Laterality: Right;    TUBAL LIGATION         Current Outpatient Medications   Medication Sig    amlodipine-valsartan (EXFORGE) 5-160 mg per tablet Take 1 tablet by mouth once daily.    FLUoxetine 40 MG capsule 40 mg Daily.    traMADoL (ULTRAM) 50 mg tablet Take 1 tablet (50 mg total) by mouth every 8 (eight) hours as needed for Pain.    TRANEXAMIC ACID, BULK, MISC 650 mg by Misc.(Non-Drug; Combo Route) route. PRN ONLY for menstrual cycle    vitamin D (VITAMIN D3) 1000 units Tab Take 1,000 Units by mouth once daily.    VYVANSE 40 mg Cap Take 40 mg by mouth every morning.    albuterol (PROAIR HFA) 90 mcg/actuation inhaler Inhale 2 puffs into the lungs every 6 (six) hours as needed for Wheezing or Shortness of Breath. Rescue     No current facility-administered medications for this visit.       Review of patient's allergies  "indicates:  No Known Allergies    Family History   Problem Relation Age of Onset    Diabetes Mother     Hypertension Father        Social History     Socioeconomic History    Marital status: Significant Other   Tobacco Use    Smoking status: Former     Types: Cigarettes    Smokeless tobacco: Current   Substance and Sexual Activity    Alcohol use: Yes     Alcohol/week: 1.0 standard drink     Types: 1 Glasses of wine per week     Comment: occas    Drug use: Never    Sexual activity: Yes     Partners: Male     Birth control/protection: See Surgical Hx       Review of Systems:    Constitution:   Denies chills, fever, and sweats.  HENT:   Denies headaches or blurry vision.  Cardiovascular:  Denies chest pain or irregular heart beat.  Respiratory:   Denies cough or shortness of breath.  Gastrointestinal:  Denies abdominal pain, nausea, or vomiting.  Musculoskeletal:   Denies muscle cramps.  Neurological:   Denies dizziness or focal weakness.  Psychiatric/Behavior: Normal mental status.  Hematology/Lymph:  Denies bleeding problem or easy bruising/bleeding.  Skin:    Denies rash or suspicious lesions.    Examination:    Vital Signs:    Vitals:    04/12/23 1053 04/12/23 1101   BP: 120/83    Pulse: 86    Temp: 98.1 °F (36.7 °C)    SpO2: 96%    Weight: 106.1 kg (234 lb)    Height: 5' 5" (1.651 m)    PainSc:    2       Body mass index is 38.94 kg/m².    Constitution:   Well-developed, well nourished patient in no acute distress.  Neurological:   Alert and oriented x 3 and cooperative to examination.     Psychiatric/Behavior: Normal mental status.  Respiratory:   No shortness of breath.  Eyes:    Extraoccular muscles intact  Skin:    No scars, rash or suspicious lesions.    MSK:   Standing exam  stance: normal alignment, no significant leg-length discrepancy  gait: antalgic limp    Knee examination  - General comments: unremarkable appearance    - Tenderness: none to palpation     Knee                  RIGHT    LEFT  Skin:       "            Intact      Intact  ROM:                 0-130      0-130  Effusion:             Neg        Neg  MJL TTP:           Neg         Neg  LJL TTP:            Neg         Neg  Amara:         Neg         Neg  Pat crep:            Neg           +  Patella TTPs:     Neg         Neg  Patella grind:      Neg        Neg  Lachman:           Neg        Neg  Pivot shift:          Neg        Neg  Valgus stress:    Neg        Neg  Varus stress:      Neg        Neg  Posterior drawer: Neg       Neg    N-V intact intact  Hip: nml nml    Lower extremity edema:Negative       Imaging: X-rays ordered and images interpreted today personally by me of 4 views of the left knee show osteoarthritis       Assessment: Chronic pain of left knee  -     X-Ray Knee Complete 4 or More Views Left; Future; Expected date: 04/12/2023    Primary osteoarthritis of left knee        Plan:  We discussed arthritis treatment options. We will try an injection today. She can follow up as needed.

## 2023-04-12 NOTE — PROCEDURES
Large Joint Aspiration/Injection: L knee    Date/Time: 4/12/2023 10:15 AM  Performed by: IRENE Verdin  Authorized by: IRENE Verdin     Consent Done?:  Yes (Verbal)  Indications:  Pain  Site marked: the procedure site was marked    Timeout: prior to procedure the correct patient, procedure, and site was verified    Prep: patient was prepped and draped in usual sterile fashion      Local anesthesia used?: Yes    Local anesthetic:  Topical anesthetic    Details:  Needle Size:  21 G  Ultrasonic Guidance for needle placement?: No    Approach:  Anterolateral  Location:  Knee  Site:  L knee  Medications:  3 mL LIDOcaine (PF) 20 mg/mL (2%) 20 mg/mL (2 %); 6 mg betamethasone acetate-betamethasone sodium phosphate 6 mg/mL  Patient tolerance:  Patient tolerated the procedure well with no immediate complications   Vaccine Information Statement(s) was given today. This has been reviewed, questions answered, and verbal consent given by Patient for injection(s) and administration of Tetanus/Diphtheria/Pertussis (Tdap).      Patient tolerated without incident. See immunization grid for documentation.

## 2023-05-30 ENCOUNTER — OFFICE VISIT (OUTPATIENT)
Dept: URGENT CARE | Facility: CLINIC | Age: 53
End: 2023-05-30
Payer: COMMERCIAL

## 2023-05-30 VITALS
OXYGEN SATURATION: 98 % | SYSTOLIC BLOOD PRESSURE: 120 MMHG | TEMPERATURE: 98 F | BODY MASS INDEX: 38.99 KG/M2 | RESPIRATION RATE: 16 BRPM | HEART RATE: 88 BPM | DIASTOLIC BLOOD PRESSURE: 83 MMHG | WEIGHT: 234 LBS | HEIGHT: 65 IN

## 2023-05-30 DIAGNOSIS — T21.13XA: Primary | ICD-10-CM

## 2023-05-30 PROCEDURE — 99213 PR OFFICE/OUTPT VISIT, EST, LEVL III, 20-29 MIN: ICD-10-PCS | Mod: S$PBB,,, | Performed by: FAMILY MEDICINE

## 2023-05-30 PROCEDURE — 99213 OFFICE O/P EST LOW 20 MIN: CPT | Mod: S$PBB,,, | Performed by: FAMILY MEDICINE

## 2023-05-30 RX ORDER — GENTAMICIN SULFATE 1 MG/G
OINTMENT TOPICAL 3 TIMES DAILY
Qty: 15 G | Refills: 0 | Status: SHIPPED | OUTPATIENT
Start: 2023-05-30 | End: 2023-06-09

## 2023-05-30 NOTE — PROGRESS NOTES
"Subjective:      Patient ID: Jadyn Wilder is a 53 y.o. female.    Vitals:  height is 5' 5" (1.651 m) and weight is 106.1 kg (234 lb). Her temperature is 98 °F (36.7 °C). Her blood pressure is 120/83 and her pulse is 88. Her respiration is 16 and oxygen saturation is 98%.     Chief Complaint: Other Misc (Burn on back x today- went to chiropractor and they put leads on her and felt a burning sensation )    54yo F presents to the [facility] for evaluation of a burn on her back. Patient reports that she was seen at a chiropractor this morning, she reports he recommended that she have a treatment with a machine that was similar to a Tens unit, but more intense. After about 10 mins on the machine patient reports she began to feel burning on the skin. When she got home she saw blisters on her back.      Constitution: Negative for chills and fever.   Neck: Negative for neck stiffness and neck swelling.   Cardiovascular:  Negative for chest pain.   Respiratory:  Negative for shortness of breath.    Gastrointestinal:  Negative for nausea and vomiting.   Skin:  Positive for wound.    Objective:     Physical Exam   HENT:   Head: Normocephalic and atraumatic.   Mouth/Throat: Mucous membranes are moist.   Eyes: Pupils are equal, round, and reactive to light. Extraocular movement intact   Neck: No neck rigidity present.   Cardiovascular: Normal rate, regular rhythm, normal heart sounds and normal pulses.   Pulmonary/Chest: Effort normal and breath sounds normal.   Abdominal: Normal appearance.   Neurological: She is alert.   Skin:         Comments: L scapular region on back with irregularly shaped wound with surrounding erythema, tenderness to palpation, no drainage, yellow tissue in center of wound         Assessment:     1. Superficial burn of upper back, initial encounter        Plan:       Superficial burn of upper back, initial encounter  -     gentamicin (GARAMYCIN) 0.1 % ointment; Apply topically 3 (three) times daily. " for 10 days  Dispense: 15 g; Refill: 0          Medical Decision Making:   Initial Assessment:   52yo F presents for evaluation of wound on back xA few hours. No acute distress, ojn exam lesion erythematous with tenderness, no drainge, no skin peeling, no blisters,   Differential Diagnosis:   Superficial Skin Burn  Urgent Care Management:  Prescribed gentamicin ointment, advised patient to keep wound clean, use cold compress, apply ointment 3x daily. Return precautions provided.

## 2023-06-06 ENCOUNTER — PATIENT MESSAGE (OUTPATIENT)
Dept: ADMINISTRATIVE | Facility: OTHER | Age: 53
End: 2023-06-06
Payer: COMMERCIAL

## 2023-06-06 PROBLEM — I10 PRIMARY HYPERTENSION: Status: ACTIVE | Noted: 2023-06-06

## 2023-06-08 ENCOUNTER — PATIENT MESSAGE (OUTPATIENT)
Dept: ORTHOPEDICS | Facility: CLINIC | Age: 53
End: 2023-06-08
Payer: COMMERCIAL

## 2023-06-09 DIAGNOSIS — M17.12 PRIMARY OSTEOARTHRITIS OF LEFT KNEE: Primary | ICD-10-CM

## 2023-06-16 ENCOUNTER — OFFICE VISIT (OUTPATIENT)
Dept: URGENT CARE | Facility: CLINIC | Age: 53
End: 2023-06-16
Payer: COMMERCIAL

## 2023-06-16 VITALS
OXYGEN SATURATION: 98 % | SYSTOLIC BLOOD PRESSURE: 111 MMHG | BODY MASS INDEX: 38.65 KG/M2 | HEIGHT: 65 IN | TEMPERATURE: 98 F | HEART RATE: 92 BPM | WEIGHT: 232 LBS | DIASTOLIC BLOOD PRESSURE: 75 MMHG | RESPIRATION RATE: 18 BRPM

## 2023-06-16 DIAGNOSIS — M79.646 PAIN OF FINGER, UNSPECIFIED LATERALITY: Primary | ICD-10-CM

## 2023-06-16 PROCEDURE — 99213 PR OFFICE/OUTPT VISIT, EST, LEVL III, 20-29 MIN: ICD-10-PCS | Mod: ,,,

## 2023-06-16 PROCEDURE — 99213 OFFICE O/P EST LOW 20 MIN: CPT | Mod: ,,,

## 2023-06-16 RX ORDER — TRAMADOL HYDROCHLORIDE 50 MG/1
50 TABLET ORAL EVERY 6 HOURS PRN
Qty: 8 TABLET | Refills: 0 | Status: SHIPPED | OUTPATIENT
Start: 2023-06-16 | End: 2023-06-18

## 2023-06-16 RX ORDER — LISDEXAMFETAMINE DIMESYLATE 50 MG/1
50 CAPSULE ORAL
COMMUNITY
Start: 2023-06-06 | End: 2023-07-11 | Stop reason: SDUPTHER

## 2023-06-16 RX ORDER — PROPRANOLOL HYDROCHLORIDE 10 MG/1
10 TABLET ORAL DAILY PRN
COMMUNITY
Start: 2023-04-04 | End: 2023-07-11 | Stop reason: SDUPTHER

## 2023-06-16 RX ORDER — MELOXICAM 15 MG/1
15 TABLET ORAL
COMMUNITY
Start: 2023-04-03 | End: 2023-09-11

## 2023-06-16 RX ORDER — TRANEXAMIC ACID 650 MG/1
1300 TABLET ORAL 3 TIMES DAILY
COMMUNITY
Start: 2023-01-04 | End: 2023-07-11

## 2023-06-16 RX ORDER — PANTOPRAZOLE SODIUM 40 MG/1
40 TABLET, DELAYED RELEASE ORAL 2 TIMES DAILY
COMMUNITY
Start: 2023-06-01 | End: 2023-07-11 | Stop reason: SDUPTHER

## 2023-06-16 NOTE — PROGRESS NOTES
"Subjective:      Patient ID: Jadyn Wilder is a 53 y.o. female.    Vitals:  height is 5' 5" (1.651 m) and weight is 105.2 kg (232 lb). Her oral temperature is 98.2 °F (36.8 °C). Her blood pressure is 111/75 and her pulse is 92. Her respiration is 18 and oxygen saturation is 98%.     Chief Complaint: Finger Injury (Pt injured finger moving dryer x today around 40 minutes ago.)    A 54 y/o female presents to the clinic with c/o right fifth finger pain after injuring it moving a dryer today approximately 40 minutes pta. Patient denies any bleeding or laceration to the finger. She is able to move the finger with limited mobility. She denies any sensation changes, numbness or tingling, or temperature changes.       Constitution: Negative.   HENT: Negative.     Eyes: Negative.    Respiratory: Negative.     Gastrointestinal: Negative.    Genitourinary: Negative.    Musculoskeletal:  Positive for pain, trauma and joint pain.    Objective:     Physical Exam   Constitutional: She is oriented to person, place, and time. She appears well-developed. She is cooperative.  Non-toxic appearance. She does not appear ill. No distress.   HENT:   Head: Normocephalic and atraumatic.   Ears:   Right Ear: Hearing and external ear normal.   Left Ear: Hearing and external ear normal.   Mouth/Throat: Oropharynx is clear and moist and mucous membranes are normal.   Eyes: Conjunctivae and lids are normal.   Neck: Trachea normal and phonation normal. Neck supple. No edema present. No erythema present. No neck rigidity present.   Cardiovascular: Normal rate.   Pulmonary/Chest: Effort normal. She has no decreased breath sounds.   Abdominal: Normal appearance.   Musculoskeletal:      Right hand: She exhibits decreased range of motion, tenderness (tendernes below the nail bed of the 5th right digit) and swelling (right 5th digit under the nail bed there is swelling and mild ecchymosis). She exhibits normal capillary refill and no laceration. Normal " sensation noted.      Left hand: Normal.      Comments: The 5th digit on the right hand is warm, capillary refill <2 seconds, minimal swelling and some ecchymosis noted    Neurological: She is alert and oriented to person, place, and time. She exhibits normal muscle tone. Coordination normal.   Skin: Skin is warm, dry, intact, not diaphoretic and no rash. Capillary refill takes less than 2 seconds. bruising   Psychiatric: Her speech is normal and behavior is normal. Mood normal.   Nursing note and vitals reviewed.    Concern for the development of a subungual hematoma under the nail on the right 5th digit. Patient has gel nails currently but they are clear and there is no bruising appreciated under the nail at this time The patient was advised to soak off the gel off the nail tonight and return to have it drained if there is purple color and pressure under the nail in the next few days.   Assessment:     1. Pain of finger, unspecified laterality        Plan:       Pain of finger, unspecified laterality  -     X-Ray Finger 2 or More Views Right    Other orders  -     traMADoL (ULTRAM) 50 mg tablet; Take 1 tablet (50 mg total) by mouth every 6 (six) hours as needed for Pain.  Dispense: 8 tablet; Refill: 0    Xray result: Possible nondisplaced fracture at the distal phalanx seen only on the frontal view.  Alternatively this could be related to vascular channel.     Tylenol or motrin as needed for pain     Finger splint provided and applied by the clinic.     Loosen the splint if needed. Elevate extremity above the level of the heart while resting to avoid excessive swelling. Ice the area 2-3 times/day for 10 minutes at a time; do not place directly on the skin.      Get plenty of rest.     If pain persist or worsens in the next 7-10 days return to clinic for repeat xrays or we can refer you to ortho     Go to the Emergency Department with any significant change or worsening symptoms.

## 2023-06-17 NOTE — PATIENT INSTRUCTIONS
Preliminary xray concern for possible fracture; will call with official xray results tomorrow      Tylenol or motrin as needed for pain     Finger splint provided and applied by the clinic.     Loosen the splint if needed. Elevate extremity above the level of the heart while resting to avoid excessive swelling. Ice the area 2-3 times/day for 10 minutes at a time; do not place directly on the skin.      Get plenty of rest.     If pain persist or worsens in the next 7-10 days return to clinic for repeat xrays or we can refer you to ortho     Go to the Emergency Department with any significant change or worsening     Soak off the gel off the nail tonight and return to have it drained if there is purple color and pressure under the nail in the next few days

## 2023-06-18 ENCOUNTER — OFFICE VISIT (OUTPATIENT)
Dept: URGENT CARE | Facility: CLINIC | Age: 53
End: 2023-06-18
Payer: COMMERCIAL

## 2023-06-18 VITALS
RESPIRATION RATE: 18 BRPM | SYSTOLIC BLOOD PRESSURE: 129 MMHG | DIASTOLIC BLOOD PRESSURE: 88 MMHG | OXYGEN SATURATION: 98 % | WEIGHT: 232 LBS | HEART RATE: 89 BPM | BODY MASS INDEX: 38.65 KG/M2 | TEMPERATURE: 99 F | HEIGHT: 65 IN

## 2023-06-18 DIAGNOSIS — Z89.021: ICD-10-CM

## 2023-06-18 DIAGNOSIS — M79.644 FINGER PAIN, RIGHT: ICD-10-CM

## 2023-06-18 PROCEDURE — 99213 OFFICE O/P EST LOW 20 MIN: CPT | Mod: ,,, | Performed by: NURSE PRACTITIONER

## 2023-06-18 PROCEDURE — 99213 PR OFFICE/OUTPT VISIT, EST, LEVL III, 20-29 MIN: ICD-10-PCS | Mod: ,,, | Performed by: NURSE PRACTITIONER

## 2023-06-18 RX ORDER — DICLOFENAC SODIUM 75 MG/1
75 TABLET, DELAYED RELEASE ORAL 2 TIMES DAILY PRN
Qty: 20 TABLET | Refills: 0 | Status: SHIPPED | OUTPATIENT
Start: 2023-06-18 | End: 2023-06-28

## 2023-06-18 NOTE — PROGRESS NOTES
"Subjective:      Patient ID: Jadyn Wilder is a 53 y.o. female.    Vitals:  height is 5' 5" (1.651 m) and weight is 105.2 kg (232 lb). Her temperature is 98.6 °F (37 °C). Her blood pressure is 129/88 and her pulse is 89. Her respiration is 18 and oxygen saturation is 98%.     Chief Complaint: Other (Smashed right 5th finger in a dryer door on Friday. Went to urgent care, x-rays done (likely fractured) and splinted it. She was told to return to urgent care if pressure did not improve/worsened to get it drained. )    HPI    Skin:  Positive for bruising (right distal finger).    Objective:     Physical Exam   Abdominal: Normal appearance.   Musculoskeletal:         General: Swelling (right distal finger) present.      Right hand: Comments: Discoloration, contusion to the distal right finger tip, good range of motion.  Minimal swelling        Hands:    Neurological: She is alert.   Skin: Skin is warm and dry.   Nursing note and vitals reviewed.    I removed the finger splint which was wrapped tightly.  Swelling with contusion to right distal 5th finger.  Good range of motion.    Assessment:     1. right finger    2. Finger pain, right    5th right finger distally    Plan:   Follow-up with your orthopedic physician Dr. Emery tomorrow morning as planned  Take prescription diclofenac as directed  Keep finger splint in place  Go to ER for worsening pain, swelling, or for concerns as instructed    right finger    Finger pain, right                      "

## 2023-06-18 NOTE — PATIENT INSTRUCTIONS
Follow-up with your orthopedic physician Dr. Emery tomorrow morning as planned  Take prescription diclofenac as directed  Keep finger splint in place  Go to ER for worsening pain, swelling, or for concerns as instructed

## 2023-06-19 ENCOUNTER — TELEPHONE (OUTPATIENT)
Dept: ORTHOPEDICS | Facility: CLINIC | Age: 53
End: 2023-06-19
Payer: COMMERCIAL

## 2023-06-19 ENCOUNTER — PATIENT MESSAGE (OUTPATIENT)
Dept: ORTHOPEDICS | Facility: CLINIC | Age: 53
End: 2023-06-19
Payer: COMMERCIAL

## 2023-06-19 NOTE — TELEPHONE ENCOUNTER
Patient called smashed finger over the weekend. LATOYA told it was possibly fractured and to follow up with Ortho. She is asking if you can look at the x-rays and advise if she should come in or not.

## 2023-06-19 NOTE — TELEPHONE ENCOUNTER
She smashed it over the nail?it looks like a nondisplaced fracture the distal phalanx.  Should heal up uneventfully.  Happy to see her if she wants to come back

## 2023-06-26 ENCOUNTER — OFFICE VISIT (OUTPATIENT)
Dept: ORTHOPEDICS | Facility: CLINIC | Age: 53
End: 2023-06-26
Payer: COMMERCIAL

## 2023-06-26 VITALS — WEIGHT: 232 LBS | BODY MASS INDEX: 38.65 KG/M2 | HEIGHT: 65 IN

## 2023-06-26 DIAGNOSIS — M17.12 PRIMARY OSTEOARTHRITIS OF LEFT KNEE: Primary | ICD-10-CM

## 2023-06-26 PROCEDURE — 99499 NO LOS: ICD-10-PCS | Mod: ,,, | Performed by: NURSE PRACTITIONER

## 2023-06-26 PROCEDURE — 20610 LARGE JOINT ASPIRATION/INJECTION: L KNEE: ICD-10-PCS | Mod: LT,,, | Performed by: NURSE PRACTITIONER

## 2023-06-26 PROCEDURE — 20610 DRAIN/INJ JOINT/BURSA W/O US: CPT | Mod: LT,,, | Performed by: NURSE PRACTITIONER

## 2023-06-26 PROCEDURE — 99499 UNLISTED E&M SERVICE: CPT | Mod: ,,, | Performed by: NURSE PRACTITIONER

## 2023-06-26 NOTE — PROCEDURES
Large Joint Aspiration/Injection: L knee    Date/Time: 6/26/2023 9:45 AM  Performed by: IRENE Verdin  Authorized by: IRENE Verdin     Consent Done?:  Yes (Verbal)  Indications:  Arthritis and pain  Site marked: the procedure site was marked    Timeout: prior to procedure the correct patient, procedure, and site was verified    Prep: patient was prepped and draped in usual sterile fashion      Local anesthesia used?: Yes    Local anesthetic:  Topical anesthetic and lidocaine 1% without epinephrine    Details:  Needle Size:  18 G  Approach:  Superior  Location:  Knee  Site:  L knee  Medications:  48 mg hylan g-f 20 48 mg/6 mL  Patient tolerance:  Patient tolerated the procedure well with no immediate complications

## 2023-07-10 PROBLEM — Z76.89 ENCOUNTER TO ESTABLISH CARE: Status: ACTIVE | Noted: 2022-10-31

## 2023-07-10 NOTE — PROGRESS NOTES
Date: 07/11/2023  Patient ID: [id number]   Chief Complaint: Establish Care (New patient to establish care)    HPI:   Jadyn Wilder is a 53 y.o. female who is here today to establish care.Also f/u with ortho for L knee hyaluronic acid injections, which have helped with symptoms. Patient also part of digital medicine and takes BP at home and takes meds below. Patient would like referral for weight loss surgery and cosmetic surgery.    LMP 1/2023. Undergoing menopausal symptoms but does not need medication at this time.    Past Medical History:   Diagnosis Date    ADD (attention deficit disorder)     Anxiety     and depression    Closed trimalleolar fracture of right ankle with routine healing 6/22/2022    Finger pain, right 6/18/2023    Hypertension     right finger 6/18/2023    Screening for malignant neoplasm of colon     Segmental and somatic dysfunction     Spondylosis of cervical region without myelopathy or radiculopathy     Vitamin D deficiency 7/11/2023     Past Surgical History:   Procedure Laterality Date    KNEE ARTHROSCOPY      OPEN REDUCTION AND INTERNAL FIXATION (ORIF) OF INJURY OF ANKLE Right 06/23/2022    Procedure: ORIF, ANKLE-will start prone-Synthes;  Surgeon: Ezekiel Emery Jr., MD;  Location: [facility];  Service: Orthopedics;  Laterality: Right;    TUBAL LIGATION       Review of patient's allergies indicates:  No Known Allergies  Outpatient Medications Marked as Taking for the 7/11/23 encounter (Office Visit) with Jazmín Guerra MD   Medication Sig Dispense Refill    meloxicam (MOBIC) 15 MG tablet Take 15 mg by mouth.      [DISCONTINUED] amlodipine-valsartan (EXFORGE) 5-160 mg per tablet Take 1 tablet by mouth once daily. 90 tablet 3    [DISCONTINUED] FLUoxetine 40 MG capsule 40 mg Daily.      [DISCONTINUED] pantoprazole (PROTONIX) 40 MG tablet Take 40 mg by mouth 2 (two) times daily.      [DISCONTINUED] propranoloL (INDERAL) 10 MG tablet Take 10 mg by mouth daily as needed.      [DISCONTINUED]  VYVANSE 50 mg capsule Take 50 mg by mouth.       Family History   Problem Relation Age of Onset    Diabetes Mother     Hypertension Father     Skin cancer Father     No Known Problems Sister     No Known Problems Brother     Colon cancer Maternal Grandmother     Pancreatic cancer Maternal Grandfather       Social History     Socioeconomic History    Marital status: Significant Other   Occupational History    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   Tobacco Use    Smoking status: Former     Types: Cigarettes     Passive exposure: Never    Smokeless tobacco: Never    Tobacco comments:     Quit >15yrs ago   Substance and Sexual Activity    Alcohol use: Not Currently     Alcohol/week: 1.0 standard drink     Types: 1 Glasses of wine per week    Drug use: Never    Sexual activity: Yes     Partners: Male     Birth control/protection: See Surgical Hx     Comment: valerie   Social History Narrative    Has granddaughter 5mos olf     Patient Care Team:  Jazmín Guerra MD as PCP - General (Family Medicine)  Nida Waldrop MD as Hypertension Digital Medicine Responsible Provider (Family Medicine)  Robin Montiel as Digital Medicine Health   Hima BadilloD as Hypertension Digital Medicine Clinician (Pharmacist)  [employer] - [employer] as Hypertension Digital Medicine Contract   Subjective:   Review of Systems   HENT:  Negative for hearing loss.    Eyes:  Negative for discharge.   Respiratory:  Negative for wheezing.    Cardiovascular:  Negative for chest pain and palpitations.   Gastrointestinal:  Negative for blood in stool, constipation, diarrhea and vomiting.   Genitourinary:  Negative for dysuria and hematuria.   Musculoskeletal:  Negative for neck pain.   Neurological:  Negative for weakness and headaches.   Endo/Heme/Allergies:  Negative for polydipsia.   See HPI for details  All Other ROS: Negative except as stated in HPI  Answers submitted by the patient for this visit:  Review of Systems Questionnaire  "(Submitted on 7/10/2023)  activity change: No  unexpected weight change: No  rhinorrhea: No  trouble swallowing: No  visual disturbance: No  chest tightness: No  polyuria: No  difficulty urinating: No  menstrual problem: No  joint swelling: No  arthralgias: No  confusion: No  dysphoric mood: No  Objective:   /71   Pulse 88   Ht 5' 5" (1.651 m)   Wt 106 kg (233 lb 11.2 oz)   SpO2 (!) 94%   BMI 38.89 kg/m²   Physical Exam  Cardiovascular:      Rate and Rhythm: Normal rate and regular rhythm.      Heart sounds: Normal heart sounds.   Pulmonary:      Effort: Pulmonary effort is normal.      Breath sounds: Normal breath sounds.   Abdominal:      General: Abdomen is flat. Bowel sounds are normal.      Palpations: Abdomen is soft. There is no mass.      Tenderness: There is no abdominal tenderness. There is no guarding or rebound.     Assessment:       ICD-10-CM ICD-9-CM   1. Encounter to establish care  Z76.89 V65.8   2. Pure hypertriglyceridemia  E78.1 272.1   3. Anxiety and depression  F41.9 300.00    F32.A 311   4. Attention deficit disorder, unspecified hyperactivity presence  F98.8 314.00   5. Spondylosis of cervical region without myelopathy or radiculopathy  M47.812 721.0   6. Menopausal syndrome  N95.1 627.2   7. Vitamin D deficiency  E55.9 268.9   8. Class 2 obesity with body mass index (BMI) of 38.0 to 38.9 in adult, unspecified obesity type, unspecified whether serious comorbidity present  E66.9 278.00    Z68.38 V85.38   9. Primary hypertension  I10 401.9   10. Gastroesophageal reflux disease, unspecified whether esophagitis present  K21.9 530.81      Plan:   1. Encounter to establish care  Overview:  Obtain routine labs  F/u for wellness      Orders:  -     CBC Auto Differential; Future; Expected date: 07/11/2023  -     Comprehensive Metabolic Panel; Future; Expected date: 07/11/2023  -     Lipid Panel; Future; Expected date: 07/11/2023  -     TSH; Future; Expected date: 07/11/2023  -     Hemoglobin " A1C; Future; Expected date: 07/11/2023  -     Urinalysis; Future; Expected date: 07/11/2023  -     T4, Free; Future; Expected date: 07/11/2023  -     Vitamin D; Future; Expected date: 07/11/2023  -     Hepatitis C Antibody; Future; Expected date: 07/11/2023  -     HIV 1/2 Ag/Ab (4th Gen); Future; Expected date: 07/11/2023    2. Pure hypertriglyceridemia  Overview:  Encourage weight loss by least 5% and to increase aerobic exercise and resistance training  Limit simple sugars and simple carbohydrate intake-focus on low glycemic foods  Optimize blood sugar control  The ASCVD Risk score (Alessandro ARIZMENDI, et al., 2019) failed to calculate for the following reasons:    Cannot find a previous HDL lab    Cannot find a previous total cholesterol lab             Orders:  -     Ambulatory referral/consult to Bariatric Surgery; Future; Expected date: 07/18/2023    3. Anxiety and depression  Overview:  Stable  Continue current Rx  Practice deep breathing or abdominal breathing exercises when anxiety occurs.  Exercise daily. Get sunlight daily.  Avoid caffeine, alcohol and stimulants.  Practice positive phrases and repeat throughout the day, along with yoga and relaxation techniques.  Set healthy boundaries, avoid people and conversations that increase stress.  Report any symptoms of suicidal or homicidal ideations immediately, if clinic is closed go to nearest emergency room.          Orders:  -     FLUoxetine 40 MG capsule; Take 1 capsule (40 mg total) by mouth once daily.  Dispense: 910 capsule; Refill: 3  -     propranoloL (INDERAL) 10 MG tablet; Take 1 tablet (10 mg total) by mouth daily as needed.  Dispense: 30 tablet; Refill: 11    4. Attention deficit disorder, unspecified hyperactivity presence  Overview:  Stable. Continue current medications.  Controlled substance agreement signed today, obtain UDS  /narx care checked without discrepancies noted  1-month medication refills given  Quarterly visits required for continued  prescriptions with minimum annual wellness exam  Discussed adverse effects that include but not limited to cardiovascular event, stroke, heart attack, death        5. Spondylosis of cervical region without myelopathy or radiculopathy  -     Drug Screen, Urine; Future; Expected date: 07/11/2023  -     VYVANSE 50 mg capsule; Take 1 capsule (50 mg total) by mouth every morning.  Dispense: 30 capsule; Refill: 0    6. Menopausal syndrome  -     Estradiol; Future; Expected date: 07/11/2023  -     Follicle Stimulating Hormone; Future; Expected date: 07/11/2023  -     Luteinizing Hormone; Future; Expected date: 07/11/2023    7. Vitamin D deficiency  Overview:  Recheck level      8. Class 2 obesity with body mass index (BMI) of 38.0 to 38.9 in adult, unspecified obesity type, unspecified whether serious comorbidity present  Overview:  Body mass index is 38.89 kg/m².  Consider bariatric surgery if BMI >40 or >35 with comorbidities. Referral sent     Recommend intensive, multicomponent, behavioral interventions:  Goal BMI <30.  Goal is to exercise at least 5 times a week for 30 minutes per day.   -Stand more each day.   -Increase exercise: Start with 10 minutes daily and build up to 60-90 minutes/day with moderate activity  Reduce caloric intake:  -Women: 2029-0441 kcal/day  -Men: 8853-4199 kcal/day  Avoid soda, simple sugars, excessive rice, potatoes or bread. Limit fast foods and fried foods.  Choose complex carbs in moderation (example: green vegetables, beans, oatmeal). Eat plenty of fresh fruits and vegetables with lean meats daily.  Do not skip meals. Eat a balanced portion size.  Avoid fad diets. Consider long-term healthy life style changes.       Orders:  -     Ambulatory referral/consult to Plastic Surgery; Future; Expected date: 07/18/2023  -     Ambulatory referral/consult to Bariatric Surgery; Future; Expected date: 07/18/2023    9. Primary hypertension  Overview:  Blood pressure goal <140/90 (<130/80 if otherwise  noted)  Continue current medication regimen  Recommend DASH diet  Record BP at home daily and bring log to next office visit, assure that home cuff is calibrated at minimum every 12 months      Orders:  -     Ambulatory referral/consult to Bariatric Surgery; Future; Expected date: 07/18/2023  -     amlodipine-valsartan (EXFORGE) 5-160 mg per tablet; Take 1 tablet by mouth once daily.  Dispense: 90 tablet; Refill: 3    10. Gastroesophageal reflux disease, unspecified whether esophagitis present  -     pantoprazole (PROTONIX) 40 MG tablet; Take 1 tablet (40 mg total) by mouth 2 (two) times daily.  Dispense: 60 tablet; Refill: 2         Medication List with Changes/Refills   Current Medications    MELOXICAM (MOBIC) 15 MG TABLET    Take 15 mg by mouth.       Start Date: 4/3/2023  End Date: --   Changed and/or Refilled Medications    Modified Medication Previous Medication    AMLODIPINE-VALSARTAN (EXFORGE) 5-160 MG PER TABLET amlodipine-valsartan (EXFORGE) 5-160 mg per tablet       Take 1 tablet by mouth once daily.    Take 1 tablet by mouth once daily.       Start Date: 7/11/2023 End Date: --    Start Date: 6/6/2023  End Date: 7/11/2023    FLUOXETINE 40 MG CAPSULE FLUoxetine 40 MG capsule       Take 1 capsule (40 mg total) by mouth once daily.    40 mg Daily.       Start Date: 7/11/2023 End Date: --    Start Date: 6/1/2022  End Date: 7/11/2023    PANTOPRAZOLE (PROTONIX) 40 MG TABLET pantoprazole (PROTONIX) 40 MG tablet       Take 1 tablet (40 mg total) by mouth 2 (two) times daily.    Take 40 mg by mouth 2 (two) times daily.       Start Date: 7/11/2023 End Date: 10/9/2023    Start Date: 6/1/2023  End Date: 7/11/2023    PROPRANOLOL (INDERAL) 10 MG TABLET propranoloL (INDERAL) 10 MG tablet       Take 1 tablet (10 mg total) by mouth daily as needed.    Take 10 mg by mouth daily as needed.       Start Date: 7/11/2023 End Date: --    Start Date: 4/4/2023  End Date: 7/11/2023    VYVANSE 50 MG CAPSULE VYVANSE 50 mg capsule        Take 1 capsule (50 mg total) by mouth every morning.    Take 50 mg by mouth.       Start Date: 7/11/2023 End Date: --    Start Date: 6/6/2023  End Date: 7/11/2023   Discontinued Medications    TRANEXAMIC ACID (LYSTEDA) 650 MG TABLET    Take 1,300 mg by mouth 3 (three) times daily.       Start Date: 1/4/2023  End Date: 7/11/2023    TRANEXAMIC ACID, BULK, MISC    650 mg by Misc.(Non-Drug; Combo Route) route. PRN ONLY for menstrual cycle       Start Date: --        End Date: 7/11/2023    VYVANSE 40 MG CAP    Take 40 mg by mouth every morning.       Start Date: 5/29/2022 End Date: 7/11/2023          Follow up in about 4 weeks (around 8/8/2023) for Wellness. In addition to their scheduled follow up, the patient has also been instructed to follow up on as needed basis.

## 2023-07-11 ENCOUNTER — OFFICE VISIT (OUTPATIENT)
Dept: PRIMARY CARE CLINIC | Facility: CLINIC | Age: 53
End: 2023-07-11
Payer: COMMERCIAL

## 2023-07-11 ENCOUNTER — PATIENT MESSAGE (OUTPATIENT)
Dept: ADMINISTRATIVE | Facility: OTHER | Age: 53
End: 2023-07-11
Payer: COMMERCIAL

## 2023-07-11 VITALS
HEART RATE: 88 BPM | DIASTOLIC BLOOD PRESSURE: 71 MMHG | HEIGHT: 65 IN | BODY MASS INDEX: 38.93 KG/M2 | OXYGEN SATURATION: 94 % | SYSTOLIC BLOOD PRESSURE: 107 MMHG | WEIGHT: 233.69 LBS

## 2023-07-11 DIAGNOSIS — I10 PRIMARY HYPERTENSION: ICD-10-CM

## 2023-07-11 DIAGNOSIS — Z76.89 ENCOUNTER TO ESTABLISH CARE: Primary | ICD-10-CM

## 2023-07-11 DIAGNOSIS — F98.8 ATTENTION DEFICIT DISORDER, UNSPECIFIED HYPERACTIVITY PRESENCE: ICD-10-CM

## 2023-07-11 DIAGNOSIS — E78.1 PURE HYPERTRIGLYCERIDEMIA: ICD-10-CM

## 2023-07-11 DIAGNOSIS — F41.9 ANXIETY AND DEPRESSION: ICD-10-CM

## 2023-07-11 DIAGNOSIS — E66.9 CLASS 2 OBESITY WITH BODY MASS INDEX (BMI) OF 38.0 TO 38.9 IN ADULT, UNSPECIFIED OBESITY TYPE, UNSPECIFIED WHETHER SERIOUS COMORBIDITY PRESENT: ICD-10-CM

## 2023-07-11 DIAGNOSIS — K21.9 GASTROESOPHAGEAL REFLUX DISEASE, UNSPECIFIED WHETHER ESOPHAGITIS PRESENT: ICD-10-CM

## 2023-07-11 DIAGNOSIS — M47.812 SPONDYLOSIS OF CERVICAL REGION WITHOUT MYELOPATHY OR RADICULOPATHY: ICD-10-CM

## 2023-07-11 DIAGNOSIS — F32.A ANXIETY AND DEPRESSION: ICD-10-CM

## 2023-07-11 DIAGNOSIS — E55.9 VITAMIN D DEFICIENCY: ICD-10-CM

## 2023-07-11 DIAGNOSIS — N95.1 MENOPAUSAL SYNDROME: ICD-10-CM

## 2023-07-11 PROBLEM — M79.644 FINGER PAIN, RIGHT: Status: RESOLVED | Noted: 2023-06-18 | Resolved: 2023-07-11

## 2023-07-11 PROBLEM — E66.812 CLASS 2 OBESITY WITH BODY MASS INDEX (BMI) OF 38.0 TO 38.9 IN ADULT: Status: ACTIVE | Noted: 2023-07-11

## 2023-07-11 PROBLEM — Z89.021: Status: RESOLVED | Noted: 2023-06-18 | Resolved: 2023-07-11

## 2023-07-11 PROBLEM — S82.851D CLOSED TRIMALLEOLAR FRACTURE OF RIGHT ANKLE WITH ROUTINE HEALING: Status: RESOLVED | Noted: 2022-06-22 | Resolved: 2023-07-11

## 2023-07-11 PROCEDURE — 1160F PR REVIEW ALL MEDS BY PRESCRIBER/CLIN PHARMACIST DOCUMENTED: ICD-10-PCS | Mod: CPTII,,, | Performed by: STUDENT IN AN ORGANIZED HEALTH CARE EDUCATION/TRAINING PROGRAM

## 2023-07-11 PROCEDURE — 4010F PR ACE/ARB THEARPY RXD/TAKEN: ICD-10-PCS | Mod: CPTII,,, | Performed by: STUDENT IN AN ORGANIZED HEALTH CARE EDUCATION/TRAINING PROGRAM

## 2023-07-11 PROCEDURE — 3008F PR BODY MASS INDEX (BMI) DOCUMENTED: ICD-10-PCS | Mod: CPTII,,, | Performed by: STUDENT IN AN ORGANIZED HEALTH CARE EDUCATION/TRAINING PROGRAM

## 2023-07-11 PROCEDURE — 1160F RVW MEDS BY RX/DR IN RCRD: CPT | Mod: CPTII,,, | Performed by: STUDENT IN AN ORGANIZED HEALTH CARE EDUCATION/TRAINING PROGRAM

## 2023-07-11 PROCEDURE — 99214 OFFICE O/P EST MOD 30 MIN: CPT | Mod: ,,, | Performed by: STUDENT IN AN ORGANIZED HEALTH CARE EDUCATION/TRAINING PROGRAM

## 2023-07-11 PROCEDURE — 1159F PR MEDICATION LIST DOCUMENTED IN MEDICAL RECORD: ICD-10-PCS | Mod: CPTII,,, | Performed by: STUDENT IN AN ORGANIZED HEALTH CARE EDUCATION/TRAINING PROGRAM

## 2023-07-11 PROCEDURE — 4010F ACE/ARB THERAPY RXD/TAKEN: CPT | Mod: CPTII,,, | Performed by: STUDENT IN AN ORGANIZED HEALTH CARE EDUCATION/TRAINING PROGRAM

## 2023-07-11 PROCEDURE — 3074F SYST BP LT 130 MM HG: CPT | Mod: CPTII,,, | Performed by: STUDENT IN AN ORGANIZED HEALTH CARE EDUCATION/TRAINING PROGRAM

## 2023-07-11 PROCEDURE — 3074F PR MOST RECENT SYSTOLIC BLOOD PRESSURE < 130 MM HG: ICD-10-PCS | Mod: CPTII,,, | Performed by: STUDENT IN AN ORGANIZED HEALTH CARE EDUCATION/TRAINING PROGRAM

## 2023-07-11 PROCEDURE — 99214 PR OFFICE/OUTPT VISIT, EST, LEVL IV, 30-39 MIN: ICD-10-PCS | Mod: ,,, | Performed by: STUDENT IN AN ORGANIZED HEALTH CARE EDUCATION/TRAINING PROGRAM

## 2023-07-11 PROCEDURE — 3078F DIAST BP <80 MM HG: CPT | Mod: CPTII,,, | Performed by: STUDENT IN AN ORGANIZED HEALTH CARE EDUCATION/TRAINING PROGRAM

## 2023-07-11 PROCEDURE — 1159F MED LIST DOCD IN RCRD: CPT | Mod: CPTII,,, | Performed by: STUDENT IN AN ORGANIZED HEALTH CARE EDUCATION/TRAINING PROGRAM

## 2023-07-11 PROCEDURE — 3078F PR MOST RECENT DIASTOLIC BLOOD PRESSURE < 80 MM HG: ICD-10-PCS | Mod: CPTII,,, | Performed by: STUDENT IN AN ORGANIZED HEALTH CARE EDUCATION/TRAINING PROGRAM

## 2023-07-11 PROCEDURE — 3008F BODY MASS INDEX DOCD: CPT | Mod: CPTII,,, | Performed by: STUDENT IN AN ORGANIZED HEALTH CARE EDUCATION/TRAINING PROGRAM

## 2023-07-11 RX ORDER — PROPRANOLOL HYDROCHLORIDE 10 MG/1
10 TABLET ORAL DAILY PRN
Qty: 30 TABLET | Refills: 11 | Status: SHIPPED | OUTPATIENT
Start: 2023-07-11

## 2023-07-11 RX ORDER — AMLODIPINE AND VALSARTAN 5; 160 MG/1; MG/1
1 TABLET ORAL DAILY
Qty: 90 TABLET | Refills: 3 | Status: SHIPPED | OUTPATIENT
Start: 2023-07-11

## 2023-07-11 RX ORDER — PANTOPRAZOLE SODIUM 40 MG/1
40 TABLET, DELAYED RELEASE ORAL 2 TIMES DAILY
Qty: 60 TABLET | Refills: 2 | Status: SHIPPED | OUTPATIENT
Start: 2023-07-11 | End: 2024-03-27

## 2023-07-11 RX ORDER — FLUOXETINE HYDROCHLORIDE 40 MG/1
40 CAPSULE ORAL DAILY
Qty: 910 CAPSULE | Refills: 3 | Status: SHIPPED | OUTPATIENT
Start: 2023-07-11 | End: 2024-02-29 | Stop reason: SDUPTHER

## 2023-07-11 RX ORDER — LISDEXAMFETAMINE DIMESYLATE 50 MG/1
50 CAPSULE ORAL EVERY MORNING
Qty: 30 CAPSULE | Refills: 0 | Status: SHIPPED | OUTPATIENT
Start: 2023-07-11 | End: 2023-09-06 | Stop reason: SDUPTHER

## 2023-08-08 ENCOUNTER — DOCUMENTATION ONLY (OUTPATIENT)
Dept: PRIMARY CARE CLINIC | Facility: CLINIC | Age: 53
End: 2023-08-08

## 2023-08-08 PROBLEM — Z00.00 WELLNESS EXAMINATION: Status: ACTIVE | Noted: 2022-10-31

## 2023-08-08 LAB — HEMOGLOBIN A1: 5.7

## 2023-08-08 NOTE — PROGRESS NOTES
Date: 08/10/2023  Patient ID: [id number]   Chief Complaint: Annual Exam (Would like a prescription of bactroban ointment)    HPI:   Jadyn Wilder is a 53 y.o. female here today for an annual wellness visit. Reviewed and discussed lab results. a1c 5.7, cholesterol/LDL/TG elevated. Overall she feels well. LMP 1/2023. Patient would like weight loss medication before bariatric surgeon. Patient did have procedure done with electrode leads, which left a rash on her back and would like bactroban cream. Denies bleeding, swelling, pain.    Diet: udon noodles, veggies, meats, smoothies  Activity level: not as active in heat    Patient Active Problem List   Diagnosis    Wellness examination    Primary hypertension    Pure hypertriglyceridemia    Anxiety and depression    ADD (attention deficit disorder)    Spondylosis of cervical region without myelopathy or radiculopathy    Menopausal syndrome    Vitamin D deficiency    Class 2 obesity with body mass index (BMI) of 38.0 to 38.9 in adult    Prediabetes    Mixed hyperlipidemia     Outpatient Medications Marked as Taking for the 8/10/23 encounter (Office Visit) with Jazmín Guerra MD   Medication Sig Dispense Refill    amlodipine-valsartan (EXFORGE) 5-160 mg per tablet Take 1 tablet by mouth once daily. 90 tablet 3    FLUoxetine 40 MG capsule Take 1 capsule (40 mg total) by mouth once daily. 910 capsule 3    pantoprazole (PROTONIX) 40 MG tablet Take 1 tablet (40 mg total) by mouth 2 (two) times daily. 60 tablet 2    propranoloL (INDERAL) 10 MG tablet Take 1 tablet (10 mg total) by mouth daily as needed. 30 tablet 11    VYVANSE 50 mg capsule Take 1 capsule (50 mg total) by mouth every morning. 30 capsule 0     Past Medical History:   Diagnosis Date    ADD (attention deficit disorder)     Anxiety     and depression    Closed trimalleolar fracture of right ankle with routine healing 6/22/2022    Finger pain, right 6/18/2023    Hypertension     right finger 6/18/2023     Screening for malignant neoplasm of colon     Segmental and somatic dysfunction     Spondylosis of cervical region without myelopathy or radiculopathy     Vitamin D deficiency 7/11/2023     Past Surgical History:   Procedure Laterality Date    KNEE ARTHROSCOPY      OPEN REDUCTION AND INTERNAL FIXATION (ORIF) OF INJURY OF ANKLE Right 06/23/2022    Procedure: ORIF, ANKLE-will start prone-Synthes;  Surgeon: Ezekiel Emery Jr., MD;  Location: [facility];  Service: Orthopedics;  Laterality: Right;    TUBAL LIGATION       Review of patient's allergies indicates:  No Known Allergies  Family History   Problem Relation Age of Onset    Diabetes Mother     Hypertension Father     Skin cancer Father     No Known Problems Sister     No Known Problems Brother     Colon cancer Maternal Grandmother     Pancreatic cancer Maternal Grandfather       Social History     Socioeconomic History    Marital status: Significant Other   Occupational History    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   Tobacco Use    Smoking status: Former     Current packs/day: 0.00     Types: Cigarettes     Passive exposure: Never    Smokeless tobacco: Never    Tobacco comments:     Quit >15yrs ago   Substance and Sexual Activity    Alcohol use: Not Currently     Alcohol/week: 1.0 standard drink of alcohol     Types: 1 Glasses of wine per week    Drug use: Never    Sexual activity: Yes     Partners: Male     Birth control/protection: See Surgical Hx     Comment: valerie   Social History Narrative    Has granddaughter 5mos olf     Patient Care Team:  Jazmín Guerra MD as PCP - General (Family Medicine)  Nida Waldrop MD as Hypertension Digital Medicine Responsible Provider (Family Medicine)  Robin Montiel as Digital Medicine Health   Maryuri Andersen PharmD as Hypertension Digital Medicine Clinician (Pharmacist)  1, [employer] [employer] - [facility] as Hypertension Digital Medicine Contract     Subjective:     Review of Systems   Constitutional: Negative.   Negative for fever and weight loss.   HENT:  Negative for congestion, hearing loss and sore throat.    Eyes:  Negative for blurred vision.   Respiratory:  Negative for cough and shortness of breath.    Cardiovascular:  Negative for chest pain, palpitations and leg swelling.   Gastrointestinal:  Negative for abdominal pain, blood in stool, constipation, diarrhea, nausea and vomiting.   Genitourinary:  Negative for dysuria, frequency, hematuria and urgency.   Musculoskeletal:  Negative for joint pain.   Skin:  Negative for rash.   Neurological:  Negative for weakness and headaches.   Psychiatric/Behavioral:  Negative for depression. The patient is not nervous/anxious and does not have insomnia.        See HPI for details  All Other ROS: Negative except as stated in HPI    Objective:     /75   Pulse 86   Wt 106.5 kg (234 lb 11.2 oz)   SpO2 98%   BMI 39.06 kg/m²     Physical Exam  Vitals reviewed.   Constitutional:       Appearance: Normal appearance.   HENT:      Head: Normocephalic and atraumatic.      Right Ear: Tympanic membrane, ear canal and external ear normal.      Left Ear: Tympanic membrane, ear canal and external ear normal.      Nose: Nose normal. No congestion or rhinorrhea.      Mouth/Throat:      Mouth: Mucous membranes are moist.      Pharynx: Oropharynx is clear.   Eyes:      General: No scleral icterus.     Extraocular Movements: Extraocular movements intact.      Conjunctiva/sclera: Conjunctivae normal.   Cardiovascular:      Rate and Rhythm: Normal rate and regular rhythm.      Pulses: Normal pulses.      Heart sounds: Normal heart sounds.   Pulmonary:      Effort: Pulmonary effort is normal.      Breath sounds: Normal breath sounds.   Abdominal:      General: Abdomen is flat. Bowel sounds are normal.      Palpations: Abdomen is soft.      Tenderness: There is no abdominal tenderness.   Musculoskeletal:         General: No swelling or deformity. Normal range of motion.      Cervical back:  Normal range of motion and neck supple.   Skin:     General: Skin is warm and dry.   Neurological:      Mental Status: She is alert and oriented to person, place, and time.   Psychiatric:         Mood and Affect: Mood normal.         Behavior: Behavior normal.         Thought Content: Thought content normal.         Judgment: Judgment normal.         Assessment:       ICD-10-CM ICD-9-CM   1. Wellness examination  Z00.00 V70.0   2. Prediabetes  R73.03 790.29   3. Mixed hyperlipidemia  E78.2 272.2   4. Class 2 obesity with body mass index (BMI) of 38.0 to 38.9 in adult, unspecified obesity type, unspecified whether serious comorbidity present  E66.9 278.00    Z68.38 V85.38   5. Rash  R21 782.1        Plan:     1. Wellness examination  Overview:  Obtain routine labs  F/u for wellness        2. Prediabetes  Overview:  Prediabetic if A1c 5.7-6.4%, fasting glucose 100-125 mg/dL  Recommend 7% weight loss and exercise 150 minutes per week, decreased caloric intake (consider Mediterranean, DASH, vegetarian diet)  Follow ADA Diet. Avoid soda, simple sweets, and limit rice/pasta/breads/starches (no more than 45-50 grams per meal).  Maintain healthy weight with goal BMI <30.  Exercise at least 5 times per week for 30 minutes per day.          Orders:  -     tirzepatide 2.5 mg/0.5 mL PnIj; Inject 2.5 mg into the skin every 7 days.  Dispense: 4 pen ; Refill: 3    3. Mixed hyperlipidemia  Overview:  Can try omega-3 fatty acids (total,LDL,TG), niacin (TG), CoQ10 (total), Red yeast rice (LDL,TG,inc HDL)  Encourage weight loss by least 5% and to increase aerobic exercise and resistance training  Limit simple sugars and simple carbohydrate intake-focus on low glycemic foods  Optimize blood sugar control  The ASCVD Risk score (Alessandro DK, et al., 2019) failed to calculate for the following reasons:    Cannot find a previous HDL lab    Cannot find a previous total cholesterol lab           Orders:  -     tirzepatide 2.5 mg/0.5 mL PnIj;  Inject 2.5 mg into the skin every 7 days.  Dispense: 4 pen ; Refill: 3    4. Class 2 obesity with body mass index (BMI) of 38.0 to 38.9 in adult, unspecified obesity type, unspecified whether serious comorbidity present  Overview:  Body mass index is 38.89 kg/m².  Consider bariatric surgery if BMI >40 or >35 with comorbidities. Referral sent and appt pending    Start Mounjaro 2.5mg qwk  Recommend intensive, multicomponent, behavioral interventions:  Goal BMI <30.  Goal is to exercise at least 5 times a week for 30 minutes per day.   -Stand more each day.   -Increase exercise: Start with 10 minutes daily and build up to 60-90 minutes/day with moderate activity  Reduce caloric intake:  -Women: 8434-6743 kcal/day  -Men: 3903-0027 kcal/day  Avoid soda, simple sugars, excessive rice, potatoes or bread. Limit fast foods and fried foods.  Choose complex carbs in moderation (example: green vegetables, beans, oatmeal). Eat plenty of fresh fruits and vegetables with lean meats daily.  Do not skip meals. Eat a balanced portion size.  Avoid fad diets. Consider long-term healthy life style changes.       Orders:  -     tirzepatide 2.5 mg/0.5 mL PnIj; Inject 2.5 mg into the skin every 7 days.  Dispense: 4 pen ; Refill: 3    5. Rash  -     mupirocin (BACTROBAN) 2 % ointment; Apply topically 3 (three) times daily.  Dispense: 30 g; Refill: 1         Medication List with Changes/Refills   New Medications    MUPIROCIN (BACTROBAN) 2 % OINTMENT    Apply topically 3 (three) times daily.       Start Date: 8/10/2023 End Date: --    TIRZEPATIDE 2.5 MG/0.5 ML PNIJ    Inject 2.5 mg into the skin every 7 days.       Start Date: 8/10/2023 End Date: --   Current Medications    AMLODIPINE-VALSARTAN (EXFORGE) 5-160 MG PER TABLET    Take 1 tablet by mouth once daily.       Start Date: 7/11/2023 End Date: --    FLUOXETINE 40 MG CAPSULE    Take 1 capsule (40 mg total) by mouth once daily.       Start Date: 7/11/2023 End Date: --    MELOXICAM (MOBIC)  15 MG TABLET    Take 15 mg by mouth.       Start Date: 4/3/2023  End Date: --    PANTOPRAZOLE (PROTONIX) 40 MG TABLET    Take 1 tablet (40 mg total) by mouth 2 (two) times daily.       Start Date: 7/11/2023 End Date: 10/9/2023    PROPRANOLOL (INDERAL) 10 MG TABLET    Take 1 tablet (10 mg total) by mouth daily as needed.       Start Date: 7/11/2023 End Date: --    VYVANSE 50 MG CAPSULE    Take 1 capsule (50 mg total) by mouth every morning.       Start Date: 7/11/2023 End Date: --        The patient's Health Maintenance was reviewed and the following appears to be due at this time:   Health Maintenance Due   Topic Date Due    HIV Screening  Never done    TETANUS VACCINE  Never done    Shingles Vaccine (1 of 2) Never done     Health Maintenance Topics with due status: Not Due       Topic Last Completion Date    Colorectal Cancer Screening 10/31/2022    Influenza Vaccine 11/01/2022    Mammogram 11/18/2022    Cervical Cancer Screening 02/15/2023    Lipid Panel 06/08/2023        Alcohol/Tobacco Use - Discussed importance of smoking avoidance/cessation and limiting alcohol intake.    CVD Risk Factors - Reviewed and discussed with patient    Obesity/Physical Activity - BMI = Body mass index is 39.06 kg/m².. Encouraged 30 minute daily physical activity, 5 days per week.     STD screening (At least once 15-65y or when necessary) - future     Depression screening (Every year or when necessary)- negative    Low dose CT (50-79yo with 20 pack year smoking history and currently smoke or have quit in the last 15years. D/C screening after >15yrs quit smoking): NA     Osteoporosis Screening (start at 66yo or 50y with risk) - NA    Colon Cancer Screening - 10/2022. Polyps. F/u in 3yrs    Cervical Cancer Screening - 2/15/23    Breast Cancer Screening - 11/2022    Eye Exam - Recommend annual eye exams.    Dental Exam - Recommend biannual exams.     Vaccinations - will obtain at pharmacy    Follow up in about 3 months (around  11/10/2023) for Obesity. In addition to their scheduled follow up, the patient has also been instructed to follow up on as needed basis.

## 2023-08-10 ENCOUNTER — OFFICE VISIT (OUTPATIENT)
Dept: PRIMARY CARE CLINIC | Facility: CLINIC | Age: 53
End: 2023-08-10
Payer: COMMERCIAL

## 2023-08-10 ENCOUNTER — TELEPHONE (OUTPATIENT)
Dept: PRIMARY CARE CLINIC | Facility: CLINIC | Age: 53
End: 2023-08-10

## 2023-08-10 VITALS
SYSTOLIC BLOOD PRESSURE: 113 MMHG | HEART RATE: 86 BPM | BODY MASS INDEX: 39.06 KG/M2 | OXYGEN SATURATION: 98 % | DIASTOLIC BLOOD PRESSURE: 75 MMHG | WEIGHT: 234.69 LBS

## 2023-08-10 DIAGNOSIS — Z00.00 WELLNESS EXAMINATION: Primary | ICD-10-CM

## 2023-08-10 DIAGNOSIS — E78.2 MIXED HYPERLIPIDEMIA: ICD-10-CM

## 2023-08-10 DIAGNOSIS — R21 RASH: ICD-10-CM

## 2023-08-10 DIAGNOSIS — E66.9 CLASS 2 OBESITY WITH BODY MASS INDEX (BMI) OF 38.0 TO 38.9 IN ADULT, UNSPECIFIED OBESITY TYPE, UNSPECIFIED WHETHER SERIOUS COMORBIDITY PRESENT: ICD-10-CM

## 2023-08-10 DIAGNOSIS — R73.03 PREDIABETES: ICD-10-CM

## 2023-08-10 PROCEDURE — 4010F ACE/ARB THERAPY RXD/TAKEN: CPT | Mod: CPTII,,, | Performed by: STUDENT IN AN ORGANIZED HEALTH CARE EDUCATION/TRAINING PROGRAM

## 2023-08-10 PROCEDURE — 99396 PREV VISIT EST AGE 40-64: CPT | Mod: ,,, | Performed by: STUDENT IN AN ORGANIZED HEALTH CARE EDUCATION/TRAINING PROGRAM

## 2023-08-10 PROCEDURE — 1159F PR MEDICATION LIST DOCUMENTED IN MEDICAL RECORD: ICD-10-PCS | Mod: CPTII,,, | Performed by: STUDENT IN AN ORGANIZED HEALTH CARE EDUCATION/TRAINING PROGRAM

## 2023-08-10 PROCEDURE — 1159F MED LIST DOCD IN RCRD: CPT | Mod: CPTII,,, | Performed by: STUDENT IN AN ORGANIZED HEALTH CARE EDUCATION/TRAINING PROGRAM

## 2023-08-10 PROCEDURE — 99396 PR PREVENTIVE VISIT,EST,40-64: ICD-10-PCS | Mod: ,,, | Performed by: STUDENT IN AN ORGANIZED HEALTH CARE EDUCATION/TRAINING PROGRAM

## 2023-08-10 PROCEDURE — 3008F BODY MASS INDEX DOCD: CPT | Mod: CPTII,,, | Performed by: STUDENT IN AN ORGANIZED HEALTH CARE EDUCATION/TRAINING PROGRAM

## 2023-08-10 PROCEDURE — 3008F PR BODY MASS INDEX (BMI) DOCUMENTED: ICD-10-PCS | Mod: CPTII,,, | Performed by: STUDENT IN AN ORGANIZED HEALTH CARE EDUCATION/TRAINING PROGRAM

## 2023-08-10 PROCEDURE — 99213 OFFICE O/P EST LOW 20 MIN: CPT | Mod: 25,,, | Performed by: STUDENT IN AN ORGANIZED HEALTH CARE EDUCATION/TRAINING PROGRAM

## 2023-08-10 PROCEDURE — 99213 PR OFFICE/OUTPT VISIT, EST, LEVL III, 20-29 MIN: ICD-10-PCS | Mod: 25,,, | Performed by: STUDENT IN AN ORGANIZED HEALTH CARE EDUCATION/TRAINING PROGRAM

## 2023-08-10 PROCEDURE — 3074F SYST BP LT 130 MM HG: CPT | Mod: CPTII,,, | Performed by: STUDENT IN AN ORGANIZED HEALTH CARE EDUCATION/TRAINING PROGRAM

## 2023-08-10 PROCEDURE — 3078F PR MOST RECENT DIASTOLIC BLOOD PRESSURE < 80 MM HG: ICD-10-PCS | Mod: CPTII,,, | Performed by: STUDENT IN AN ORGANIZED HEALTH CARE EDUCATION/TRAINING PROGRAM

## 2023-08-10 PROCEDURE — 3078F DIAST BP <80 MM HG: CPT | Mod: CPTII,,, | Performed by: STUDENT IN AN ORGANIZED HEALTH CARE EDUCATION/TRAINING PROGRAM

## 2023-08-10 PROCEDURE — 3074F PR MOST RECENT SYSTOLIC BLOOD PRESSURE < 130 MM HG: ICD-10-PCS | Mod: CPTII,,, | Performed by: STUDENT IN AN ORGANIZED HEALTH CARE EDUCATION/TRAINING PROGRAM

## 2023-08-10 PROCEDURE — 1160F PR REVIEW ALL MEDS BY PRESCRIBER/CLIN PHARMACIST DOCUMENTED: ICD-10-PCS | Mod: CPTII,,, | Performed by: STUDENT IN AN ORGANIZED HEALTH CARE EDUCATION/TRAINING PROGRAM

## 2023-08-10 PROCEDURE — 1160F RVW MEDS BY RX/DR IN RCRD: CPT | Mod: CPTII,,, | Performed by: STUDENT IN AN ORGANIZED HEALTH CARE EDUCATION/TRAINING PROGRAM

## 2023-08-10 PROCEDURE — 4010F PR ACE/ARB THEARPY RXD/TAKEN: ICD-10-PCS | Mod: CPTII,,, | Performed by: STUDENT IN AN ORGANIZED HEALTH CARE EDUCATION/TRAINING PROGRAM

## 2023-08-10 RX ORDER — MUPIROCIN 20 MG/G
OINTMENT TOPICAL 3 TIMES DAILY
Qty: 30 G | Refills: 1 | Status: SHIPPED | OUTPATIENT
Start: 2023-08-10

## 2023-08-10 NOTE — TELEPHONE ENCOUNTER
----- Message from Heidy Hernandez sent at 8/10/2023  2:51 PM CDT -----  Regarding: refill  Type:  RX Refill Request    Who Called: pt  Refill or New Rx:new  RX Name and Strength:adapex  How is the patient currently taking it? (ex. 1XDay):  Is this a 30 day or 90 day RX:  Preferred Pharmacy with phone number: [facility] in [place]  Local or Mail Order:local  Ordering Provider:dr robbins  Would the patient rather a call back or a response via [facility]? C/b  Best Call Back Number:[phone]  Additional Information: pharmacy denied ozempic pt is willing to go back to adapex or any other substitute pcp feels would work

## 2023-08-11 ENCOUNTER — PATIENT MESSAGE (OUTPATIENT)
Dept: PRIMARY CARE CLINIC | Facility: CLINIC | Age: 53
End: 2023-08-11
Payer: COMMERCIAL

## 2023-08-11 DIAGNOSIS — E66.9 CLASS 2 OBESITY WITH BODY MASS INDEX (BMI) OF 38.0 TO 38.9 IN ADULT, UNSPECIFIED OBESITY TYPE, UNSPECIFIED WHETHER SERIOUS COMORBIDITY PRESENT: Primary | ICD-10-CM

## 2023-08-11 RX ORDER — PHENTERMINE HYDROCHLORIDE 37.5 MG/1
37.5 TABLET ORAL
Qty: 30 TABLET | Refills: 2 | Status: SHIPPED | OUTPATIENT
Start: 2023-08-11 | End: 2023-11-10 | Stop reason: SDUPTHER

## 2023-08-11 NOTE — TELEPHONE ENCOUNTER
Please inform patient that I have sent Adipex to her pharmacy.  Please let patient know to tell us if she is having the issues with medication    Thank you,    Dr. Guerra

## 2023-09-06 ENCOUNTER — PATIENT MESSAGE (OUTPATIENT)
Dept: ADMINISTRATIVE | Facility: OTHER | Age: 53
End: 2023-09-06
Payer: COMMERCIAL

## 2023-09-06 DIAGNOSIS — M47.812 SPONDYLOSIS OF CERVICAL REGION WITHOUT MYELOPATHY OR RADICULOPATHY: ICD-10-CM

## 2023-09-06 RX ORDER — LISDEXAMFETAMINE DIMESYLATE 50 MG/1
50 CAPSULE ORAL EVERY MORNING
Qty: 30 CAPSULE | Refills: 0 | Status: SHIPPED | OUTPATIENT
Start: 2023-09-06 | End: 2023-09-29

## 2023-09-07 ENCOUNTER — OFFICE VISIT (OUTPATIENT)
Dept: URGENT CARE | Facility: CLINIC | Age: 53
End: 2023-09-07
Payer: COMMERCIAL

## 2023-09-07 VITALS
DIASTOLIC BLOOD PRESSURE: 85 MMHG | RESPIRATION RATE: 16 BRPM | BODY MASS INDEX: 40.32 KG/M2 | TEMPERATURE: 99 F | SYSTOLIC BLOOD PRESSURE: 122 MMHG | WEIGHT: 242 LBS | HEIGHT: 65 IN | OXYGEN SATURATION: 97 % | HEART RATE: 84 BPM

## 2023-09-07 DIAGNOSIS — R53.83 FATIGUE, UNSPECIFIED TYPE: ICD-10-CM

## 2023-09-07 DIAGNOSIS — R35.0 FREQUENCY OF URINATION: Primary | ICD-10-CM

## 2023-09-07 LAB
BILIRUB UR QL STRIP: POSITIVE
CTP QC/QA: YES
GLUCOSE UR QL STRIP: NEGATIVE
KETONES UR QL STRIP: NEGATIVE
LEUKOCYTE ESTERASE UR QL STRIP: NEGATIVE
PH, POC UA: 5
POC BLOOD, URINE: NEGATIVE
POC NITRATES, URINE: NEGATIVE
PROT UR QL STRIP: NEGATIVE
SARS-COV-2 RDRP RESP QL NAA+PROBE: NEGATIVE
SP GR UR STRIP: 1.02 (ref 1–1.03)
UROBILINOGEN UR STRIP-ACNC: 4 (ref 0.1–1.1)

## 2023-09-07 PROCEDURE — 99213 PR OFFICE/OUTPT VISIT, EST, LEVL III, 20-29 MIN: ICD-10-PCS | Mod: ,,,

## 2023-09-07 PROCEDURE — 87635 SARS-COV-2 COVID-19 AMP PRB: CPT | Mod: QW,,,

## 2023-09-07 PROCEDURE — 81003 URINALYSIS AUTO W/O SCOPE: CPT | Mod: QW,,,

## 2023-09-07 PROCEDURE — 87635: ICD-10-PCS | Mod: QW,,,

## 2023-09-07 PROCEDURE — 87088 URINE BACTERIA CULTURE: CPT

## 2023-09-07 PROCEDURE — 81003 POCT URINALYSIS, DIPSTICK, MANUAL, W/O SCOPE: ICD-10-PCS | Mod: QW,,,

## 2023-09-07 PROCEDURE — 99213 OFFICE O/P EST LOW 20 MIN: CPT | Mod: ,,,

## 2023-09-07 NOTE — PATIENT INSTRUCTIONS
COVID negative.     As discussed, no obvious abnormalities noted on urinalysis done in clinic today.  We will send for culture and call you with in the next 2 days with results.  If anything grows, we will start you on an antibiotic at that time.     Recommend that you do follow up with primary care on Tuesday if you continue to have symptoms.     Go to ER for further assessment if he began to have severe abdominal pain, high fevers, other worrisome symptoms

## 2023-09-07 NOTE — PROGRESS NOTES
"Subjective:      Patient ID: Jadyn Wilder is a 53 y.o. female.    Vitals:  height is 5' 5" (1.651 m) and weight is 109.8 kg (242 lb). Her temperature is 98.6 °F (37 °C). Her blood pressure is 122/85 and her pulse is 84. Her respiration is 16 and oxygen saturation is 97%.     Chief Complaint: Dysuria (Fatigue, nausea, lower back pain, urinary odor , pressure in lower abdomen, frequent urge to urinate x Tuesday )    Patient is a 53-year-old female that presents complaining of frequent urination (but states has always had this since getting older), urinary odor, lower back pain (only in the morning yesterday and today), nausea (none now)  and fatigue for the past 3 days.  Denies any severe abdominal pain, fever, vomiting at this time.     States she has an appointment with her primary care doctor on Tuesday just would like to make sure it is not a UTI or COVID.    Dysuria   Associated symptoms include flank pain, frequency, nausea and urgency.       Constitution: Positive for fatigue.   Gastrointestinal:  Positive for nausea.   Genitourinary:  Positive for frequency, urgency and flank pain.      Objective:     Physical Exam   Constitutional: She is oriented to person, place, and time. She appears well-developed. She is cooperative.  Non-toxic appearance. She does not appear ill. No distress.   HENT:   Mouth/Throat: Uvula is midline.   Eyes: Lids are normal.   Neck: Trachea normal and phonation normal. No edema present. No erythema present.   Cardiovascular: Normal rate, regular rhythm, normal heart sounds and normal pulses.   Pulmonary/Chest: Effort normal and breath sounds normal. No respiratory distress. She has no decreased breath sounds. She has no rhonchi.   Abdominal: Normal appearance and bowel sounds are normal. She exhibits no distension. Soft. There is no abdominal tenderness. There is no left CVA tenderness and no right CVA tenderness.   Musculoskeletal: Normal range of motion.         General: No " deformity. Normal range of motion.   Neurological: She is alert and oriented to person, place, and time. She exhibits normal muscle tone. Coordination normal.   Skin: Skin is warm, dry, intact, not diaphoretic and not pale.   Psychiatric: Her speech is normal and behavior is normal. Judgment and thought content normal.   Nursing note and vitals reviewed.      Assessment:     1. Frequency of urination    2. Fatigue, unspecified type           Previous History      Review of patient's allergies indicates:  No Known Allergies    Past Medical History:   Diagnosis Date    ADD (attention deficit disorder)     Anxiety     and depression    Closed trimalleolar fracture of right ankle with routine healing 06/22/2022    Finger pain, right 06/18/2023    Hypertension     right finger 06/18/2023    Screening for malignant neoplasm of colon     Segmental and somatic dysfunction     Spondylosis of cervical region without myelopathy or radiculopathy     Vitamin D deficiency 07/11/2023     Current Outpatient Medications   Medication Instructions    amlodipine-valsartan (EXFORGE) 5-160 mg per tablet 1 tablet, Oral, Daily    FLUoxetine 40 mg, Oral, Daily    meloxicam (MOBIC) 15 mg, Oral    mupirocin (BACTROBAN) 2 % ointment Topical (Top), 3 times daily    pantoprazole (PROTONIX) 40 mg, Oral, 2 times daily    phentermine (ADIPEX-P) 37.5 mg, Oral, Before breakfast    propranoloL (INDERAL) 10 mg, Oral, Daily PRN    VYVANSE 50 mg, Oral, Every morning     Past Surgical History:   Procedure Laterality Date    KNEE ARTHROSCOPY      OPEN REDUCTION AND INTERNAL FIXATION (ORIF) OF INJURY OF ANKLE Right 06/23/2022    Procedure: ORIF, ANKLE-will start prone-Synthes;  Surgeon: Ezekiel Emery Jr., MD;  Location: [facility];  Service: Orthopedics;  Laterality: Right;    TUBAL LIGATION       Family History   Problem Relation Age of Onset    Diabetes Mother     Hypertension Father     Skin cancer Father     No Known Problems Sister     No Known Problems  "Brother     Colon cancer Maternal Grandmother     Pancreatic cancer Maternal Grandfather        Social History     Tobacco Use    Smoking status: Former     Types: Cigarettes     Passive exposure: Never    Smokeless tobacco: Never    Tobacco comments:     Quit >15yrs ago   Substance Use Topics    Alcohol use: Not Currently     Alcohol/week: 1.0 standard drink of alcohol     Types: 1 Glasses of wine per week    Drug use: Never        Physical Exam      Vital Signs Reviewed   /85   Pulse 84   Temp 98.6 °F (37 °C)   Resp 16   Ht 5' 5" (1.651 m)   Wt 109.8 kg (242 lb)   LMP  (LMP Unknown)   SpO2 97%   BMI 40.27 kg/m²        Procedures    Procedures     Labs     Results for orders placed or performed in visit on 09/07/23   POCT Urinalysis, Dipstick, Manual, W/O Scope   Result Value Ref Range    POC Blood, Urine Negative Negative    POC Bilirubin, Urine Positive (A) Negative    POC Urobilinogen, Urine 4 (A) 0.1 - 1.1    POC Ketones, Urine Negative Negative    POC Protein, Urine Negative Negative    POC Nitrates, Urine Negative Negative    POC Glucose, Urine Negative Negative    pH, UA 5     POC Specific Gravity, Urine 1.020 1.003 - 1.029    POC Leukocytes, Urine Negative Negative   POCT COVID-19 Rapid Screening   Result Value Ref Range    POC Rapid COVID Negative Negative     Acceptable Yes       Plan:     Patient states symptoms are not that bad so agreed with plan to hold antibiotic and only prescribe if urine culture is positive for bacteria.  States she is following up with her primary care doctor on Tuesday.     Patient also requested COVID testing due to fatigue.     Frequency of urination  -     Urine culture  -     POCT Urinalysis, Dipstick, Manual, W/O Scope    Fatigue, unspecified type  -     POCT COVID-19 Rapid Screening      As discussed, no obvious abnormalities noted on urinalysis done in clinic today.  We will send for culture and call you with in the next 2 days with results.  " If anything grows, we will start you on an antibiotic at that time.     Recommend that you do follow up with primary care on Tuesday if you continue to have symptoms.     Go to ER for further assessment if he began to have severe abdominal pain, high fevers, other worrisome symptoms

## 2023-09-09 LAB — BACTERIA UR CULT: NORMAL

## 2023-09-11 ENCOUNTER — PATIENT MESSAGE (OUTPATIENT)
Dept: PRIMARY CARE CLINIC | Facility: CLINIC | Age: 53
End: 2023-09-11

## 2023-09-11 ENCOUNTER — PATIENT MESSAGE (OUTPATIENT)
Dept: ADMINISTRATIVE | Facility: HOSPITAL | Age: 53
End: 2023-09-11
Payer: COMMERCIAL

## 2023-09-11 ENCOUNTER — OFFICE VISIT (OUTPATIENT)
Dept: PRIMARY CARE CLINIC | Facility: CLINIC | Age: 53
End: 2023-09-11
Payer: COMMERCIAL

## 2023-09-11 VITALS
WEIGHT: 235.5 LBS | TEMPERATURE: 98 F | DIASTOLIC BLOOD PRESSURE: 84 MMHG | BODY MASS INDEX: 39.24 KG/M2 | HEIGHT: 65 IN | HEART RATE: 82 BPM | OXYGEN SATURATION: 97 % | SYSTOLIC BLOOD PRESSURE: 123 MMHG

## 2023-09-11 DIAGNOSIS — M54.9 BACK PAIN, UNSPECIFIED BACK LOCATION, UNSPECIFIED BACK PAIN LATERALITY, UNSPECIFIED CHRONICITY: ICD-10-CM

## 2023-09-11 DIAGNOSIS — N30.00 ACUTE CYSTITIS WITHOUT HEMATURIA: Primary | ICD-10-CM

## 2023-09-11 DIAGNOSIS — R82.2 BILIRUBINURIA: ICD-10-CM

## 2023-09-11 LAB
APPEARANCE UR: ABNORMAL
BACTERIA #/AREA URNS AUTO: ABNORMAL /HPF
BILIRUB UR QL STRIP.AUTO: NEGATIVE
COLOR UR: ABNORMAL
GLUCOSE UR QL STRIP.AUTO: NEGATIVE
KETONES UR QL STRIP.AUTO: ABNORMAL
LEUKOCYTE ESTERASE UR QL STRIP.AUTO: ABNORMAL
MUCOUS THREADS URNS QL MICRO: ABNORMAL /LPF
NITRITE UR QL STRIP.AUTO: NEGATIVE
PH UR STRIP.AUTO: 6 [PH]
PROT UR QL STRIP.AUTO: ABNORMAL
RBC #/AREA URNS AUTO: ABNORMAL /HPF
RBC UR QL AUTO: NEGATIVE
SP GR UR STRIP.AUTO: 1.02 (ref 1–1.03)
SQUAMOUS #/AREA URNS AUTO: 10 /HPF
UROBILINOGEN UR STRIP-ACNC: 1
WBC #/AREA URNS AUTO: <5 /HPF

## 2023-09-11 PROCEDURE — 4010F ACE/ARB THERAPY RXD/TAKEN: CPT | Mod: CPTII,,, | Performed by: STUDENT IN AN ORGANIZED HEALTH CARE EDUCATION/TRAINING PROGRAM

## 2023-09-11 PROCEDURE — 3079F PR MOST RECENT DIASTOLIC BLOOD PRESSURE 80-89 MM HG: ICD-10-PCS | Mod: CPTII,,, | Performed by: STUDENT IN AN ORGANIZED HEALTH CARE EDUCATION/TRAINING PROGRAM

## 2023-09-11 PROCEDURE — 81001 URINALYSIS AUTO W/SCOPE: CPT | Performed by: STUDENT IN AN ORGANIZED HEALTH CARE EDUCATION/TRAINING PROGRAM

## 2023-09-11 PROCEDURE — 3079F DIAST BP 80-89 MM HG: CPT | Mod: CPTII,,, | Performed by: STUDENT IN AN ORGANIZED HEALTH CARE EDUCATION/TRAINING PROGRAM

## 2023-09-11 PROCEDURE — 99214 PR OFFICE/OUTPT VISIT, EST, LEVL IV, 30-39 MIN: ICD-10-PCS | Mod: ,,, | Performed by: STUDENT IN AN ORGANIZED HEALTH CARE EDUCATION/TRAINING PROGRAM

## 2023-09-11 PROCEDURE — 3074F PR MOST RECENT SYSTOLIC BLOOD PRESSURE < 130 MM HG: ICD-10-PCS | Mod: CPTII,,, | Performed by: STUDENT IN AN ORGANIZED HEALTH CARE EDUCATION/TRAINING PROGRAM

## 2023-09-11 PROCEDURE — 3008F BODY MASS INDEX DOCD: CPT | Mod: CPTII,,, | Performed by: STUDENT IN AN ORGANIZED HEALTH CARE EDUCATION/TRAINING PROGRAM

## 2023-09-11 PROCEDURE — 3074F SYST BP LT 130 MM HG: CPT | Mod: CPTII,,, | Performed by: STUDENT IN AN ORGANIZED HEALTH CARE EDUCATION/TRAINING PROGRAM

## 2023-09-11 PROCEDURE — 1160F RVW MEDS BY RX/DR IN RCRD: CPT | Mod: CPTII,,, | Performed by: STUDENT IN AN ORGANIZED HEALTH CARE EDUCATION/TRAINING PROGRAM

## 2023-09-11 PROCEDURE — 99214 OFFICE O/P EST MOD 30 MIN: CPT | Mod: ,,, | Performed by: STUDENT IN AN ORGANIZED HEALTH CARE EDUCATION/TRAINING PROGRAM

## 2023-09-11 PROCEDURE — 3008F PR BODY MASS INDEX (BMI) DOCUMENTED: ICD-10-PCS | Mod: CPTII,,, | Performed by: STUDENT IN AN ORGANIZED HEALTH CARE EDUCATION/TRAINING PROGRAM

## 2023-09-11 PROCEDURE — 1159F PR MEDICATION LIST DOCUMENTED IN MEDICAL RECORD: ICD-10-PCS | Mod: CPTII,,, | Performed by: STUDENT IN AN ORGANIZED HEALTH CARE EDUCATION/TRAINING PROGRAM

## 2023-09-11 PROCEDURE — 1160F PR REVIEW ALL MEDS BY PRESCRIBER/CLIN PHARMACIST DOCUMENTED: ICD-10-PCS | Mod: CPTII,,, | Performed by: STUDENT IN AN ORGANIZED HEALTH CARE EDUCATION/TRAINING PROGRAM

## 2023-09-11 PROCEDURE — 4010F PR ACE/ARB THEARPY RXD/TAKEN: ICD-10-PCS | Mod: CPTII,,, | Performed by: STUDENT IN AN ORGANIZED HEALTH CARE EDUCATION/TRAINING PROGRAM

## 2023-09-11 PROCEDURE — 1159F MED LIST DOCD IN RCRD: CPT | Mod: CPTII,,, | Performed by: STUDENT IN AN ORGANIZED HEALTH CARE EDUCATION/TRAINING PROGRAM

## 2023-09-11 RX ORDER — DICLOFENAC SODIUM 75 MG/1
75 TABLET, DELAYED RELEASE ORAL 2 TIMES DAILY
Qty: 60 TABLET | Refills: 11 | Status: SHIPPED | OUTPATIENT
Start: 2023-09-11

## 2023-09-11 RX ORDER — NITROFURANTOIN 25; 75 MG/1; MG/1
100 CAPSULE ORAL 2 TIMES DAILY
Qty: 10 CAPSULE | Refills: 0 | Status: SHIPPED | OUTPATIENT
Start: 2023-09-11 | End: 2023-09-16

## 2023-09-11 NOTE — PROGRESS NOTES
Date: 09/11/2023  Patient ID: [id number]   Chief Complaint: Follow-up (Visit at , possible bladder infection, urinary frequency)    HPI:   Jadyn Wilder is a 53 y.o. female here today for Follow-up (Visit at , possible bladder infection, urinary frequency)  Patient visited  3 days ago where they obtained UA, which came back negative but did show urobilinogen. Patient was feeling unwell with left lower back pain that worsens with laying and getting up (improving with Ibuprofen), urinary frequency/urgency/ post-void residual feeling. Denies hematuria, nausea, vomiting, abdominal pain, fevers, chills recently. Eating well.    Past Medical History:   Diagnosis Date    ADD (attention deficit disorder)     Anxiety     and depression    Closed trimalleolar fracture of right ankle with routine healing 06/22/2022    Finger pain, right 06/18/2023    Hypertension     right finger 06/18/2023    Screening for malignant neoplasm of colon     Segmental and somatic dysfunction     Spondylosis of cervical region without myelopathy or radiculopathy     Vitamin D deficiency 07/11/2023     Outpatient Medications Marked as Taking for the 9/11/23 encounter (Office Visit) with Jazmín Guerra MD   Medication Sig Dispense Refill    amlodipine-valsartan (EXFORGE) 5-160 mg per tablet Take 1 tablet by mouth once daily. 90 tablet 3    FLUoxetine 40 MG capsule Take 1 capsule (40 mg total) by mouth once daily. 910 capsule 3    mupirocin (BACTROBAN) 2 % ointment Apply topically 3 (three) times daily. 30 g 1    pantoprazole (PROTONIX) 40 MG tablet Take 1 tablet (40 mg total) by mouth 2 (two) times daily. 60 tablet 2    phentermine (ADIPEX-P) 37.5 mg tablet Take 1 tablet (37.5 mg total) by mouth before breakfast. 30 tablet 2    propranoloL (INDERAL) 10 MG tablet Take 1 tablet (10 mg total) by mouth daily as needed. 30 tablet 11    VYVANSE 50 mg capsule Take 1 capsule (50 mg total) by mouth every morning. 30 capsule 0     Past Surgical  History:   Procedure Laterality Date    KNEE ARTHROSCOPY      OPEN REDUCTION AND INTERNAL FIXATION (ORIF) OF INJURY OF ANKLE Right 06/23/2022    Procedure: ORIF, ANKLE-will start prone-Synthes;  Surgeon: Ezekiel Emery Jr., MD;  Location: [facility];  Service: Orthopedics;  Laterality: Right;    TUBAL LIGATION       Review of patient's allergies indicates:  No Known Allergies  Family History   Problem Relation Age of Onset    Diabetes Mother     Hypertension Father     Skin cancer Father     No Known Problems Sister     No Known Problems Brother     Colon cancer Maternal Grandmother     Pancreatic cancer Maternal Grandfather       Social History     Socioeconomic History    Marital status: Significant Other   Occupational History    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   Tobacco Use    Smoking status: Former     Types: Cigarettes     Passive exposure: Never    Smokeless tobacco: Never    Tobacco comments:     Quit >15yrs ago   Substance and Sexual Activity    Alcohol use: Not Currently     Alcohol/week: 1.0 standard drink of alcohol     Types: 1 Glasses of wine per week    Drug use: Never    Sexual activity: Yes     Partners: Male     Birth control/protection: See Surgical Hx     Comment: fiance   Social History Narrative    Has granddaughter 5mos olf     Patient Care Team:  Jazmín Guerra MD as PCP - General (Family Medicine)  Nida Waldrop MD as Hypertension Digital Medicine Responsible Provider (Family Medicine)  Robin Montiel as Digital Medicine Health   Maryuri Andersen PharmD as Hypertension Digital Medicine Clinician (Pharmacist)  1, [facility] as Hypertension Digital Medicine Contract   Subjective:   ROS  See HPI for details  All Other ROS: Negative except as stated in HPI.   Answers submitted by the patient for this visit:  Back Pain Questionnaire (Submitted on 9/8/2023)  Chief Complaint: Back pain  Chronicity: recurrent  Onset: in the past 7 days  Frequency: daily  Progression since  "onset: unchanged  Pain location: lumbar spine  Pain quality: shooting, stabbing  Radiates to: does not radiate  Pain - numeric: 6/10  Pain is: worse during the night  Aggravated by: lying down  Stiffness is present: in the morning  bladder incontinence: No  bowel incontinence: No  leg pain: No  numbness: No  paresis: No  paresthesias: No  pelvic pain: No  perianal numbness: No  genital pain: No  Pain severity: moderate  Improvement on treatment: mild  Objective:   /84   Pulse 82   Temp 97.6 °F (36.4 °C)   Ht 5' 5" (1.651 m)   Wt 106.8 kg (235 lb 8 oz)   LMP  (LMP Unknown)   SpO2 97%   BMI 39.19 kg/m²   Physical Exam  Cardiovascular:      Rate and Rhythm: Normal rate and regular rhythm.   Pulmonary:      Effort: Pulmonary effort is normal.   Abdominal:      General: Abdomen is flat. Bowel sounds are normal. There is no distension.      Palpations: Abdomen is soft.      Tenderness: There is no abdominal tenderness. There is no right CVA tenderness, left CVA tenderness, guarding or rebound.   Musculoskeletal:      Comments: BL lumbar spine NTTP    Neurological:      Mental Status: She is alert.       Assessment:       ICD-10-CM ICD-9-CM   1. Acute cystitis without hematuria  N30.00 595.0   2. Bilirubinuria  R82.2 791.4   3. Back pain, unspecified back location, unspecified back pain laterality, unspecified chronicity  M54.9 724.5      Plan:   1. Acute cystitis without hematuria  Overview:  Start Rx Macrobid until sensitivities result and will change Rx if necessary  Complete full course of antibiotics.  Can use OTC AZO for relief of urinary spasms.  UA + C/S ordered.   Report any continuing/worsening symptoms after antibiotic completion such as nausea/vomiting, low back pain, blood in urine, burning with urination, or fever/body aches/chills, etc.  Drink plenty of water daily. Avoid soda and sugary drinks  Increase hydration  Wipe from front to back  Cotton underwear  Avoid douching  Empty bladder before " and after intercourse  Urinate frequently. Do not hold urine for extended periods of time  ED precautions            Orders:  -     Urinalysis, Reflex to Urine Culture  -     Urine culture  -     nitrofurantoin, macrocrystal-monohydrate, (MACROBID) 100 MG capsule; Take 1 capsule (100 mg total) by mouth 2 (two) times daily. for 5 days  Dispense: 10 capsule; Refill: 0    2. Bilirubinuria  Overview:  Obtain CMP    Orders:  -     Urinalysis, Reflex to Urine Culture  -     Urine culture  -     Comprehensive Metabolic Panel; Future; Expected date: 09/11/2023    3. Back pain, unspecified back location, unspecified back pain laterality, unspecified chronicity  Overview:  Resolution typically within 3 weeks in 91% of patients, and a 4% recurrence rate after 2 years  Start Rx: Diclofenac  Consider Physical Therapy  Avoid provocative activities  Diclofenac Gel applied to affected area bid prn  Lidocaine 4% patch on for 12 of every 24 hours prn  Rest, NSAIDs (OTC Tylenol/Ibuprofen), Local heat or ice to the area  Home exercise program  Consider using supportive brace  Weight loss  Consider acupuncture  Activity as tolerated  If symptoms worsen, consider referral to sports med/ortho for further evaluation/intervention. Also consider pain mgmt          Orders:  -     diclofenac (VOLTAREN) 75 MG EC tablet; Take 1 tablet (75 mg total) by mouth 2 (two) times daily.  Dispense: 60 tablet; Refill: 11         Medication List with Changes/Refills   New Medications    DICLOFENAC (VOLTAREN) 75 MG EC TABLET    Take 1 tablet (75 mg total) by mouth 2 (two) times daily.       Start Date: 9/11/2023 End Date: --    NITROFURANTOIN, MACROCRYSTAL-MONOHYDRATE, (MACROBID) 100 MG CAPSULE    Take 1 capsule (100 mg total) by mouth 2 (two) times daily. for 5 days       Start Date: 9/11/2023 End Date: 9/16/2023   Current Medications    AMLODIPINE-VALSARTAN (EXFORGE) 5-160 MG PER TABLET    Take 1 tablet by mouth once daily.       Start Date: 7/11/2023 End  Date: --    FLUOXETINE 40 MG CAPSULE    Take 1 capsule (40 mg total) by mouth once daily.       Start Date: 7/11/2023 End Date: --    MUPIROCIN (BACTROBAN) 2 % OINTMENT    Apply topically 3 (three) times daily.       Start Date: 8/10/2023 End Date: --    PANTOPRAZOLE (PROTONIX) 40 MG TABLET    Take 1 tablet (40 mg total) by mouth 2 (two) times daily.       Start Date: 7/11/2023 End Date: 10/9/2023    PHENTERMINE (ADIPEX-P) 37.5 MG TABLET    Take 1 tablet (37.5 mg total) by mouth before breakfast.       Start Date: 8/11/2023 End Date: --    PROPRANOLOL (INDERAL) 10 MG TABLET    Take 1 tablet (10 mg total) by mouth daily as needed.       Start Date: 7/11/2023 End Date: --    VYVANSE 50 MG CAPSULE    Take 1 capsule (50 mg total) by mouth every morning.       Start Date: 9/6/2023  End Date: --   Discontinued Medications    MELOXICAM (MOBIC) 15 MG TABLET    Take 15 mg by mouth.       Start Date: 4/3/2023  End Date: 9/11/2023        Follow up in about 2 weeks (around 9/25/2023) for UTI, urobilirubin. In addition to their scheduled follow up, the patient has also been instructed to follow up on as needed basis.

## 2023-09-12 DIAGNOSIS — M47.812 SPONDYLOSIS OF CERVICAL REGION WITHOUT MYELOPATHY OR RADICULOPATHY: Primary | ICD-10-CM

## 2023-09-12 DIAGNOSIS — N30.00 ACUTE CYSTITIS WITHOUT HEMATURIA: ICD-10-CM

## 2023-09-12 NOTE — TELEPHONE ENCOUNTER
Please inform patient that lab orders are in. Patient was also started on antibiotics. If her symptoms do not improve, she can call for another appt.    Thank you,    Dr. Guerra

## 2023-09-20 ENCOUNTER — TELEPHONE (OUTPATIENT)
Dept: PRIMARY CARE CLINIC | Facility: CLINIC | Age: 53
End: 2023-09-20
Payer: COMMERCIAL

## 2023-09-20 ENCOUNTER — LAB VISIT (OUTPATIENT)
Dept: LAB | Facility: HOSPITAL | Age: 53
End: 2023-09-20
Attending: STUDENT IN AN ORGANIZED HEALTH CARE EDUCATION/TRAINING PROGRAM
Payer: COMMERCIAL

## 2023-09-20 DIAGNOSIS — R82.2 BILIRUBINURIA: ICD-10-CM

## 2023-09-20 DIAGNOSIS — N30.00 ACUTE CYSTITIS WITHOUT HEMATURIA: ICD-10-CM

## 2023-09-20 DIAGNOSIS — N30.00 ACUTE CYSTITIS WITHOUT HEMATURIA: Primary | ICD-10-CM

## 2023-09-20 DIAGNOSIS — M47.812 SPONDYLOSIS OF CERVICAL REGION WITHOUT MYELOPATHY OR RADICULOPATHY: ICD-10-CM

## 2023-09-20 LAB
ALBUMIN SERPL-MCNC: 4 G/DL (ref 3.5–5)
ALBUMIN/GLOB SERPL: 1.2 RATIO (ref 1.1–2)
ALP SERPL-CCNC: 75 UNIT/L (ref 40–150)
ALT SERPL-CCNC: 19 UNIT/L (ref 0–55)
APPEARANCE UR: ABNORMAL
AST SERPL-CCNC: 18 UNIT/L (ref 5–34)
BACTERIA #/AREA URNS AUTO: ABNORMAL /HPF
BILIRUB SERPL-MCNC: 0.6 MG/DL
BILIRUB UR QL STRIP.AUTO: NEGATIVE
BUN SERPL-MCNC: 12.1 MG/DL (ref 9.8–20.1)
C4 SERPL-MCNC: 21 MG/DL (ref 13–46)
CALCIUM SERPL-MCNC: 9.3 MG/DL (ref 8.4–10.2)
CHLORIDE SERPL-SCNC: 103 MMOL/L (ref 98–107)
CO2 SERPL-SCNC: 25 MMOL/L (ref 22–29)
COLOR UR: YELLOW
CREAT SERPL-MCNC: 0.7 MG/DL (ref 0.55–1.02)
CRP SERPL-MCNC: 3.6 MG/L
ERYTHROCYTE [SEDIMENTATION RATE] IN BLOOD: 19 MM/HR (ref 0–20)
GFR SERPLBLD CREATININE-BSD FMLA CKD-EPI: >60 MLS/MIN/1.73/M2
GLOBULIN SER-MCNC: 3.4 GM/DL (ref 2.4–3.5)
GLUCOSE SERPL-MCNC: 94 MG/DL (ref 74–100)
GLUCOSE UR QL STRIP.AUTO: NEGATIVE
KETONES UR QL STRIP.AUTO: ABNORMAL
LEUKOCYTE ESTERASE UR QL STRIP.AUTO: ABNORMAL
MUCOUS THREADS URNS QL MICRO: ABNORMAL /LPF
NITRITE UR QL STRIP.AUTO: NEGATIVE
PH UR STRIP.AUTO: 7 [PH]
POTASSIUM SERPL-SCNC: 4.6 MMOL/L (ref 3.5–5.1)
PROT SERPL-MCNC: 7.4 GM/DL (ref 6.4–8.3)
PROT UR QL STRIP.AUTO: NEGATIVE
RBC #/AREA URNS AUTO: <5 /HPF
RBC UR QL AUTO: NEGATIVE
SODIUM SERPL-SCNC: 137 MMOL/L (ref 136–145)
SP GR UR STRIP.AUTO: 1.02 (ref 1–1.03)
SQUAMOUS #/AREA URNS AUTO: 17 /HPF
URATE SERPL-MCNC: 3.5 MG/DL (ref 2.6–6)
UROBILINOGEN UR STRIP-ACNC: 1
WBC #/AREA URNS AUTO: <5 /HPF

## 2023-09-20 PROCEDURE — 80053 COMPREHEN METABOLIC PANEL: CPT

## 2023-09-20 PROCEDURE — 86038 ANTINUCLEAR ANTIBODIES: CPT

## 2023-09-20 PROCEDURE — 86160 COMPLEMENT ANTIGEN: CPT

## 2023-09-20 PROCEDURE — 86200 CCP ANTIBODY: CPT

## 2023-09-20 PROCEDURE — 84550 ASSAY OF BLOOD/URIC ACID: CPT

## 2023-09-20 PROCEDURE — 36415 COLL VENOUS BLD VENIPUNCTURE: CPT

## 2023-09-20 PROCEDURE — 85652 RBC SED RATE AUTOMATED: CPT

## 2023-09-20 PROCEDURE — 86140 C-REACTIVE PROTEIN: CPT

## 2023-09-20 NOTE — TELEPHONE ENCOUNTER
Pt stated that she is still having UTI symptoms after completing the medication. Please advise. Thank you.

## 2023-09-21 ENCOUNTER — TELEPHONE (OUTPATIENT)
Dept: PRIMARY CARE CLINIC | Facility: CLINIC | Age: 53
End: 2023-09-21
Payer: COMMERCIAL

## 2023-09-21 NOTE — PROGRESS NOTES
Date: 09/29/2023  Patient ID: [id number]   Chief Complaint: Follow-up (Follow up on uti, labs, ct, cultures)    HPI:   Jadyn Wilder is a 53 y.o. female here today for a follow up of Follow-up (Follow up on uti, labs, ct, cultures)  Recently patient was started on Cipro for UTI sx. Symptoms have improved, and patient is able to void normally without pain. CT abd/pelvis showed no acute process but fatty infiltration of liver. Patient would like to increase Vyvanse since she is finding herself binge eating more and has lesser focus.    Patient Active Problem List   Diagnosis    Wellness examination    Primary hypertension    Pure hypertriglyceridemia    Anxiety and depression    ADD (attention deficit disorder)    Spondylosis of cervical region without myelopathy or radiculopathy    Menopausal syndrome    Vitamin D deficiency    Class 2 obesity with body mass index (BMI) of 38.0 to 38.9 in adult    Prediabetes    Mixed hyperlipidemia    Acute cystitis without hematuria    Bilirubinuria    Back pain    Binge eating     Outpatient Medications Marked as Taking for the 9/29/23 encounter (Office Visit) with Jazmín Guerra MD   Medication Sig Dispense Refill    amlodipine-valsartan (EXFORGE) 5-160 mg per tablet Take 1 tablet by mouth once daily. 90 tablet 3    ciprofloxacin HCl (CIPRO) 500 MG tablet Take 1 tablet (500 mg total) by mouth every 12 (twelve) hours. for 7 days 14 tablet 0    diclofenac (VOLTAREN) 75 MG EC tablet Take 1 tablet (75 mg total) by mouth 2 (two) times daily. 60 tablet 11    FLUoxetine 40 MG capsule Take 1 capsule (40 mg total) by mouth once daily. 910 capsule 3    mupirocin (BACTROBAN) 2 % ointment Apply topically 3 (three) times daily. 30 g 1    pantoprazole (PROTONIX) 40 MG tablet Take 1 tablet (40 mg total) by mouth 2 (two) times daily. 60 tablet 2    phenazopyridine (PYRIDIUM) 100 MG tablet Take 1 tablet (100 mg total) by mouth 3 (three) times daily as needed for Pain. 30 tablet 0    phentermine  (ADIPEX-P) 37.5 mg tablet Take 1 tablet (37.5 mg total) by mouth before breakfast. 30 tablet 2    propranoloL (INDERAL) 10 MG tablet Take 1 tablet (10 mg total) by mouth daily as needed. 30 tablet 11    [DISCONTINUED] VYVANSE 50 mg capsule Take 1 capsule (50 mg total) by mouth every morning. 30 capsule 0     Past Medical History:   Diagnosis Date    ADD (attention deficit disorder)     Anxiety     and depression    Binge eating 9/29/2023    Closed trimalleolar fracture of right ankle with routine healing 06/22/2022    Finger pain, right 06/18/2023    Hypertension     right finger 06/18/2023    Screening for malignant neoplasm of colon     Segmental and somatic dysfunction     Spondylosis of cervical region without myelopathy or radiculopathy     Vitamin D deficiency 07/11/2023     Past Surgical History:   Procedure Laterality Date    KNEE ARTHROSCOPY      OPEN REDUCTION AND INTERNAL FIXATION (ORIF) OF INJURY OF ANKLE Right 06/23/2022    Procedure: ORIF, ANKLE-will start prone-Synthes;  Surgeon: Ezekiel Emery Jr., MD;  Location: [facility];  Service: Orthopedics;  Laterality: Right;    TUBAL LIGATION       Review of patient's allergies indicates:  No Known Allergies  Family History   Problem Relation Age of Onset    Diabetes Mother     Hypertension Father     Skin cancer Father     No Known Problems Sister     No Known Problems Brother     Colon cancer Maternal Grandmother     Pancreatic cancer Maternal Grandfather       Social History     Socioeconomic History    Marital status: Significant Other   Occupational History    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   Tobacco Use    Smoking status: Former     Types: Cigarettes     Passive exposure: Never    Smokeless tobacco: Never    Tobacco comments:     Quit >15yrs ago   Substance and Sexual Activity    Alcohol use: Not Currently     Alcohol/week: 1.0 standard drink of alcohol     Types: 1 Glasses of wine per week    Drug use: Never    Sexual activity: Yes     Partners: Male     Birth  "control/protection: See Surgical Hx     Comment: valerie   Social History Narrative    Has granddaughter 5mos olf     Patient Care Team:  Jazmín Guerra MD as PCP - General (Family Medicine)  Nida Waldrop MD as Hypertension Digital Medicine Responsible Provider (Family Medicine)  Robin Montiel as Digital Medicine Health   Maryuri Andersen PharmD as Hypertension Digital Medicine Clinician (Pharmacist)  1, [employer] Employee Plan - [employer] as Hypertension Digital Medicine Contract   Subjective:   ROS  See HPI for details  All Other ROS: Negative except as stated in HPI  Objective:   /81   Pulse 85   Temp 98.8 °F (37.1 °C)   Ht 5' 5" (1.651 m)   Wt 106.5 kg (234 lb 12.8 oz)   LMP  (LMP Unknown)   SpO2 98%   BMI 39.07 kg/m²   Physical Exam  General: NAD  Eye: EOMI  HENT: no nasal discharge  Respiratory: non-labored breathing  Musculoskeletal: ambulates independently. No obvious deformity  Integumentary: no apparent discoloration  Neurologic: Alert, oriented to person and situation  Cognition and Speech: Speech clear and coherent.   Psychiatric: Cooperative  Assessment:       ICD-10-CM ICD-9-CM   1. Attention deficit disorder, unspecified hyperactivity presence  F98.8 314.00   2. Binge eating  R63.2 783.6   3. Acute cystitis without hematuria  N30.00 595.0      Plan:   1. Attention deficit disorder, unspecified hyperactivity presence  Overview:  Uncontrolled  Increase Vyvanse to 70 mg daily.  AE discussed  Controlled substance agreement signed today, obtain UDS  /narx care checked without discrepancies noted  1-month medication refills given  Quarterly visits required for continued prescriptions with minimum annual wellness exam  Discussed adverse effects that include but not limited to cardiovascular event, stroke, heart attack, death      Orders:  -     lisdexamfetamine (VYVANSE) 70 MG capsule; Take 1 capsule (70 mg total) by mouth every morning.  Dispense: 30 capsule; Refill: 0  -     " lisdexamfetamine (VYVANSE) 70 MG capsule; Take 1 capsule (70 mg total) by mouth every morning.  Dispense: 30 capsule; Refill: 0    2. Binge eating  Overview:  As above Vyvanse increased      3. Acute cystitis without hematuria  Overview:  Resolved           Medication List with Changes/Refills   New Medications    LISDEXAMFETAMINE (VYVANSE) 70 MG CAPSULE    Take 1 capsule (70 mg total) by mouth every morning.       Start Date: 10/6/2023 End Date: --    LISDEXAMFETAMINE (VYVANSE) 70 MG CAPSULE    Take 1 capsule (70 mg total) by mouth every morning.       Start Date: 11/6/2023 End Date: --   Current Medications    AMLODIPINE-VALSARTAN (EXFORGE) 5-160 MG PER TABLET    Take 1 tablet by mouth once daily.       Start Date: 7/11/2023 End Date: --    CIPROFLOXACIN HCL (CIPRO) 500 MG TABLET    Take 1 tablet (500 mg total) by mouth every 12 (twelve) hours. for 7 days       Start Date: 9/22/2023 End Date: 9/29/2023    DICLOFENAC (VOLTAREN) 75 MG EC TABLET    Take 1 tablet (75 mg total) by mouth 2 (two) times daily.       Start Date: 9/11/2023 End Date: --    FLUOXETINE 40 MG CAPSULE    Take 1 capsule (40 mg total) by mouth once daily.       Start Date: 7/11/2023 End Date: --    MUPIROCIN (BACTROBAN) 2 % OINTMENT    Apply topically 3 (three) times daily.       Start Date: 8/10/2023 End Date: --    PANTOPRAZOLE (PROTONIX) 40 MG TABLET    Take 1 tablet (40 mg total) by mouth 2 (two) times daily.       Start Date: 7/11/2023 End Date: 10/9/2023    PHENAZOPYRIDINE (PYRIDIUM) 100 MG TABLET    Take 1 tablet (100 mg total) by mouth 3 (three) times daily as needed for Pain.       Start Date: 9/22/2023 End Date: 10/2/2023    PHENTERMINE (ADIPEX-P) 37.5 MG TABLET    Take 1 tablet (37.5 mg total) by mouth before breakfast.       Start Date: 8/11/2023 End Date: --    PROPRANOLOL (INDERAL) 10 MG TABLET    Take 1 tablet (10 mg total) by mouth daily as needed.       Start Date: 7/11/2023 End Date: --   Discontinued Medications    VYVANSE 50 MG  CAPSULE    Take 1 capsule (50 mg total) by mouth every morning.       Start Date: 9/6/2023  End Date: 9/29/2023          Follow up if symptoms worsen or fail to improve, for keep next appt. In addition to their scheduled follow up, the patient has also been instructed to follow up on as needed basis.

## 2023-09-22 DIAGNOSIS — N30.00 ACUTE CYSTITIS WITHOUT HEMATURIA: Primary | ICD-10-CM

## 2023-09-22 DIAGNOSIS — R10.84 GENERALIZED ABDOMINAL PAIN: ICD-10-CM

## 2023-09-22 LAB
C3 SERPL-MCNC: 148 MG/DL
C4 SERPL-MCNC: 18 MG/DL
NUCLEAR IGG SER QL IA: NORMAL
RHEUMATOID FACT SERPL-ACNC: <10 IU/ML

## 2023-09-22 PROCEDURE — 87088 URINE BACTERIA CULTURE: CPT | Performed by: STUDENT IN AN ORGANIZED HEALTH CARE EDUCATION/TRAINING PROGRAM

## 2023-09-22 RX ORDER — CIPROFLOXACIN 500 MG/1
500 TABLET ORAL EVERY 12 HOURS
Qty: 14 TABLET | Refills: 0 | Status: SHIPPED | OUTPATIENT
Start: 2023-09-22 | End: 2023-09-29

## 2023-09-22 RX ORDER — PHENAZOPYRIDINE HYDROCHLORIDE 100 MG/1
100 TABLET, FILM COATED ORAL 3 TIMES DAILY PRN
Qty: 30 TABLET | Refills: 0 | Status: SHIPPED | OUTPATIENT
Start: 2023-09-22 | End: 2023-10-02

## 2023-09-22 NOTE — PROGRESS NOTES
Patient came to office for urine culture.   Start Ciprofloxacin bid x 7d  Pyridium for spasms  Obtain CT abdomen and pelvis  F/u with culture

## 2023-09-24 LAB
BACTERIA UR CULT: NORMAL
CYCLIC CITRULLINATED PEPTIDE (CCP) (OHS): 1.1 U/ML

## 2023-09-29 ENCOUNTER — OFFICE VISIT (OUTPATIENT)
Dept: PRIMARY CARE CLINIC | Facility: CLINIC | Age: 53
End: 2023-09-29
Payer: COMMERCIAL

## 2023-09-29 ENCOUNTER — HOSPITAL ENCOUNTER (OUTPATIENT)
Dept: RADIOLOGY | Facility: HOSPITAL | Age: 53
Discharge: HOME | End: 2023-09-29
Attending: STUDENT IN AN ORGANIZED HEALTH CARE EDUCATION/TRAINING PROGRAM
Payer: COMMERCIAL

## 2023-09-29 VITALS
HEART RATE: 85 BPM | WEIGHT: 234.81 LBS | DIASTOLIC BLOOD PRESSURE: 81 MMHG | SYSTOLIC BLOOD PRESSURE: 135 MMHG | OXYGEN SATURATION: 98 % | TEMPERATURE: 99 F | BODY MASS INDEX: 39.12 KG/M2 | HEIGHT: 65 IN

## 2023-09-29 DIAGNOSIS — N30.00 ACUTE CYSTITIS WITHOUT HEMATURIA: ICD-10-CM

## 2023-09-29 DIAGNOSIS — F98.8 ATTENTION DEFICIT DISORDER, UNSPECIFIED HYPERACTIVITY PRESENCE: Primary | ICD-10-CM

## 2023-09-29 DIAGNOSIS — R63.2 BINGE EATING: ICD-10-CM

## 2023-09-29 DIAGNOSIS — R10.84 GENERALIZED ABDOMINAL PAIN: ICD-10-CM

## 2023-09-29 PROCEDURE — 3008F PR BODY MASS INDEX (BMI) DOCUMENTED: ICD-10-PCS | Mod: CPTII,,, | Performed by: STUDENT IN AN ORGANIZED HEALTH CARE EDUCATION/TRAINING PROGRAM

## 2023-09-29 PROCEDURE — 4010F PR ACE/ARB THEARPY RXD/TAKEN: ICD-10-PCS | Mod: CPTII,,, | Performed by: STUDENT IN AN ORGANIZED HEALTH CARE EDUCATION/TRAINING PROGRAM

## 2023-09-29 PROCEDURE — 1160F PR REVIEW ALL MEDS BY PRESCRIBER/CLIN PHARMACIST DOCUMENTED: ICD-10-PCS | Mod: CPTII,,, | Performed by: STUDENT IN AN ORGANIZED HEALTH CARE EDUCATION/TRAINING PROGRAM

## 2023-09-29 PROCEDURE — 99214 PR OFFICE/OUTPT VISIT, EST, LEVL IV, 30-39 MIN: ICD-10-PCS | Mod: ,,, | Performed by: STUDENT IN AN ORGANIZED HEALTH CARE EDUCATION/TRAINING PROGRAM

## 2023-09-29 PROCEDURE — 3079F PR MOST RECENT DIASTOLIC BLOOD PRESSURE 80-89 MM HG: ICD-10-PCS | Mod: CPTII,,, | Performed by: STUDENT IN AN ORGANIZED HEALTH CARE EDUCATION/TRAINING PROGRAM

## 2023-09-29 PROCEDURE — 3075F SYST BP GE 130 - 139MM HG: CPT | Mod: CPTII,,, | Performed by: STUDENT IN AN ORGANIZED HEALTH CARE EDUCATION/TRAINING PROGRAM

## 2023-09-29 PROCEDURE — 3075F PR MOST RECENT SYSTOLIC BLOOD PRESS GE 130-139MM HG: ICD-10-PCS | Mod: CPTII,,, | Performed by: STUDENT IN AN ORGANIZED HEALTH CARE EDUCATION/TRAINING PROGRAM

## 2023-09-29 PROCEDURE — 99214 OFFICE O/P EST MOD 30 MIN: CPT | Mod: ,,, | Performed by: STUDENT IN AN ORGANIZED HEALTH CARE EDUCATION/TRAINING PROGRAM

## 2023-09-29 PROCEDURE — 1159F MED LIST DOCD IN RCRD: CPT | Mod: CPTII,,, | Performed by: STUDENT IN AN ORGANIZED HEALTH CARE EDUCATION/TRAINING PROGRAM

## 2023-09-29 PROCEDURE — 74178 CT ABD&PLV WO CNTR FLWD CNTR: CPT | Mod: TC

## 2023-09-29 PROCEDURE — 3008F BODY MASS INDEX DOCD: CPT | Mod: CPTII,,, | Performed by: STUDENT IN AN ORGANIZED HEALTH CARE EDUCATION/TRAINING PROGRAM

## 2023-09-29 PROCEDURE — 3079F DIAST BP 80-89 MM HG: CPT | Mod: CPTII,,, | Performed by: STUDENT IN AN ORGANIZED HEALTH CARE EDUCATION/TRAINING PROGRAM

## 2023-09-29 PROCEDURE — 1160F RVW MEDS BY RX/DR IN RCRD: CPT | Mod: CPTII,,, | Performed by: STUDENT IN AN ORGANIZED HEALTH CARE EDUCATION/TRAINING PROGRAM

## 2023-09-29 PROCEDURE — 25500020 PHARM REV CODE 255: Performed by: STUDENT IN AN ORGANIZED HEALTH CARE EDUCATION/TRAINING PROGRAM

## 2023-09-29 PROCEDURE — 4010F ACE/ARB THERAPY RXD/TAKEN: CPT | Mod: CPTII,,, | Performed by: STUDENT IN AN ORGANIZED HEALTH CARE EDUCATION/TRAINING PROGRAM

## 2023-09-29 PROCEDURE — 1159F PR MEDICATION LIST DOCUMENTED IN MEDICAL RECORD: ICD-10-PCS | Mod: CPTII,,, | Performed by: STUDENT IN AN ORGANIZED HEALTH CARE EDUCATION/TRAINING PROGRAM

## 2023-09-29 RX ORDER — LISDEXAMFETAMINE DIMESYLATE 70 MG/1
70 CAPSULE ORAL EVERY MORNING
Qty: 30 CAPSULE | Refills: 0 | Status: SHIPPED | OUTPATIENT
Start: 2023-10-06 | End: 2023-10-04

## 2023-09-29 RX ORDER — LISDEXAMFETAMINE DIMESYLATE 70 MG/1
70 CAPSULE ORAL EVERY MORNING
Qty: 30 CAPSULE | Refills: 0 | Status: SHIPPED | OUTPATIENT
Start: 2023-11-06 | End: 2023-10-04

## 2023-09-29 RX ADMIN — IOPAMIDOL 100 ML: 755 INJECTION, SOLUTION INTRAVENOUS at 08:09

## 2023-09-29 NOTE — PROGRESS NOTES
CT abd/pelvis showed no acute process but fatty infiltration of liver. We will f/u at next appointment      Thank you,    Dr. ALEXANDRA

## 2023-10-03 ENCOUNTER — PATIENT MESSAGE (OUTPATIENT)
Dept: PRIMARY CARE CLINIC | Facility: CLINIC | Age: 53
End: 2023-10-03
Payer: COMMERCIAL

## 2023-10-04 DIAGNOSIS — F98.8 ATTENTION DEFICIT DISORDER, UNSPECIFIED HYPERACTIVITY PRESENCE: Primary | ICD-10-CM

## 2023-10-04 RX ORDER — LISDEXAMFETAMINE DIMESYLATE 50 MG/1
50 CAPSULE ORAL EVERY MORNING
Qty: 30 CAPSULE | Refills: 0 | Status: SHIPPED | OUTPATIENT
Start: 2023-10-04 | End: 2023-10-22

## 2023-10-04 NOTE — TELEPHONE ENCOUNTER
Please inform patient that medication was returned back to 50mg qd. Thanks     Thank you,    Dr. Guerra

## 2023-10-18 ENCOUNTER — PATIENT MESSAGE (OUTPATIENT)
Dept: PRIMARY CARE CLINIC | Facility: CLINIC | Age: 53
End: 2023-10-18
Payer: COMMERCIAL

## 2023-10-18 DIAGNOSIS — R63.2 BINGE EATING: ICD-10-CM

## 2023-10-18 DIAGNOSIS — I10 PRIMARY HYPERTENSION: ICD-10-CM

## 2023-10-18 DIAGNOSIS — F98.8 ATTENTION DEFICIT DISORDER, UNSPECIFIED HYPERACTIVITY PRESENCE: Primary | ICD-10-CM

## 2023-10-22 RX ORDER — LISDEXAMFETAMINE DIMESYLATE 50 MG/1
50 CAPSULE ORAL EVERY MORNING
Qty: 30 CAPSULE | Refills: 0 | Status: SHIPPED | OUTPATIENT
Start: 2023-10-22 | End: 2023-10-24 | Stop reason: SDUPTHER

## 2023-10-24 DIAGNOSIS — R63.2 BINGE EATING: ICD-10-CM

## 2023-10-24 DIAGNOSIS — F98.8 ATTENTION DEFICIT DISORDER, UNSPECIFIED HYPERACTIVITY PRESENCE: ICD-10-CM

## 2023-10-24 RX ORDER — LISDEXAMFETAMINE DIMESYLATE 50 MG/1
50 CAPSULE ORAL EVERY MORNING
Qty: 30 CAPSULE | Refills: 0 | Status: SHIPPED | OUTPATIENT
Start: 2023-10-24 | End: 2023-11-10 | Stop reason: SDUPTHER

## 2023-10-31 PROBLEM — E66.01 SEVERE OBESITY (BMI 35.0-39.9) WITH COMORBIDITY: Status: ACTIVE | Noted: 2023-07-11

## 2023-10-31 NOTE — PROGRESS NOTES
Date: 11/10/2023  Patient ID: [id number]   Chief Complaint: Follow-up    HPI:   Jadyn Wilder is a 53 y.o. female here today for Follow-up  Last visit Vyvanse was attempted to 70 mg, but patient did not want to increase so soon, so it was decreased to 50 mg. Patient has lost weight. Patient was unable to refill for a couple weeks due to pharmacy issues, which caused dramatic change in daily life (fatigue, lack of motivation). Adipex has helped with weight loss, and patient has been tolerating without unwanted side effects.     Past Medical History:   Diagnosis Date    ADD (attention deficit disorder)     Anxiety     and depression    Binge eating 9/29/2023    Closed trimalleolar fracture of right ankle with routine healing 06/22/2022    Finger pain, right 06/18/2023    Hypertension     right finger 06/18/2023    Screening for malignant neoplasm of colon     Segmental and somatic dysfunction     Spondylosis of cervical region without myelopathy or radiculopathy     Vitamin D deficiency 07/11/2023     Outpatient Medications Marked as Taking for the 11/10/23 encounter (Office Visit) with Jazmín Guerra MD   Medication Sig Dispense Refill    amlodipine-valsartan (EXFORGE) 5-160 mg per tablet Take 1 tablet by mouth once daily. 90 tablet 3    diclofenac (VOLTAREN) 75 MG EC tablet Take 1 tablet (75 mg total) by mouth 2 (two) times daily. 60 tablet 11    FLUoxetine 40 MG capsule Take 1 capsule (40 mg total) by mouth once daily. 910 capsule 3    mupirocin (BACTROBAN) 2 % ointment Apply topically 3 (three) times daily. 30 g 1    propranoloL (INDERAL) 10 MG tablet Take 1 tablet (10 mg total) by mouth daily as needed. 30 tablet 11    [DISCONTINUED] lisdexamfetamine (VYVANSE) 50 MG capsule Take 1 capsule (50 mg total) by mouth every morning. 30 capsule 0    [DISCONTINUED] phentermine (ADIPEX-P) 37.5 mg tablet Take 1 tablet (37.5 mg total) by mouth before breakfast. 30 tablet 2     Past Surgical History:   Procedure Laterality  "Date    KNEE ARTHROSCOPY      OPEN REDUCTION AND INTERNAL FIXATION (ORIF) OF INJURY OF ANKLE Right 06/23/2022    Procedure: ORIF, ANKLE-will start prone-Synthes;  Surgeon: Ezekiel Emery Jr., MD;  Location: [facility];  Service: Orthopedics;  Laterality: Right;    TUBAL LIGATION       Review of patient's allergies indicates:  No Known Allergies  Family History   Problem Relation Age of Onset    Diabetes Mother     Hypertension Father     Skin cancer Father     No Known Problems Sister     No Known Problems Brother     Colon cancer Maternal Grandmother     Pancreatic cancer Maternal Grandfather       Social History     Socioeconomic History    Marital status: Significant Other   Occupational History    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   Tobacco Use    Smoking status: Former     Types: Cigarettes     Passive exposure: Never    Smokeless tobacco: Never    Tobacco comments:     Quit >15yrs ago   Substance and Sexual Activity    Alcohol use: Not Currently     Alcohol/week: 1.0 standard drink of alcohol     Types: 1 Glasses of wine per week    Drug use: Never    Sexual activity: Yes     Partners: Male     Birth control/protection: See Surgical Hx     Comment: fiance   Social History Narrative    Has granddaughter 5mos olf     Patient Care Team:  Jazmín Guerra MD as PCP - General (Family Medicine)  Nida Waldrop MD as Hypertension Digital Medicine Responsible Provider (Family Medicine)  Robin Montiel as Digital Medicine Health   Maryuri Andersen PharmD as Hypertension Digital Medicine Clinician (Pharmacist)  1, [facility] [employer] [facility] as Hypertension Digital Medicine Contract   Subjective:   ROS  See HPI for details  All Other ROS: Negative except as stated in HPI.   Objective:   /82   Pulse 82   Temp 98.6 °F (37 °C)   Ht 5' 5" (1.651 m)   Wt 104.1 kg (229 lb 9.6 oz)   LMP  (LMP Unknown)   SpO2 98%   BMI 38.21 kg/m²   Physical Exam  General: NAD  Eye: EOMI  HENT: no nasal discharge  CV: " RRR  Respiratory: non-labored breathing  Musculoskeletal: ambulates independently. No obvious deformity  Integumentary: no apparent discoloration  Neurologic: Alert, oriented to person and situation  Cognition and Speech: Speech clear and coherent.   Psychiatric: Cooperative  Assessment:       ICD-10-CM ICD-9-CM   1. Attention deficit disorder, unspecified hyperactivity presence  F98.8 314.00   2. Severe obesity (BMI 35.0-39.9) with comorbidity  E66.01 278.01      Plan:   1. Attention deficit disorder, unspecified hyperactivity presence  Overview:  Controlled  Continue Vyvanse to 50 mg daily.  AE discussed  Controlled substance agreement signed today  /narx care checked without discrepancies noted  3-month medication refills given and printed for patient  Quarterly visits required for continued prescriptions with minimum annual wellness exam  Discussed adverse effects that include but not limited to cardiovascular event, stroke, heart attack, death      Orders:  -     lisdexamfetamine (VYVANSE) 50 MG capsule; Take 1 capsule (50 mg total) by mouth every morning.  Dispense: 30 capsule; Refill: 0  -     lisdexamfetamine (VYVANSE) 50 MG capsule; Take 1 capsule (50 mg total) by mouth every morning.  Dispense: 30 capsule; Refill: 0  -     lisdexamfetamine (VYVANSE) 50 MG capsule; Take 1 capsule (50 mg total) by mouth every morning.  Dispense: 30 capsule; Refill: 0    2. Severe obesity (BMI 35.0-39.9) with comorbidity  Overview:  Body mass index is 38.89 kg/m².  Consider bariatric surgery if BMI >40 or >35 with comorbidities. Referral sent and appt pending  Continue Adipex  Recommend intensive, multicomponent, behavioral interventions:  Goal BMI <30.  Goal is to exercise at least 5 times a week for 30 minutes per day.   -Stand more each day.   -Increase exercise: Start with 10 minutes daily and build up to 60-90 minutes/day with moderate activity  Reduce caloric intake:  -Women: 2183-1271 kcal/day  -Men: 5513-0931  kcal/day  Avoid soda, simple sugars, excessive rice, potatoes or bread. Limit fast foods and fried foods.  Choose complex carbs in moderation (example: green vegetables, beans, oatmeal). Eat plenty of fresh fruits and vegetables with lean meats daily.  Do not skip meals. Eat a balanced portion size.  Avoid fad diets. Consider long-term healthy life style changes.       Orders:  -     phentermine (ADIPEX-P) 37.5 mg tablet; Take 1 tablet (37.5 mg total) by mouth before breakfast.  Dispense: 30 tablet; Refill: 2         Medication List with Changes/Refills   New Medications    LISDEXAMFETAMINE (VYVANSE) 50 MG CAPSULE    Take 1 capsule (50 mg total) by mouth every morning.       Start Date: 12/10/2023End Date: --    LISDEXAMFETAMINE (VYVANSE) 50 MG CAPSULE    Take 1 capsule (50 mg total) by mouth every morning.       Start Date: 1/10/2024 End Date: --   Current Medications    AMLODIPINE-VALSARTAN (EXFORGE) 5-160 MG PER TABLET    Take 1 tablet by mouth once daily.       Start Date: 7/11/2023 End Date: --    DICLOFENAC (VOLTAREN) 75 MG EC TABLET    Take 1 tablet (75 mg total) by mouth 2 (two) times daily.       Start Date: 9/11/2023 End Date: --    FLUOXETINE 40 MG CAPSULE    Take 1 capsule (40 mg total) by mouth once daily.       Start Date: 7/11/2023 End Date: --    MUPIROCIN (BACTROBAN) 2 % OINTMENT    Apply topically 3 (three) times daily.       Start Date: 8/10/2023 End Date: --    PANTOPRAZOLE (PROTONIX) 40 MG TABLET    Take 1 tablet (40 mg total) by mouth 2 (two) times daily.       Start Date: 7/11/2023 End Date: 10/9/2023    PROPRANOLOL (INDERAL) 10 MG TABLET    Take 1 tablet (10 mg total) by mouth daily as needed.       Start Date: 7/11/2023 End Date: --   Changed and/or Refilled Medications    Modified Medication Previous Medication    LISDEXAMFETAMINE (VYVANSE) 50 MG CAPSULE lisdexamfetamine (VYVANSE) 50 MG capsule       Take 1 capsule (50 mg total) by mouth every morning.    Take 1 capsule (50 mg total) by  mouth every morning.       Start Date: 11/10/2023End Date: --    Start Date: 10/24/2023End Date: 11/10/2023    PHENTERMINE (ADIPEX-P) 37.5 MG TABLET phentermine (ADIPEX-P) 37.5 mg tablet       Take 1 tablet (37.5 mg total) by mouth before breakfast.    Take 1 tablet (37.5 mg total) by mouth before breakfast.       Start Date: 11/10/2023End Date: --    Start Date: 8/11/2023 End Date: 11/10/2023        Follow up in about 3 months (around 2/10/2024) for med refill, Weight loss. In addition to their scheduled follow up, the patient has also been instructed to follow up on as needed basis.

## 2023-11-10 ENCOUNTER — OFFICE VISIT (OUTPATIENT)
Dept: PRIMARY CARE CLINIC | Facility: CLINIC | Age: 53
End: 2023-11-10
Payer: COMMERCIAL

## 2023-11-10 VITALS
TEMPERATURE: 99 F | WEIGHT: 229.63 LBS | HEART RATE: 82 BPM | SYSTOLIC BLOOD PRESSURE: 120 MMHG | HEIGHT: 65 IN | DIASTOLIC BLOOD PRESSURE: 82 MMHG | OXYGEN SATURATION: 98 % | BODY MASS INDEX: 38.26 KG/M2

## 2023-11-10 DIAGNOSIS — E66.01 SEVERE OBESITY (BMI 35.0-39.9) WITH COMORBIDITY: ICD-10-CM

## 2023-11-10 DIAGNOSIS — F98.8 ATTENTION DEFICIT DISORDER, UNSPECIFIED HYPERACTIVITY PRESENCE: Primary | ICD-10-CM

## 2023-11-10 PROCEDURE — 4010F ACE/ARB THERAPY RXD/TAKEN: CPT | Mod: CPTII,,, | Performed by: STUDENT IN AN ORGANIZED HEALTH CARE EDUCATION/TRAINING PROGRAM

## 2023-11-10 PROCEDURE — 1159F MED LIST DOCD IN RCRD: CPT | Mod: CPTII,,, | Performed by: STUDENT IN AN ORGANIZED HEALTH CARE EDUCATION/TRAINING PROGRAM

## 2023-11-10 PROCEDURE — 99214 PR OFFICE/OUTPT VISIT, EST, LEVL IV, 30-39 MIN: ICD-10-PCS | Mod: ,,, | Performed by: STUDENT IN AN ORGANIZED HEALTH CARE EDUCATION/TRAINING PROGRAM

## 2023-11-10 PROCEDURE — 3079F PR MOST RECENT DIASTOLIC BLOOD PRESSURE 80-89 MM HG: ICD-10-PCS | Mod: CPTII,,, | Performed by: STUDENT IN AN ORGANIZED HEALTH CARE EDUCATION/TRAINING PROGRAM

## 2023-11-10 PROCEDURE — 3074F PR MOST RECENT SYSTOLIC BLOOD PRESSURE < 130 MM HG: ICD-10-PCS | Mod: CPTII,,, | Performed by: STUDENT IN AN ORGANIZED HEALTH CARE EDUCATION/TRAINING PROGRAM

## 2023-11-10 PROCEDURE — 3008F PR BODY MASS INDEX (BMI) DOCUMENTED: ICD-10-PCS | Mod: CPTII,,, | Performed by: STUDENT IN AN ORGANIZED HEALTH CARE EDUCATION/TRAINING PROGRAM

## 2023-11-10 PROCEDURE — 3079F DIAST BP 80-89 MM HG: CPT | Mod: CPTII,,, | Performed by: STUDENT IN AN ORGANIZED HEALTH CARE EDUCATION/TRAINING PROGRAM

## 2023-11-10 PROCEDURE — 3008F BODY MASS INDEX DOCD: CPT | Mod: CPTII,,, | Performed by: STUDENT IN AN ORGANIZED HEALTH CARE EDUCATION/TRAINING PROGRAM

## 2023-11-10 PROCEDURE — 99214 OFFICE O/P EST MOD 30 MIN: CPT | Mod: ,,, | Performed by: STUDENT IN AN ORGANIZED HEALTH CARE EDUCATION/TRAINING PROGRAM

## 2023-11-10 PROCEDURE — 1160F RVW MEDS BY RX/DR IN RCRD: CPT | Mod: CPTII,,, | Performed by: STUDENT IN AN ORGANIZED HEALTH CARE EDUCATION/TRAINING PROGRAM

## 2023-11-10 PROCEDURE — 1159F PR MEDICATION LIST DOCUMENTED IN MEDICAL RECORD: ICD-10-PCS | Mod: CPTII,,, | Performed by: STUDENT IN AN ORGANIZED HEALTH CARE EDUCATION/TRAINING PROGRAM

## 2023-11-10 PROCEDURE — 1160F PR REVIEW ALL MEDS BY PRESCRIBER/CLIN PHARMACIST DOCUMENTED: ICD-10-PCS | Mod: CPTII,,, | Performed by: STUDENT IN AN ORGANIZED HEALTH CARE EDUCATION/TRAINING PROGRAM

## 2023-11-10 PROCEDURE — 4010F PR ACE/ARB THEARPY RXD/TAKEN: ICD-10-PCS | Mod: CPTII,,, | Performed by: STUDENT IN AN ORGANIZED HEALTH CARE EDUCATION/TRAINING PROGRAM

## 2023-11-10 PROCEDURE — 3074F SYST BP LT 130 MM HG: CPT | Mod: CPTII,,, | Performed by: STUDENT IN AN ORGANIZED HEALTH CARE EDUCATION/TRAINING PROGRAM

## 2023-11-10 RX ORDER — LISDEXAMFETAMINE DIMESYLATE 50 MG/1
50 CAPSULE ORAL EVERY MORNING
Qty: 30 CAPSULE | Refills: 0 | Status: SHIPPED | OUTPATIENT
Start: 2024-01-10 | End: 2024-02-09 | Stop reason: SDUPTHER

## 2023-11-10 RX ORDER — LISDEXAMFETAMINE DIMESYLATE 50 MG/1
50 CAPSULE ORAL EVERY MORNING
Qty: 30 CAPSULE | Refills: 0 | Status: SHIPPED | OUTPATIENT
Start: 2023-12-10

## 2023-11-10 RX ORDER — LISDEXAMFETAMINE DIMESYLATE 50 MG/1
50 CAPSULE ORAL EVERY MORNING
Qty: 30 CAPSULE | Refills: 0 | Status: SHIPPED | OUTPATIENT
Start: 2023-11-10

## 2023-11-10 RX ORDER — PHENTERMINE HYDROCHLORIDE 37.5 MG/1
37.5 TABLET ORAL
Qty: 30 TABLET | Refills: 2 | Status: SHIPPED | OUTPATIENT
Start: 2023-11-10

## 2023-11-13 PROBLEM — Z00.00 WELLNESS EXAMINATION: Status: RESOLVED | Noted: 2022-10-31 | Resolved: 2023-11-13

## 2023-12-06 ENCOUNTER — PATIENT MESSAGE (OUTPATIENT)
Dept: PRIMARY CARE CLINIC | Facility: CLINIC | Age: 53
End: 2023-12-06
Payer: COMMERCIAL

## 2023-12-06 NOTE — TELEPHONE ENCOUNTER
Please inform patient to apply warm compress 2-3x a day to let it drain on its own. If it does not get better in the next 3-5days please have her set up appt or go to urgent care    Thank you,    Dr. Guerra

## 2023-12-11 ENCOUNTER — PATIENT MESSAGE (OUTPATIENT)
Dept: ADMINISTRATIVE | Facility: HOSPITAL | Age: 53
End: 2023-12-11
Payer: COMMERCIAL

## 2023-12-13 ENCOUNTER — PATIENT OUTREACH (OUTPATIENT)
Dept: ADMINISTRATIVE | Facility: HOSPITAL | Age: 53
End: 2023-12-13
Payer: COMMERCIAL

## 2023-12-13 DIAGNOSIS — Z12.31 ENCOUNTER FOR SCREENING MAMMOGRAM FOR MALIGNANT NEOPLASM OF BREAST: Primary | ICD-10-CM

## 2023-12-14 ENCOUNTER — HOSPITAL ENCOUNTER (OUTPATIENT)
Dept: RADIOLOGY | Facility: HOSPITAL | Age: 53
Discharge: HOME | End: 2023-12-14
Attending: STUDENT IN AN ORGANIZED HEALTH CARE EDUCATION/TRAINING PROGRAM
Payer: COMMERCIAL

## 2023-12-14 DIAGNOSIS — Z12.31 ENCOUNTER FOR SCREENING MAMMOGRAM FOR MALIGNANT NEOPLASM OF BREAST: ICD-10-CM

## 2023-12-14 PROCEDURE — 77067 SCR MAMMO BI INCL CAD: CPT | Mod: 26,,, | Performed by: STUDENT IN AN ORGANIZED HEALTH CARE EDUCATION/TRAINING PROGRAM

## 2023-12-14 PROCEDURE — 77063 BREAST TOMOSYNTHESIS BI: CPT | Mod: 26,,, | Performed by: STUDENT IN AN ORGANIZED HEALTH CARE EDUCATION/TRAINING PROGRAM

## 2023-12-14 PROCEDURE — 77067 SCR MAMMO BI INCL CAD: CPT | Mod: TC

## 2023-12-14 PROCEDURE — 77067 MAMMO DIGITAL SCREENING BILAT WITH TOMO: ICD-10-PCS | Mod: 26,,, | Performed by: STUDENT IN AN ORGANIZED HEALTH CARE EDUCATION/TRAINING PROGRAM

## 2023-12-14 PROCEDURE — 77063 MAMMO DIGITAL SCREENING BILAT WITH TOMO: ICD-10-PCS | Mod: 26,,, | Performed by: STUDENT IN AN ORGANIZED HEALTH CARE EDUCATION/TRAINING PROGRAM

## 2023-12-18 ENCOUNTER — OFFICE VISIT (OUTPATIENT)
Dept: URGENT CARE | Facility: CLINIC | Age: 53
End: 2023-12-18
Payer: COMMERCIAL

## 2023-12-18 VITALS
TEMPERATURE: 100 F | SYSTOLIC BLOOD PRESSURE: 114 MMHG | RESPIRATION RATE: 16 BRPM | OXYGEN SATURATION: 97 % | HEART RATE: 93 BPM | WEIGHT: 239 LBS | HEIGHT: 65 IN | DIASTOLIC BLOOD PRESSURE: 81 MMHG | BODY MASS INDEX: 39.82 KG/M2

## 2023-12-18 DIAGNOSIS — N39.0 ACUTE UTI: ICD-10-CM

## 2023-12-18 DIAGNOSIS — U07.1 COVID-19 VIRUS DETECTED: ICD-10-CM

## 2023-12-18 DIAGNOSIS — R35.0 URINARY FREQUENCY: ICD-10-CM

## 2023-12-18 DIAGNOSIS — R52 BODY ACHES: Primary | ICD-10-CM

## 2023-12-18 DIAGNOSIS — U07.1 COVID: ICD-10-CM

## 2023-12-18 LAB
BILIRUB UR QL STRIP: POSITIVE
CTP QC/QA: YES
CTP QC/QA: YES
GLUCOSE UR QL STRIP: NEGATIVE
KETONES UR QL STRIP: NEGATIVE
LEUKOCYTE ESTERASE UR QL STRIP: NEGATIVE
PH, POC UA: 5
POC BLOOD, URINE: NEGATIVE
POC MOLECULAR INFLUENZA A AGN: NEGATIVE
POC MOLECULAR INFLUENZA B AGN: NEGATIVE
POC NITRATES, URINE: NEGATIVE
PROT UR QL STRIP: POSITIVE
SARS-COV-2 RDRP RESP QL NAA+PROBE: POSITIVE
SP GR UR STRIP: 1.02 (ref 1–1.03)
UROBILINOGEN UR STRIP-ACNC: 8 (ref 0.1–1.1)

## 2023-12-18 PROCEDURE — 81003 URINALYSIS AUTO W/O SCOPE: CPT | Mod: QW,,, | Performed by: PHYSICIAN ASSISTANT

## 2023-12-18 PROCEDURE — 87502 POCT INFLUENZA A/B MOLECULAR: ICD-10-PCS | Mod: QW,,, | Performed by: PHYSICIAN ASSISTANT

## 2023-12-18 PROCEDURE — 87086 URINE CULTURE/COLONY COUNT: CPT | Performed by: PHYSICIAN ASSISTANT

## 2023-12-18 PROCEDURE — 99214 PR OFFICE/OUTPT VISIT, EST, LEVL IV, 30-39 MIN: ICD-10-PCS | Mod: 25,,, | Performed by: PHYSICIAN ASSISTANT

## 2023-12-18 PROCEDURE — 81003 POCT URINALYSIS, DIPSTICK, AUTOMATED, W/O SCOPE: ICD-10-PCS | Mod: QW,,, | Performed by: PHYSICIAN ASSISTANT

## 2023-12-18 PROCEDURE — 96372 PR INJECTION,THERAP/PROPH/DIAG2ST, IM OR SUBCUT: ICD-10-PCS | Mod: ,,, | Performed by: PHYSICIAN ASSISTANT

## 2023-12-18 PROCEDURE — 87502 INFLUENZA DNA AMP PROBE: CPT | Mod: QW,,, | Performed by: PHYSICIAN ASSISTANT

## 2023-12-18 PROCEDURE — 87635 SARS-COV-2 COVID-19 AMP PRB: CPT | Mod: QW,,, | Performed by: PHYSICIAN ASSISTANT

## 2023-12-18 PROCEDURE — 99214 OFFICE O/P EST MOD 30 MIN: CPT | Mod: 25,,, | Performed by: PHYSICIAN ASSISTANT

## 2023-12-18 PROCEDURE — 96372 THER/PROPH/DIAG INJ SC/IM: CPT | Mod: ,,, | Performed by: PHYSICIAN ASSISTANT

## 2023-12-18 PROCEDURE — 87635: ICD-10-PCS | Mod: QW,,, | Performed by: PHYSICIAN ASSISTANT

## 2023-12-18 RX ORDER — PROMETHAZINE HYDROCHLORIDE AND DEXTROMETHORPHAN HYDROBROMIDE 6.25; 15 MG/5ML; MG/5ML
5 SYRUP ORAL EVERY 8 HOURS PRN
Qty: 118 ML | Refills: 0 | Status: SHIPPED | OUTPATIENT
Start: 2023-12-18 | End: 2023-12-23

## 2023-12-18 RX ORDER — DEXAMETHASONE SODIUM PHOSPHATE 100 MG/10ML
10 INJECTION INTRAMUSCULAR; INTRAVENOUS
Status: COMPLETED | OUTPATIENT
Start: 2023-12-18 | End: 2023-12-18

## 2023-12-18 RX ORDER — PREDNISONE 5 MG/1
5 TABLET ORAL DAILY
Qty: 5 TABLET | Refills: 0 | Status: SHIPPED | OUTPATIENT
Start: 2023-12-18 | End: 2023-12-23

## 2023-12-18 RX ORDER — NITROFURANTOIN 25; 75 MG/1; MG/1
100 CAPSULE ORAL 2 TIMES DAILY
Qty: 20 CAPSULE | Refills: 0 | Status: SHIPPED | OUTPATIENT
Start: 2023-12-18 | End: 2023-12-28

## 2023-12-18 RX ADMIN — DEXAMETHASONE SODIUM PHOSPHATE 10 MG: 100 INJECTION INTRAMUSCULAR; INTRAVENOUS at 10:12

## 2023-12-18 NOTE — PROGRESS NOTES
"Subjective:      Patient ID: Jadyn Wilder is a 53 y.o. female.    Vitals:  height is 5' 5" (1.651 m) and weight is 108.4 kg (239 lb). Her temperature is 99.7 °F (37.6 °C). Her blood pressure is 114/81 and her pulse is 93. Her respiration is 16 and oxygen saturation is 97%.     Chief Complaint: Fever (101. fever, cough, nasal congestion, joint pain since Saturday. Tried taking theraflu) and Urinary Frequency (Frequency with urination since yesterday.)    HPI    Female reports in the last 2 days having acute URI symptoms cough cold congestion now with fever not improved with over-the-counter medication and currently hydrating with orange juice which reports now having urinary frequency concern for bladder infection.   Fever     Additional comments: 101. fever, cough, nasal congestion, joint pain   since Saturday. Tried taking theraflu           Urinary Frequency     Additional comments: Frequency with urination since yesterday.    Fever   This is a new problem. Associated symptoms include congestion and coughing. Pertinent negatives include no diarrhea, ear pain, headaches, nausea, sore throat, urinary pain, vomiting or wheezing.   Urinary Frequency   Associated symptoms include frequency and urgency. Pertinent negatives include no flank pain, hematuria, nausea or vomiting.       Constitution: Positive for fatigue and fever.   HENT:  Positive for congestion, postnasal drip and sinus pressure. Negative for ear pain, sinus pain, sore throat, trouble swallowing and voice change.    Neck: Negative for neck pain and neck swelling.   Cardiovascular: Negative.    Respiratory:  Positive for cough. Negative for shortness of breath, stridor and wheezing.    Gastrointestinal:  Negative for nausea, vomiting and diarrhea.   Genitourinary:  Positive for frequency and urgency. Negative for dysuria, flank pain and hematuria.   Musculoskeletal:  Negative for back pain and muscle ache.   Skin: Negative.    Allergic/Immunologic: " Negative.    Neurological:  Negative for headaches.   Psychiatric/Behavioral:  Positive for sleep disturbance.       Objective:     Physical Exam   Constitutional: She is oriented to person, place, and time. She appears well-developed. She is cooperative.  Non-toxic appearance.      Comments:  Awake alert ambulatory nasally congested female   obesity  HENT:   Head: Normocephalic.   Ears:   Right Ear: Hearing, tympanic membrane, external ear and ear canal normal.   Left Ear: Hearing, tympanic membrane, external ear and ear canal normal.   Nose: Congestion present. No mucosal edema, rhinorrhea or nasal deformity. No epistaxis. Right sinus exhibits no maxillary sinus tenderness and no frontal sinus tenderness. Left sinus exhibits no maxillary sinus tenderness and no frontal sinus tenderness.   Mouth/Throat: Uvula is midline, oropharynx is clear and moist and mucous membranes are normal. Mucous membranes are moist. No trismus in the jaw. Normal dentition. No uvula swelling. No oropharyngeal exudate, posterior oropharyngeal edema or posterior oropharyngeal erythema.   Eyes: Conjunctivae and lids are normal. No scleral icterus.   Neck: Trachea normal and phonation normal. Neck supple. No edema present. No erythema present. No neck rigidity present.   Cardiovascular: Normal rate, regular rhythm, normal heart sounds and normal pulses.   Pulmonary/Chest: Effort normal and breath sounds normal. No stridor. No respiratory distress. She has no decreased breath sounds. She has no wheezes. She has no rhonchi. She has no rales.   Abdominal: Soft. There is no left CVA tenderness and no right CVA tenderness.   Musculoskeletal: Normal range of motion.         General: No deformity. Normal range of motion.      Cervical back: She exhibits no tenderness.   Lymphadenopathy:     She has no cervical adenopathy.   Neurological: no focal deficit. She is alert and oriented to person, place, and time. She exhibits normal muscle tone.  Coordination normal.   Skin: Skin is warm, dry, intact and not diaphoretic.   Psychiatric: Her mood appears anxious. Her speech is tangential.   Nursing note and vitals reviewed.       Previous History      Review of patient's allergies indicates:  No Known Allergies    Past Medical History:   Diagnosis Date    ADD (attention deficit disorder)     Anxiety     and depression    Binge eating 9/29/2023    Closed trimalleolar fracture of right ankle with routine healing 06/22/2022    Finger pain, right 06/18/2023    Hypertension     right finger 06/18/2023    Screening for malignant neoplasm of colon     Segmental and somatic dysfunction     Spondylosis of cervical region without myelopathy or radiculopathy     Vitamin D deficiency 07/11/2023     Current Outpatient Medications   Medication Instructions    amlodipine-valsartan (EXFORGE) 5-160 mg per tablet 1 tablet, Oral, Daily    diclofenac (VOLTAREN) 75 mg, Oral, 2 times daily    FLUoxetine 40 mg, Oral, Daily    lisdexamfetamine (VYVANSE) 50 mg, Oral, Every morning    lisdexamfetamine (VYVANSE) 50 mg, Oral, Every morning    [START ON 1/10/2024] lisdexamfetamine (VYVANSE) 50 mg, Oral, Every morning    molnupiravir 800 mg, Oral, Every 12 hours    mupirocin (BACTROBAN) 2 % ointment Topical (Top), 3 times daily    nitrofurantoin, macrocrystal-monohydrate, (MACROBID) 100 MG capsule 100 mg, Oral, 2 times daily    pantoprazole (PROTONIX) 40 mg, Oral, 2 times daily    phentermine (ADIPEX-P) 37.5 mg, Oral, Before breakfast    predniSONE (DELTASONE) 5 mg, Oral, Daily    promethazine-dextromethorphan (PROMETHAZINE-DM) 6.25-15 mg/5 mL Syrp 5 mLs, Oral, Every 8 hours PRN    propranoloL (INDERAL) 10 mg, Oral, Daily PRN     Past Surgical History:   Procedure Laterality Date    KNEE ARTHROSCOPY      OPEN REDUCTION AND INTERNAL FIXATION (ORIF) OF INJURY OF ANKLE Right 06/23/2022    Procedure: ORIF, ANKLE-will start prone-Synthes;  Surgeon: Ezekiel Emery Jr., MD;  Location: [facility] OR;   "Service: Orthopedics;  Laterality: Right;    TUBAL LIGATION       Family History   Problem Relation Age of Onset    Diabetes Mother     Hypertension Father     Skin cancer Father     No Known Problems Sister     No Known Problems Brother     Colon cancer Maternal Grandmother     Pancreatic cancer Maternal Grandfather        Social History     Tobacco Use    Smoking status: Former     Types: Cigarettes     Passive exposure: Never    Smokeless tobacco: Never    Tobacco comments:     Quit >15yrs ago   Substance Use Topics    Alcohol use: Not Currently     Alcohol/week: 1.0 standard drink of alcohol     Types: 1 Glasses of wine per week    Drug use: Never        Physical Exam      Vital Signs Reviewed   /81   Pulse 93   Temp 99.7 °F (37.6 °C)   Resp 16   Ht 5' 5" (1.651 m)   Wt 108.4 kg (239 lb)   LMP  (LMP Unknown)   SpO2 97%   BMI 39.77 kg/m²        Procedures    Procedures     Labs     Results for orders placed or performed in visit on 12/18/23   POCT Urinalysis, Dipstick, Automated, W/O Scope   Result Value Ref Range    POC Blood, Urine Negative Negative, Positive Slide, Positive Tube    POC Bilirubin, Urine Positive (A) Negative, Positive Slide, Positive Tube    POC Urobilinogen, Urine 8 (A) 0.1 - 1.1    POC Ketones, Urine Negative Negative, Positive Slide, Positive Tube    POC Protein, Urine Positive (A) Negative, Positive Slide, Positive Tube    POC Nitrates, Urine Negative Negative, Positive Slide, Positive Tube    POC Glucose, Urine Negative Negative, Positive Slide, Positive Tube    pH, UA 5     POC Specific Gravity, Urine 1.020 1.003 - 1.029    POC Leukocytes, Urine Negative Negative, Positive Slide, Positive Tube   POCT COVID-19 Rapid Screening   Result Value Ref Range    POC Rapid COVID Positive (A) Negative     Acceptable Yes    POCT Influenza A/B Molecular   Result Value Ref Range    POC Molecular Influenza A Ag Negative Negative, Not Reported    POC Molecular Influenza B Ag " Negative Negative, Not Reported     Acceptable Yes          Assessment:     1. Body aches    2. Urinary frequency    3. COVID    4. Acute UTI        Plan:   COVID Positive  Start multi vitamin daily. Current CDC guidelines recommend that you quarantine for 5 days starting the day after your symptoms began. Quarantine can end after 5 days as long as the last 24 hours of quarantine you are fever free and there is improvement of all your symptoms. Wear a mask around others for 5 additional days after quarantine. Treat your symptoms as you would the common cold. If you live with anybody, isolate yourself in a separate bedroom and use a separate bathroom. If your symptoms worsen or you develop shortness of breath or a fever over 103, seek medical attention immediately.    May start viral medication today to help reduce sick time.  Cover cough at all times washing sanitized hands and surfaces regularly to help prevent the spread of COVID viral infection.  Encourage plenty water and noncarbonated fluids hydration rest over the next 5-7 days.  Alternate Tylenol and ibuprofen every 4-6 hours if needed for aches pains fever or chills.  Phenergan DM sparingly lowest dose if needed for severe cough cold congestion body aches or rest.  Do not take sedating cough medication drive or operate machinery.  If urinary frequency persist may start Macrobid antibiotic coverage.  Recommend follow-up with primary Care physician in 1-2  weeks if not improving.    Body aches  -     POCT COVID-19 Rapid Screening  -     POCT Influenza A/B Molecular    Urinary frequency  -     POCT Urinalysis, Dipstick, Automated, W/O Scope    COVID    Acute UTI  -     Urine Culture High Risk    Other orders  -     Cancel: POCT COVID-19 Rapid Screening  -     Cancel: POCT Influenza A/B Molecular  -     dexAMETHasone injection 10 mg  -     nitrofurantoin, macrocrystal-monohydrate, (MACROBID) 100 MG capsule; Take 1 capsule (100 mg total) by mouth 2  (two) times daily. for 10 days  Dispense: 20 capsule; Refill: 0  -     promethazine-dextromethorphan (PROMETHAZINE-DM) 6.25-15 mg/5 mL Syrp; Take 5 mLs by mouth every 8 (eight) hours as needed (cough).  Dispense: 118 mL; Refill: 0  -     molnupiravir 200 mg capsule (EUA); Take 4 capsules (800 mg total) by mouth every 12 (twelve) hours. for 5 days  Dispense: 40 capsule; Refill: 0  -     predniSONE (DELTASONE) 5 MG tablet; Take 1 tablet (5 mg total) by mouth once daily. for 5 days  Dispense: 5 tablet; Refill: 0

## 2023-12-18 NOTE — PATIENT INSTRUCTIONS
COVID Positive  Start multi vitamin daily. Current CDC guidelines recommend that you quarantine for 5 days starting the day after your symptoms began. Quarantine can end after 5 days as long as the last 24 hours of quarantine you are fever free and there is improvement of all your symptoms. Wear a mask around others for 5 additional days after quarantine. Treat your symptoms as you would the common cold. If you live with anybody, isolate yourself in a separate bedroom and use a separate bathroom. If your symptoms worsen or you develop shortness of breath or a fever over 103, seek medical attention immediately.    May start viral medication today to help reduce sick time.  Cover cough at all times washing sanitized hands and surfaces regularly to help prevent the spread of COVID viral infection.  Encourage plenty water and noncarbonated fluids hydration rest over the next 5-7 days.  Alternate Tylenol and ibuprofen every 4-6 hours if needed for aches pains fever or chills.  Phenergan DM sparingly lowest dose if needed for severe cough cold congestion body aches or rest.  Do not take sedating cough medication drive or operate machinery.  If urinary frequency persist may start Macrobid antibiotic coverage.  Recommend follow-up with primary Care physician in 1-2  weeks if not improving.

## 2023-12-18 NOTE — LETTER
December 18, 2023      [facility] [place] [address]  [facility] [address]  Phone: [phone]       Patient: Jadyn Wilder   YOB: 1970  Date of Visit: 12/18/2023    To Whom It May Concern:    Rachel Wilder  was at [facility] on 12/18/2023. The patient may return to work/school on 12/21/2023 with no restrictions. If you have any questions or concerns, or if I can be of further assistance, please do not hesitate to contact me.    Sincerely,    Eve Mead MA

## 2023-12-20 ENCOUNTER — E-VISIT (OUTPATIENT)
Dept: PRIMARY CARE CLINIC | Facility: CLINIC | Age: 53
End: 2023-12-20
Payer: COMMERCIAL

## 2023-12-20 DIAGNOSIS — U07.1 COVID: Primary | ICD-10-CM

## 2023-12-20 LAB — BACTERIA UR CULT: NO GROWTH

## 2023-12-20 PROCEDURE — 99421 PR E&M, ONLINE DIGIT, EST, < 7 DAYS, 5-10 MINS: ICD-10-PCS | Mod: ,,, | Performed by: STUDENT IN AN ORGANIZED HEALTH CARE EDUCATION/TRAINING PROGRAM

## 2023-12-20 PROCEDURE — 99421 OL DIG E/M SVC 5-10 MIN: CPT | Mod: ,,, | Performed by: STUDENT IN AN ORGANIZED HEALTH CARE EDUCATION/TRAINING PROGRAM

## 2023-12-20 RX ORDER — ALBUTEROL SULFATE 90 UG/1
2 AEROSOL, METERED RESPIRATORY (INHALATION) EVERY 6 HOURS PRN
Qty: 18 G | Refills: 0 | Status: SHIPPED | OUTPATIENT
Start: 2023-12-20 | End: 2024-01-16 | Stop reason: SDUPTHER

## 2023-12-20 NOTE — PROGRESS NOTES
Date: 12/20/2023  Patient ID: [id number]   Chief Complaint: URI (Entered automatically based on patient selection in Patient Portal.)    HPI:   Jadyn Wilder is a 53 y.o. female here today for URI (Entered automatically based on patient selection in Patient Portal.)  Patient would like inhaler since she has cough and URI symptoms from Covid, which was dx 3d ago at urgent care. She was given Molnupiravir, cough syrup.      Past Medical History:   Diagnosis Date    ADD (attention deficit disorder)     Anxiety     and depression    Binge eating 9/29/2023    Closed trimalleolar fracture of right ankle with routine healing 06/22/2022    Finger pain, right 06/18/2023    Hypertension     right finger 06/18/2023    Screening for malignant neoplasm of colon     Segmental and somatic dysfunction     Spondylosis of cervical region without myelopathy or radiculopathy     Vitamin D deficiency 07/11/2023     No outpatient medications have been marked as taking for the 12/20/23 encounter (E-Visit) with Jazmín Guerra MD.     Past Surgical History:   Procedure Laterality Date    KNEE ARTHROSCOPY      OPEN REDUCTION AND INTERNAL FIXATION (ORIF) OF INJURY OF ANKLE Right 06/23/2022    Procedure: ORIF, ANKLE-will start prone-Synthes;  Surgeon: Ezekiel Emery Jr., MD;  Location: [facility];  Service: Orthopedics;  Laterality: Right;    TUBAL LIGATION       Review of patient's allergies indicates:  No Known Allergies  Family History   Problem Relation Age of Onset    Diabetes Mother     Hypertension Father     Skin cancer Father     No Known Problems Sister     No Known Problems Brother     Colon cancer Maternal Grandmother     Pancreatic cancer Maternal Grandfather       Social History     Socioeconomic History    Marital status: Significant Other   Occupational History    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   Tobacco Use    Smoking status: Former     Types: Cigarettes     Passive exposure: Never    Smokeless tobacco: Never    Tobacco comments:      Quit >15yrs ago   Substance and Sexual Activity    Alcohol use: Not Currently     Alcohol/week: 1.0 standard drink of alcohol     Types: 1 Glasses of wine per week    Drug use: Never    Sexual activity: Yes     Partners: Male     Birth control/protection: See Surgical Hx     Comment: valerie   Social History Narrative    Has granddaughter 5mos olf     Patient Care Team:  Jazmín Guerra MD as PCP - General (Family Medicine)  Nida Waldrop MD as Hypertension Digital Medicine Responsible Provider (Family Medicine)  Robin Montiel as Digital Medicine Health   Maryuri Andersen PharmD as Hypertension Digital Medicine Clinician (Pharmacist)  1, [facility] - [facility] as Hypertension Digital Medicine Contract   Assessment:       ICD-10-CM ICD-9-CM   1. COVID  U07.1 079.89      Plan:   1. COVID  Assessment & Plan:  Continue covid tx per urgent care  Start Albuterol prn. Call if sx worsen  ED precautions    Orders:  -     albuterol (PROAIR HFA) 90 mcg/actuation inhaler; Inhale 2 puffs into the lungs every 6 (six) hours as needed for Wheezing or Shortness of Breath. Rescue  Dispense: 18 g; Refill: 0         Medication List with Changes/Refills   New Medications    ALBUTEROL (PROAIR HFA) 90 MCG/ACTUATION INHALER    Inhale 2 puffs into the lungs every 6 (six) hours as needed for Wheezing or Shortness of Breath. Rescue       Start Date: 12/20/2023End Date: --   Current Medications    AMLODIPINE-VALSARTAN (EXFORGE) 5-160 MG PER TABLET    Take 1 tablet by mouth once daily.       Start Date: 7/11/2023 End Date: --    DICLOFENAC (VOLTAREN) 75 MG EC TABLET    Take 1 tablet (75 mg total) by mouth 2 (two) times daily.       Start Date: 9/11/2023 End Date: --    FLUOXETINE 40 MG CAPSULE    Take 1 capsule (40 mg total) by mouth once daily.       Start Date: 7/11/2023 End Date: --    LISDEXAMFETAMINE (VYVANSE) 50 MG CAPSULE    Take 1 capsule (50 mg total) by mouth every morning.       Start Date: 11/10/2023End Date:  --    LISDEXAMFETAMINE (VYVANSE) 50 MG CAPSULE    Take 1 capsule (50 mg total) by mouth every morning.       Start Date: 12/10/2023End Date: --    LISDEXAMFETAMINE (VYVANSE) 50 MG CAPSULE    Take 1 capsule (50 mg total) by mouth every morning.       Start Date: 1/10/2024 End Date: --    MOLNUPIRAVIR 200 MG CAPSULE (EUA)    Take 4 capsules (800 mg total) by mouth every 12 (twelve) hours. for 5 days       Start Date: 12/18/2023End Date: 12/23/2023    MUPIROCIN (BACTROBAN) 2 % OINTMENT    Apply topically 3 (three) times daily.       Start Date: 8/10/2023 End Date: --    NITROFURANTOIN, MACROCRYSTAL-MONOHYDRATE, (MACROBID) 100 MG CAPSULE    Take 1 capsule (100 mg total) by mouth 2 (two) times daily. for 10 days       Start Date: 12/18/2023End Date: 12/28/2023    PANTOPRAZOLE (PROTONIX) 40 MG TABLET    Take 1 tablet (40 mg total) by mouth 2 (two) times daily.       Start Date: 7/11/2023 End Date: 10/9/2023    PHENTERMINE (ADIPEX-P) 37.5 MG TABLET    Take 1 tablet (37.5 mg total) by mouth before breakfast.       Start Date: 11/10/2023End Date: --    PREDNISONE (DELTASONE) 5 MG TABLET    Take 1 tablet (5 mg total) by mouth once daily. for 5 days       Start Date: 12/18/2023End Date: 12/23/2023    PROMETHAZINE-DEXTROMETHORPHAN (PROMETHAZINE-DM) 6.25-15 MG/5 ML SYRP    Take 5 mLs by mouth every 8 (eight) hours as needed (cough).       Start Date: 12/18/2023End Date: 12/23/2023    PROPRANOLOL (INDERAL) 10 MG TABLET    Take 1 tablet (10 mg total) by mouth daily as needed.       Start Date: 7/11/2023 End Date: --        5-10 minutes spent on chart review and assessment/plan  Follow up if symptoms worsen or fail to improve, for keep next appt. In addition to their scheduled follow up, the patient has also been instructed to follow up on as needed basis.

## 2023-12-21 ENCOUNTER — TELEPHONE (OUTPATIENT)
Dept: PRIMARY CARE CLINIC | Facility: CLINIC | Age: 53
End: 2023-12-21
Payer: COMMERCIAL

## 2023-12-21 NOTE — TELEPHONE ENCOUNTER
LMOM 12.21.23 1:20p  LMOM 12.20.23 8:25a  LMOM 12.18.23 1:44p  ----- Message from Jazmín Guerra MD sent at 12/18/2023 12:18 PM CST -----  Normal MMG. Repeat in 1 year.      Thank you,    Dr. Guerra

## 2024-01-16 DIAGNOSIS — U07.1 COVID: ICD-10-CM

## 2024-01-16 RX ORDER — ALBUTEROL SULFATE 90 UG/1
2 AEROSOL, METERED RESPIRATORY (INHALATION) EVERY 6 HOURS PRN
Qty: 18 G | Refills: 0 | Status: SHIPPED | OUTPATIENT
Start: 2024-01-16

## 2024-02-06 ENCOUNTER — PATIENT MESSAGE (OUTPATIENT)
Dept: ADMINISTRATIVE | Facility: OTHER | Age: 54
End: 2024-02-06
Payer: COMMERCIAL

## 2024-02-09 ENCOUNTER — OFFICE VISIT (OUTPATIENT)
Dept: PRIMARY CARE CLINIC | Facility: CLINIC | Age: 54
End: 2024-02-09
Payer: COMMERCIAL

## 2024-02-09 VITALS
WEIGHT: 240.5 LBS | BODY MASS INDEX: 40.07 KG/M2 | HEIGHT: 65 IN | TEMPERATURE: 99 F | DIASTOLIC BLOOD PRESSURE: 82 MMHG | HEART RATE: 100 BPM | OXYGEN SATURATION: 95 % | SYSTOLIC BLOOD PRESSURE: 117 MMHG

## 2024-02-09 DIAGNOSIS — F98.8 ATTENTION DEFICIT DISORDER, UNSPECIFIED HYPERACTIVITY PRESENCE: Primary | ICD-10-CM

## 2024-02-09 PROCEDURE — 1159F MED LIST DOCD IN RCRD: CPT | Mod: CPTII,,, | Performed by: STUDENT IN AN ORGANIZED HEALTH CARE EDUCATION/TRAINING PROGRAM

## 2024-02-09 PROCEDURE — 1160F RVW MEDS BY RX/DR IN RCRD: CPT | Mod: CPTII,,, | Performed by: STUDENT IN AN ORGANIZED HEALTH CARE EDUCATION/TRAINING PROGRAM

## 2024-02-09 PROCEDURE — 3079F DIAST BP 80-89 MM HG: CPT | Mod: CPTII,,, | Performed by: STUDENT IN AN ORGANIZED HEALTH CARE EDUCATION/TRAINING PROGRAM

## 2024-02-09 PROCEDURE — 3008F BODY MASS INDEX DOCD: CPT | Mod: CPTII,,, | Performed by: STUDENT IN AN ORGANIZED HEALTH CARE EDUCATION/TRAINING PROGRAM

## 2024-02-09 PROCEDURE — 99213 OFFICE O/P EST LOW 20 MIN: CPT | Mod: ,,, | Performed by: STUDENT IN AN ORGANIZED HEALTH CARE EDUCATION/TRAINING PROGRAM

## 2024-02-09 PROCEDURE — 4010F ACE/ARB THERAPY RXD/TAKEN: CPT | Mod: CPTII,,, | Performed by: STUDENT IN AN ORGANIZED HEALTH CARE EDUCATION/TRAINING PROGRAM

## 2024-02-09 PROCEDURE — 3074F SYST BP LT 130 MM HG: CPT | Mod: CPTII,,, | Performed by: STUDENT IN AN ORGANIZED HEALTH CARE EDUCATION/TRAINING PROGRAM

## 2024-02-09 RX ORDER — LISDEXAMFETAMINE DIMESYLATE 50 MG/1
50 CAPSULE ORAL EVERY MORNING
Qty: 30 CAPSULE | Refills: 0 | Status: SHIPPED | OUTPATIENT
Start: 2024-02-09

## 2024-02-09 RX ORDER — LISDEXAMFETAMINE DIMESYLATE 50 MG/1
50 CAPSULE ORAL EVERY MORNING
Qty: 30 CAPSULE | Refills: 0 | Status: SHIPPED | OUTPATIENT
Start: 2024-03-09

## 2024-02-09 RX ORDER — LISDEXAMFETAMINE DIMESYLATE 50 MG/1
50 CAPSULE ORAL EVERY MORNING
Qty: 30 CAPSULE | Refills: 0 | Status: SHIPPED | OUTPATIENT
Start: 2024-04-09

## 2024-02-09 NOTE — PROGRESS NOTES
Date: 02/09/2024  Patient ID: [id number]   Chief Complaint: Medication Refill    HPI:   Jadyn Wilder is a 54 y.o. female here today for Medication Refill  Last visit pt was treated for Covid. Prior to that pt was continued on Vyvanse for binge eating and ADD. There was a short period of medication shortage, when she could not get medication, and she was unable to get daily tasks completed. Once on medication, she is able to perform and complete tasks. Needs refills on Vyvanse 50mg qd.     Patient Active Problem List   Diagnosis    Primary hypertension    Pure hypertriglyceridemia    Anxiety and depression    ADD (attention deficit disorder)    Spondylosis of cervical region without myelopathy or radiculopathy    Menopausal syndrome    Vitamin D deficiency    Severe obesity (BMI 35.0-39.9) with comorbidity    Prediabetes    Mixed hyperlipidemia    Acute cystitis without hematuria    Bilirubinuria    Back pain    Binge eating    COVID     Outpatient Medications Marked as Taking for the 2/9/24 encounter (Office Visit) with Jazmín Guerra MD   Medication Sig Dispense Refill    amlodipine-valsartan (EXFORGE) 5-160 mg per tablet Take 1 tablet by mouth once daily. 90 tablet 3    diclofenac (VOLTAREN) 75 MG EC tablet Take 1 tablet (75 mg total) by mouth 2 (two) times daily. 60 tablet 11    FLUoxetine 40 MG capsule Take 1 capsule (40 mg total) by mouth once daily. 910 capsule 3    mupirocin (BACTROBAN) 2 % ointment Apply topically 3 (three) times daily. 30 g 1    pantoprazole (PROTONIX) 40 MG tablet Take 1 tablet (40 mg total) by mouth 2 (two) times daily. 60 tablet 2    propranoloL (INDERAL) 10 MG tablet Take 1 tablet (10 mg total) by mouth daily as needed. 30 tablet 11    [DISCONTINUED] lisdexamfetamine (VYVANSE) 50 MG capsule Take 1 capsule (50 mg total) by mouth every morning. 30 capsule 0     Past Medical History:   Diagnosis Date    ADD (attention deficit disorder)     Anxiety     and depression    Binge eating  9/29/2023    Closed trimalleolar fracture of right ankle with routine healing 06/22/2022    Finger pain, right 06/18/2023    Hypertension     right finger 06/18/2023    Screening for malignant neoplasm of colon     Segmental and somatic dysfunction     Spondylosis of cervical region without myelopathy or radiculopathy     Vitamin D deficiency 07/11/2023     Past Surgical History:   Procedure Laterality Date    KNEE ARTHROSCOPY      OPEN REDUCTION AND INTERNAL FIXATION (ORIF) OF INJURY OF ANKLE Right 06/23/2022    Procedure: ORIF, ANKLE-will start prone-Synthes;  Surgeon: Ezekiel Emery Jr., MD;  Location: [facility];  Service: Orthopedics;  Laterality: Right;    TUBAL LIGATION       Review of patient's allergies indicates:  No Known Allergies  Family History   Problem Relation Age of Onset    Diabetes Mother     Hypertension Father     Skin cancer Father     No Known Problems Sister     No Known Problems Brother     Colon cancer Maternal Grandmother     Pancreatic cancer Maternal Grandfather       Social History     Socioeconomic History    Marital status: Significant Other   Occupational History    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   Tobacco Use    Smoking status: Former     Types: Cigarettes     Passive exposure: Never    Smokeless tobacco: Never    Tobacco comments:     Quit >15yrs ago   Substance and Sexual Activity    Alcohol use: Not Currently     Alcohol/week: 1.0 standard drink of alcohol     Types: 1 Glasses of wine per week    Drug use: Never    Sexual activity: Yes     Partners: Male     Birth control/protection: See Surgical Hx     Comment: valerie   Social History Narrative    Has granddaughter 5mos olf     Social Determinants of Health     Financial Resource Strain: Low Risk  (2/6/2024)    Overall Financial Resource Strain (CARDIA)     Difficulty of Paying Living Expenses: Not very hard   Food Insecurity: No Food Insecurity (2/6/2024)    Hunger Vital Sign     Worried About Running Out of Food in the Last Year:  "Never true     Ran Out of Food in the Last Year: Never true   Transportation Needs: No Transportation Needs (2/6/2024)    PRAPARE - Transportation     Lack of Transportation (Medical): No     Lack of Transportation (Non-Medical): No   Physical Activity: Inactive (2/6/2024)    Exercise Vital Sign     Days of Exercise per Week: 0 days     Minutes of Exercise per Session: 0 min   Stress: No Stress Concern Present (2/6/2024)    [employer] [place] [employer] - Occupational Stress Questionnaire     Feeling of Stress : Not at all   Social Connections: Unknown (2/6/2024)    Social Connection and Isolation Panel [NHANES]     Frequency of Communication with Friends and Family: More than three times a week     Frequency of Social Gatherings with Friends and Family: Twice a week     Active Member of Clubs or Organizations: No     Attends Club or Organization Meetings: Never     Marital Status:    Housing Stability: Low Risk  (2/6/2024)    Housing Stability Vital Sign     Unable to Pay for Housing in the Last Year: No     Number of Places Lived in the Last Year: 1     Unstable Housing in the Last Year: No     Patient Care Team:  Jazmín Guerra MD as PCP - General (Family Medicine)  Nida Waldrop MD as Hypertension Digital Medicine Responsible Provider (Family Medicine)  Robin Montiel as Digital Medicine Health   Maryuri Andersen PharmD as Hypertension Digital Medicine Clinician (Pharmacist)  1, [employer] Employee Plan - [facility] as Hypertension Digital Medicine Contract   Subjective:   ROS  See HPI for details  All Other ROS: Negative except as stated in HPI.   Objective:   /82   Pulse 100   Temp 98.6 °F (37 °C)   Ht 5' 5" (1.651 m)   Wt 109.1 kg (240 lb 8 oz)   LMP  (LMP Unknown)   SpO2 95%   BMI 40.02 kg/m²   Physical Exam  Cardiovascular:      Rate and Rhythm: Normal rate and regular rhythm.      Heart sounds: Normal heart sounds.   Pulmonary:      Effort: Pulmonary effort is " normal.   Neurological:      Mental Status: She is alert and oriented to person, place, and time.   Psychiatric:         Mood and Affect: Mood normal.         Behavior: Behavior normal.         Thought Content: Thought content normal.         Judgment: Judgment normal.       Assessment:       ICD-10-CM ICD-9-CM   1. Attention deficit disorder, unspecified hyperactivity presence  F98.8 314.00      Plan:   1. Attention deficit disorder, unspecified hyperactivity presence  Assessment & Plan:  Controlled  Continue Vyvanse to 50 mg daily.  AE discussed  Controlled substance agreement signed today  /narx care checked without discrepancies noted  3-month medication refills given and printed for patient  Quarterly visits required for continued prescriptions with minimum annual wellness exam  Discussed adverse effects that include but not limited to cardiovascular event, stroke, heart attack, death      Orders:  -     lisdexamfetamine (VYVANSE) 50 MG capsule; Take 1 capsule (50 mg total) by mouth every morning.  Dispense: 30 capsule; Refill: 0  -     lisdexamfetamine (VYVANSE) 50 MG capsule; Take 1 capsule (50 mg total) by mouth every morning.  Dispense: 30 capsule; Refill: 0  -     lisdexamfetamine (VYVANSE) 50 MG capsule; Take 1 capsule (50 mg total) by mouth every morning.  Dispense: 30 capsule; Refill: 0         Medication List with Changes/Refills   New Medications    LISDEXAMFETAMINE (VYVANSE) 50 MG CAPSULE    Take 1 capsule (50 mg total) by mouth every morning.       Start Date: 4/9/2024  End Date: --    LISDEXAMFETAMINE (VYVANSE) 50 MG CAPSULE    Take 1 capsule (50 mg total) by mouth every morning.       Start Date: 3/9/2024  End Date: --   Current Medications    ALBUTEROL (PROAIR HFA) 90 MCG/ACTUATION INHALER    Inhale 2 puffs into the lungs every 6 (six) hours as needed for Wheezing or Shortness of Breath. Rescue       Start Date: 1/16/2024 End Date: --    AMLODIPINE-VALSARTAN (EXFORGE) 5-160 MG PER TABLET     Take 1 tablet by mouth once daily.       Start Date: 7/11/2023 End Date: --    DICLOFENAC (VOLTAREN) 75 MG EC TABLET    Take 1 tablet (75 mg total) by mouth 2 (two) times daily.       Start Date: 9/11/2023 End Date: --    FLUOXETINE 40 MG CAPSULE    Take 1 capsule (40 mg total) by mouth once daily.       Start Date: 7/11/2023 End Date: --    LISDEXAMFETAMINE (VYVANSE) 50 MG CAPSULE    Take 1 capsule (50 mg total) by mouth every morning.       Start Date: 11/10/2023End Date: --    LISDEXAMFETAMINE (VYVANSE) 50 MG CAPSULE    Take 1 capsule (50 mg total) by mouth every morning.       Start Date: 12/10/2023End Date: --    MUPIROCIN (BACTROBAN) 2 % OINTMENT    Apply topically 3 (three) times daily.       Start Date: 8/10/2023 End Date: --    PANTOPRAZOLE (PROTONIX) 40 MG TABLET    Take 1 tablet (40 mg total) by mouth 2 (two) times daily.       Start Date: 7/11/2023 End Date: 2/9/2024    PHENTERMINE (ADIPEX-P) 37.5 MG TABLET    Take 1 tablet (37.5 mg total) by mouth before breakfast.       Start Date: 11/10/2023End Date: --    PROPRANOLOL (INDERAL) 10 MG TABLET    Take 1 tablet (10 mg total) by mouth daily as needed.       Start Date: 7/11/2023 End Date: --   Changed and/or Refilled Medications    Modified Medication Previous Medication    LISDEXAMFETAMINE (VYVANSE) 50 MG CAPSULE lisdexamfetamine (VYVANSE) 50 MG capsule       Take 1 capsule (50 mg total) by mouth every morning.    Take 1 capsule (50 mg total) by mouth every morning.       Start Date: 2/9/2024  End Date: --    Start Date: 1/10/2024 End Date: 2/9/2024        Follow up in about 3 months (around 5/9/2024) for ADD/ADHD, Med Refill. In addition to their scheduled follow up, the patient has also been instructed to follow up on as needed basis.

## 2024-02-09 NOTE — ASSESSMENT & PLAN NOTE
Controlled  Continue Vyvanse to 50 mg daily.  AE discussed  Controlled substance agreement signed today  /narx care checked without discrepancies noted  3-month medication refills given and printed for patient  Quarterly visits required for continued prescriptions with minimum annual wellness exam  Discussed adverse effects that include but not limited to cardiovascular event, stroke, heart attack, death

## 2024-02-20 ENCOUNTER — PATIENT MESSAGE (OUTPATIENT)
Dept: ADMINISTRATIVE | Facility: OTHER | Age: 54
End: 2024-02-20
Payer: COMMERCIAL

## 2024-02-29 DIAGNOSIS — F41.9 ANXIETY AND DEPRESSION: ICD-10-CM

## 2024-02-29 DIAGNOSIS — F32.A ANXIETY AND DEPRESSION: ICD-10-CM

## 2024-02-29 RX ORDER — FLUOXETINE HYDROCHLORIDE 40 MG/1
40 CAPSULE ORAL DAILY
Qty: 910 CAPSULE | Refills: 3 | Status: SHIPPED | OUTPATIENT
Start: 2024-02-29

## 2024-03-27 ENCOUNTER — OFFICE VISIT (OUTPATIENT)
Dept: PRIMARY CARE CLINIC | Facility: CLINIC | Age: 54
End: 2024-03-27
Payer: COMMERCIAL

## 2024-03-27 VITALS
BODY MASS INDEX: 40.32 KG/M2 | DIASTOLIC BLOOD PRESSURE: 78 MMHG | HEIGHT: 65 IN | SYSTOLIC BLOOD PRESSURE: 125 MMHG | OXYGEN SATURATION: 98 % | WEIGHT: 242 LBS | HEART RATE: 80 BPM

## 2024-03-27 DIAGNOSIS — I10 PRIMARY HYPERTENSION: Primary | ICD-10-CM

## 2024-03-27 DIAGNOSIS — F41.9 ANXIETY AND DEPRESSION: ICD-10-CM

## 2024-03-27 DIAGNOSIS — F32.A ANXIETY AND DEPRESSION: ICD-10-CM

## 2024-03-27 PROCEDURE — 1159F MED LIST DOCD IN RCRD: CPT | Mod: CPTII,,, | Performed by: STUDENT IN AN ORGANIZED HEALTH CARE EDUCATION/TRAINING PROGRAM

## 2024-03-27 PROCEDURE — 3078F DIAST BP <80 MM HG: CPT | Mod: CPTII,,, | Performed by: STUDENT IN AN ORGANIZED HEALTH CARE EDUCATION/TRAINING PROGRAM

## 2024-03-27 PROCEDURE — 99214 OFFICE O/P EST MOD 30 MIN: CPT | Mod: ,,, | Performed by: STUDENT IN AN ORGANIZED HEALTH CARE EDUCATION/TRAINING PROGRAM

## 2024-03-27 PROCEDURE — 3074F SYST BP LT 130 MM HG: CPT | Mod: CPTII,,, | Performed by: STUDENT IN AN ORGANIZED HEALTH CARE EDUCATION/TRAINING PROGRAM

## 2024-03-27 PROCEDURE — 3008F BODY MASS INDEX DOCD: CPT | Mod: CPTII,,, | Performed by: STUDENT IN AN ORGANIZED HEALTH CARE EDUCATION/TRAINING PROGRAM

## 2024-03-27 PROCEDURE — 1160F RVW MEDS BY RX/DR IN RCRD: CPT | Mod: CPTII,,, | Performed by: STUDENT IN AN ORGANIZED HEALTH CARE EDUCATION/TRAINING PROGRAM

## 2024-03-27 PROCEDURE — 4010F ACE/ARB THERAPY RXD/TAKEN: CPT | Mod: CPTII,,, | Performed by: STUDENT IN AN ORGANIZED HEALTH CARE EDUCATION/TRAINING PROGRAM

## 2024-03-27 NOTE — ASSESSMENT & PLAN NOTE
Continue current medication regimen with Exforge and Propranolol  Recent elevation likely 2/2 stress/anxiety  Continue to monitor for sx improvement  Blood pressure at goal <140/90 (<130/80 if otherwise noted)  Recommend DASH diet  Avoid tobacco use  Record BP at home daily and bring log to next office visit, assure that home cuff is calibrated at minimum every 12 months

## 2024-03-27 NOTE — ASSESSMENT & PLAN NOTE
Labile  Schedule breaks/vacation  Continue Fluoxetine 40mg qd  Mindfulness techniques  Consider adjusting meds if not working

## 2024-03-27 NOTE — PROGRESS NOTES
Date: 03/27/2024  Patient ID: [id number]   Chief Complaint: Hypertension (Lightheaded, headaches)    HPI:   Jadyn Wilder is a 54 y.o. female here today for Hypertension (Lightheaded, headaches)  2d ago BP was elevated and had posterior headache and lightheadedness; when she was worked up/anxiety increased had some chest tightness. SBP went up to 145 and DBP went to 98. Does note increased stress at work, which she works from home. She rested that night and tried relaxation, which improved BP the following day. Also was taking Vyvanse 50mg qd. Denies bv, sob. Plans to take more scheduled vacations/breaks    Patient Active Problem List   Diagnosis    Primary hypertension    Pure hypertriglyceridemia    Anxiety and depression    ADD (attention deficit disorder)    Spondylosis of cervical region without myelopathy or radiculopathy    Menopausal syndrome    Vitamin D deficiency    Severe obesity (BMI 35.0-39.9) with comorbidity    Prediabetes    Mixed hyperlipidemia    Acute cystitis without hematuria    Bilirubinuria    Back pain    Binge eating    COVID     Outpatient Medications Marked as Taking for the 3/27/24 encounter (Office Visit) with Jazmín Geurra MD   Medication Sig Dispense Refill    amlodipine-valsartan (EXFORGE) 5-160 mg per tablet Take 1 tablet by mouth once daily. 90 tablet 3    diclofenac (VOLTAREN) 75 MG EC tablet Take 1 tablet (75 mg total) by mouth 2 (two) times daily. 60 tablet 11    FLUoxetine 40 MG capsule Take 1 capsule (40 mg total) by mouth once daily. 910 capsule 3    lisdexamfetamine (VYVANSE) 50 MG capsule Take 1 capsule (50 mg total) by mouth every morning. 30 capsule 0    [START ON 4/9/2024] lisdexamfetamine (VYVANSE) 50 MG capsule Take 1 capsule (50 mg total) by mouth every morning. 30 capsule 0    lisdexamfetamine (VYVANSE) 50 MG capsule Take 1 capsule (50 mg total) by mouth every morning. 30 capsule 0    mupirocin (BACTROBAN) 2 % ointment Apply topically 3 (three) times daily. 30 g 1     pantoprazole (PROTONIX) 40 MG tablet Take 1 tablet (40 mg total) by mouth 2 (two) times daily. 60 tablet 2    propranoloL (INDERAL) 10 MG tablet Take 1 tablet (10 mg total) by mouth daily as needed. 30 tablet 11     Past Medical History:   Diagnosis Date    ADD (attention deficit disorder)     Anxiety     and depression    Binge eating 9/29/2023    Closed trimalleolar fracture of right ankle with routine healing 06/22/2022    Finger pain, right 06/18/2023    Hypertension     right finger 06/18/2023    Screening for malignant neoplasm of colon     Segmental and somatic dysfunction     Spondylosis of cervical region without myelopathy or radiculopathy     Vitamin D deficiency 07/11/2023     Past Surgical History:   Procedure Laterality Date    FRACTURE SURGERY  06/24/2022    Broken right ankle    KNEE ARTHROSCOPY      OPEN REDUCTION AND INTERNAL FIXATION (ORIF) OF INJURY OF ANKLE Right 06/23/2022    Procedure: ORIF, ANKLE-will start prone-Synthes;  Surgeon: Ezekiel Emery Jr., MD;  [facility];  Service: Orthopedics;  Laterality: Right;    TUBAL LIGATION       Review of patient's allergies indicates:  No Known Allergies  Family History   Problem Relation Age of Onset    Diabetes Mother     Hypertension Father     Skin cancer Father     No Known Problems Sister     No Known Problems Brother     Colon cancer Maternal Grandmother     Pancreatic cancer Maternal Grandfather       Social History     Socioeconomic History    Marital status: Significant Other   Occupational History    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   Tobacco Use    Smoking status: Former     Types: Cigarettes     Passive exposure: Never    Smokeless tobacco: Never    Tobacco comments:     Quit >15yrs ago   Substance and Sexual Activity    Alcohol use: Not Currently    Drug use: Never    Sexual activity: Yes     Partners: Male     Birth control/protection: See Surgical Hx     Comment: valerie   Social History Narrative    Has granddaughter 5mos olf      Social Determinants of Health     Financial Resource Strain: Low Risk  (2/6/2024)    Overall Financial Resource Strain (CARDIA)     Difficulty of Paying Living Expenses: Not very hard   Food Insecurity: No Food Insecurity (2/6/2024)    Hunger Vital Sign     Worried About Running Out of Food in the Last Year: Never true     Ran Out of Food in the Last Year: Never true   Transportation Needs: No Transportation Needs (2/6/2024)    PRAPARE - Transportation     Lack of Transportation (Medical): No     Lack of Transportation (Non-Medical): No   Physical Activity: Inactive (2/6/2024)    Exercise Vital Sign     Days of Exercise per Week: 0 days     Minutes of Exercise per Session: 0 min   Stress: No Stress Concern Present (2/6/2024)    [employer] [place] [employer] - Occupational Stress Questionnaire     Feeling of Stress : Not at all   Social Connections: Unknown (2/6/2024)    Social Connection and Isolation Panel [NHANES]     Frequency of Communication with Friends and Family: More than three times a week     Frequency of Social Gatherings with Friends and Family: Twice a week     Active Member of Clubs or Organizations: No     Attends Club or Organization Meetings: Never     Marital Status:    Housing Stability: Low Risk  (2/6/2024)    Housing Stability Vital Sign     Unable to Pay for Housing in the Last Year: No     Number of Places Lived in the Last Year: 1     Unstable Housing in the Last Year: No     Patient Care Team:  Jazmín Guerra MD as PCP - General (Family Medicine)  Nida Waldrop MD as Hypertension Digital Medicine Responsible Provider (Family Medicine)  Robin Montiel as Digital Medicine Health   Maryuri Andersen, PharmD as Hypertension Digital Medicine Clinician (Pharmacist)   [facility] [employer] Plan [facility] as Hypertension Digital Medicine Contract  Jazmín Guerra MD as Hyperlipidemia Digital Medicine Responsible Provider (Family Medicine)  Maryuri Andersen, PharmD  "as Hyperlipidemia Digital Medicine Clinician (Pharmacist)   Subjective:   ROS  See HPI for details  All Other ROS: Negative except as stated in HPI.   Objective:   /78   Pulse 80   Ht 5' 5" (1.651 m)   Wt 109.8 kg (242 lb)   LMP  (LMP Unknown)   SpO2 98%   BMI 40.27 kg/m²   Physical Exam  Constitutional:       General: She is not in acute distress.     Appearance: She is obese.   Cardiovascular:      Rate and Rhythm: Normal rate and regular rhythm.   Pulmonary:      Effort: Pulmonary effort is normal.   Neurological:      Mental Status: She is alert and oriented to person, place, and time.   Psychiatric:         Mood and Affect: Mood normal.         Behavior: Behavior normal.         Thought Content: Thought content normal.         Judgment: Judgment normal.       Assessment:       ICD-10-CM ICD-9-CM   1. Primary hypertension  I10 401.9   2. Anxiety and depression  F41.9 300.00    F32.A 311      Plan:   1. Primary hypertension  Assessment & Plan:  Continue current medication regimen with Exforge and Propranolol  Recent elevation likely 2/2 stress/anxiety  Continue to monitor for sx improvement  Blood pressure at goal <140/90 (<130/80 if otherwise noted)  Recommend DASH diet  Avoid tobacco use  Record BP at home daily and bring log to next office visit, assure that home cuff is calibrated at minimum every 12 months        2. Anxiety and depression  Assessment & Plan:  Labile  Schedule breaks/vacation  Continue Fluoxetine 40mg qd  Mindfulness techniques  Consider adjusting meds if not working           Medication List with Changes/Refills   Current Medications    ALBUTEROL (PROAIR HFA) 90 MCG/ACTUATION INHALER    Inhale 2 puffs into the lungs every 6 (six) hours as needed for Wheezing or Shortness of Breath. Rescue       Start Date: 1/16/2024 End Date: --    AMLODIPINE-VALSARTAN (EXFORGE) 5-160 MG PER TABLET    Take 1 tablet by mouth once daily.       Start Date: 7/11/2023 End Date: --    DICLOFENAC " (VOLTAREN) 75 MG EC TABLET    Take 1 tablet (75 mg total) by mouth 2 (two) times daily.       Start Date: 9/11/2023 End Date: --    FLUOXETINE 40 MG CAPSULE    Take 1 capsule (40 mg total) by mouth once daily.       Start Date: 2/29/2024 End Date: --    LISDEXAMFETAMINE (VYVANSE) 50 MG CAPSULE    Take 1 capsule (50 mg total) by mouth every morning.       Start Date: 11/10/2023End Date: --    LISDEXAMFETAMINE (VYVANSE) 50 MG CAPSULE    Take 1 capsule (50 mg total) by mouth every morning.       Start Date: 12/10/2023End Date: --    LISDEXAMFETAMINE (VYVANSE) 50 MG CAPSULE    Take 1 capsule (50 mg total) by mouth every morning.       Start Date: 2/9/2024  End Date: --    LISDEXAMFETAMINE (VYVANSE) 50 MG CAPSULE    Take 1 capsule (50 mg total) by mouth every morning.       Start Date: 4/9/2024  End Date: --    LISDEXAMFETAMINE (VYVANSE) 50 MG CAPSULE    Take 1 capsule (50 mg total) by mouth every morning.       Start Date: 3/9/2024  End Date: --    MUPIROCIN (BACTROBAN) 2 % OINTMENT    Apply topically 3 (three) times daily.       Start Date: 8/10/2023 End Date: --    PANTOPRAZOLE (PROTONIX) 40 MG TABLET    Take 1 tablet (40 mg total) by mouth 2 (two) times daily.       Start Date: 7/11/2023 End Date: 3/27/2024    PHENTERMINE (ADIPEX-P) 37.5 MG TABLET    Take 1 tablet (37.5 mg total) by mouth before breakfast.       Start Date: 11/10/2023End Date: --    PROPRANOLOL (INDERAL) 10 MG TABLET    Take 1 tablet (10 mg total) by mouth daily as needed.       Start Date: 7/11/2023 End Date: --        Follow up if symptoms worsen or fail to improve, for keep next appt for refill and f/u htn, anxiety. In addition to their scheduled follow up, the patient has also been instructed to follow up on as needed basis.

## 2024-05-08 ENCOUNTER — OFFICE VISIT (OUTPATIENT)
Dept: PRIMARY CARE CLINIC | Facility: CLINIC | Age: 54
End: 2024-05-08
Payer: COMMERCIAL

## 2024-05-08 ENCOUNTER — PATIENT MESSAGE (OUTPATIENT)
Dept: PRIMARY CARE CLINIC | Facility: CLINIC | Age: 54
End: 2024-05-08

## 2024-05-08 VITALS
OXYGEN SATURATION: 95 % | HEIGHT: 65 IN | BODY MASS INDEX: 40.24 KG/M2 | DIASTOLIC BLOOD PRESSURE: 83 MMHG | HEART RATE: 87 BPM | SYSTOLIC BLOOD PRESSURE: 120 MMHG | WEIGHT: 241.5 LBS

## 2024-05-08 DIAGNOSIS — E66.01 SEVERE OBESITY (BMI 35.0-39.9) WITH COMORBIDITY: ICD-10-CM

## 2024-05-08 DIAGNOSIS — F32.A ANXIETY AND DEPRESSION: Primary | ICD-10-CM

## 2024-05-08 DIAGNOSIS — E78.2 MIXED HYPERLIPIDEMIA: ICD-10-CM

## 2024-05-08 DIAGNOSIS — E55.9 VITAMIN D DEFICIENCY: ICD-10-CM

## 2024-05-08 DIAGNOSIS — F41.9 ANXIETY AND DEPRESSION: Primary | ICD-10-CM

## 2024-05-08 DIAGNOSIS — R73.03 PREDIABETES: ICD-10-CM

## 2024-05-08 DIAGNOSIS — F98.8 ATTENTION DEFICIT DISORDER, UNSPECIFIED HYPERACTIVITY PRESENCE: ICD-10-CM

## 2024-05-08 DIAGNOSIS — E66.01 SEVERE OBESITY (BMI 35.0-39.9) WITH COMORBIDITY: Primary | ICD-10-CM

## 2024-05-08 PROCEDURE — 4010F ACE/ARB THERAPY RXD/TAKEN: CPT | Mod: CPTII,,, | Performed by: STUDENT IN AN ORGANIZED HEALTH CARE EDUCATION/TRAINING PROGRAM

## 2024-05-08 PROCEDURE — 99214 OFFICE O/P EST MOD 30 MIN: CPT | Mod: ,,, | Performed by: STUDENT IN AN ORGANIZED HEALTH CARE EDUCATION/TRAINING PROGRAM

## 2024-05-08 PROCEDURE — 3008F BODY MASS INDEX DOCD: CPT | Mod: CPTII,,, | Performed by: STUDENT IN AN ORGANIZED HEALTH CARE EDUCATION/TRAINING PROGRAM

## 2024-05-08 PROCEDURE — 1159F MED LIST DOCD IN RCRD: CPT | Mod: CPTII,,, | Performed by: STUDENT IN AN ORGANIZED HEALTH CARE EDUCATION/TRAINING PROGRAM

## 2024-05-08 PROCEDURE — 3079F DIAST BP 80-89 MM HG: CPT | Mod: CPTII,,, | Performed by: STUDENT IN AN ORGANIZED HEALTH CARE EDUCATION/TRAINING PROGRAM

## 2024-05-08 PROCEDURE — 3074F SYST BP LT 130 MM HG: CPT | Mod: CPTII,,, | Performed by: STUDENT IN AN ORGANIZED HEALTH CARE EDUCATION/TRAINING PROGRAM

## 2024-05-08 RX ORDER — ATORVASTATIN CALCIUM 10 MG/1
10 TABLET, FILM COATED ORAL DAILY
Qty: 90 TABLET | Refills: 3 | Status: SHIPPED | OUTPATIENT
Start: 2024-05-08 | End: 2025-05-08

## 2024-05-08 RX ORDER — LISDEXAMFETAMINE DIMESYLATE 50 MG/1
50 CAPSULE ORAL EVERY MORNING
Qty: 30 CAPSULE | Refills: 0 | Status: SHIPPED | OUTPATIENT
Start: 2024-05-08

## 2024-05-08 RX ORDER — SEMAGLUTIDE 0.25 MG/.5ML
0.25 INJECTION, SOLUTION SUBCUTANEOUS
Qty: 0.5 ML | Refills: 2 | Status: SHIPPED | OUTPATIENT
Start: 2024-05-08 | End: 2024-05-09

## 2024-05-08 RX ORDER — LISDEXAMFETAMINE DIMESYLATE 50 MG/1
50 CAPSULE ORAL EVERY MORNING
Qty: 30 CAPSULE | Refills: 0 | Status: SHIPPED | OUTPATIENT
Start: 2024-06-08

## 2024-05-08 RX ORDER — LISDEXAMFETAMINE DIMESYLATE 50 MG/1
50 CAPSULE ORAL EVERY MORNING
Qty: 30 CAPSULE | Refills: 0 | Status: SHIPPED | OUTPATIENT
Start: 2024-07-08

## 2024-05-08 NOTE — ASSESSMENT & PLAN NOTE
Stable  Continue Fluoxetine 40mg qd  Mindfulness techniques  Consider adjusting meds if not working

## 2024-05-08 NOTE — PROGRESS NOTES
Date: 05/08/2024  Patient ID: [id number]   Chief Complaint: Medication Refill    HPI:   Jadyn Wilder is a 54 y.o. female here today for Medication Refill  Patient needs refill on generic for Vyvanse. Patient also still having difficulty with weight loss and would like to try another injectable. Had lipid panel with work that showed elevated chol levels    Patient Active Problem List   Diagnosis    Primary hypertension    Pure hypertriglyceridemia    Anxiety and depression    ADD (attention deficit disorder)    Spondylosis of cervical region without myelopathy or radiculopathy    Menopausal syndrome    Vitamin D deficiency    Severe obesity (BMI 35.0-39.9) with comorbidity    Prediabetes    Mixed hyperlipidemia    Acute cystitis without hematuria    Bilirubinuria    Back pain    Binge eating    COVID     Outpatient Medications Marked as Taking for the 5/8/24 encounter (Office Visit) with Jazmín Guerra MD   Medication Sig Dispense Refill    amlodipine-valsartan (EXFORGE) 5-160 mg per tablet Take 1 tablet by mouth once daily. 90 tablet 3    diclofenac (VOLTAREN) 75 MG EC tablet Take 1 tablet (75 mg total) by mouth 2 (two) times daily. 60 tablet 11    FLUoxetine 40 MG capsule Take 1 capsule (40 mg total) by mouth once daily. 910 capsule 3    lisdexamfetamine (VYVANSE) 50 MG capsule Take 1 capsule (50 mg total) by mouth every morning. 30 capsule 0    mupirocin (BACTROBAN) 2 % ointment Apply topically 3 (three) times daily. 30 g 1    pantoprazole (PROTONIX) 40 MG tablet Take 1 tablet (40 mg total) by mouth 2 (two) times daily. 60 tablet 2    propranoloL (INDERAL) 10 MG tablet Take 1 tablet (10 mg total) by mouth daily as needed. 30 tablet 11     Past Medical History:   Diagnosis Date    ADD (attention deficit disorder)     Anxiety     and depression    Binge eating 9/29/2023    Closed trimalleolar fracture of right ankle with routine healing 06/22/2022    Finger pain, right 06/18/2023    Hypertension     right finger  06/18/2023    Screening for malignant neoplasm of colon     Segmental and somatic dysfunction     Spondylosis of cervical region without myelopathy or radiculopathy     Vitamin D deficiency 07/11/2023     Past Surgical History:   Procedure Laterality Date    FRACTURE SURGERY  06/24/2022    Broken right ankle    KNEE ARTHROSCOPY      OPEN REDUCTION AND INTERNAL FIXATION (ORIF) OF INJURY OF ANKLE Right 06/23/2022    Procedure: ORIF, ANKLE-will start prone-Synthes;  Surgeon: Ezekiel Emery Jr., MD;  Location: 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   Tobacco Use    Smoking status: Former     Types: Cigarettes     Passive exposure: Never    Smokeless tobacco: Never    Tobacco comments:     Quit >15yrs ago   Substance and Sexual Activity    Alcohol use: Not Currently    Drug use: Never    Sexual activity: Yes     Partners: Male     Birth control/protection: See Surgical Hx     Comment: fisusi   Social History Narrative    Has granddaughter 5mos olf     Social Determinants of Health     Financial Resource Strain: Low Risk  (2/6/2024)    Overall Financial Resource Strain (CARDIA)     Difficulty of Paying Living Expenses: Not very hard   Food Insecurity: No Food Insecurity (2/6/2024)    Hunger Vital Sign     Worried About Running Out of Food in the Last Year: Never true     Ran Out of Food in the Last Year: Never true   Transportation Needs: No Transportation Needs (2/6/2024)    PRAPARE - Transportation  "    Lack of Transportation (Medical): No     Lack of Transportation (Non-Medical): No   Physical Activity: Inactive (2/6/2024)    Exercise Vital Sign     Days of Exercise per Week: 0 days     Minutes of Exercise per Session: 0 min   Stress: No Stress Concern Present (2/6/2024)    [employer] [place] [employer] - Occupational Stress Questionnaire     Feeling of Stress : Not at all   Housing Stability: Low Risk  (2/6/2024)    Housing Stability Vital Sign     Unable to Pay for Housing in the Last Year: No     Number of Places Lived in the Last Year: 1     Unstable Housing in the Last Year: No     Patient Care Team:  Jazmín Guerra MD as PCP - General (Family Medicine)  Nida Waldrop MD as Hypertension Digital Medicine Responsible Provider (Family Medicine)  Robin Montiel as Digital Medicine Health   Maryuri Andersen PharmD as Hypertension Digital Medicine Clinician (Pharmacist)  1, [employer] Employee Plan - [facility] as Hypertension Digital Medicine Contract  Jazmín Guerra MD as Hyperlipidemia Digital Medicine Responsible Provider (Family Medicine)  Maryuri Andersen PharmD as Hyperlipidemia Digital Medicine Clinician (Pharmacist)   Subjective:   ROS  See HPI for details  All Other ROS: Negative except as stated in HPI.   Objective:   /83   Pulse 87   Ht 5' 5" (1.651 m)   Wt 109.5 kg (241 lb 8 oz)   LMP  (LMP Unknown)   SpO2 95%   BMI 40.19 kg/m²   Physical Exam  Constitutional:       Appearance: She is obese.   Cardiovascular:      Rate and Rhythm: Normal rate and regular rhythm.      Heart sounds: Normal heart sounds.   Pulmonary:      Effort: Pulmonary effort is normal.   Neurological:      Mental Status: She is alert and oriented to person, place, and time.   Psychiatric:         Mood and Affect: Mood normal.         Behavior: Behavior normal.         Thought Content: Thought content normal.         Judgment: Judgment normal.       Assessment:       ICD-10-CM ICD-9-CM   1. " Anxiety and depression  F41.9 300.00    F32.A 311   2. Severe obesity (BMI 35.0-39.9) with comorbidity  E66.01 278.01   3. Mixed hyperlipidemia  E78.2 272.2   4. Attention deficit disorder, unspecified hyperactivity presence  F98.8 314.00   5. Vitamin D deficiency  E55.9 268.9   6. Prediabetes  R73.03 790.29      Plan:   1. Anxiety and depression  Assessment & Plan:  Stable  Continue Fluoxetine 40mg qd  Mindfulness techniques  Consider adjusting meds if not working      2. Severe obesity (BMI 35.0-39.9) with comorbidity  Overview:  Body mass index is 38.89 kg/m².  Attempt Wegovy .25mg qwk. AE previously discussed  Consider bariatric surgery if BMI >40 or >35 with comorbidities. Referral sent and appt pending  Recommend intensive, multicomponent, behavioral interventions:  Goal BMI <30.  Goal is to exercise at least 5 times a week for 30 minutes per day.   -Stand more each day.   -Increase exercise: Start with 10 minutes daily and build up to 60-90 minutes/day with moderate activity  Reduce caloric intake:  -Women: 0638-6103 kcal/day  -Men: 1018-5991 kcal/day  Avoid soda, simple sugars, excessive rice, potatoes or bread. Limit fast foods and fried foods.  Choose complex carbs in moderation (example: green vegetables, beans, oatmeal). Eat plenty of fresh fruits and vegetables with lean meats daily.  Do not skip meals. Eat a balanced portion size.  Avoid fad diets. Consider long-term healthy life style changes.       Orders:  -     semaglutide, weight loss, (WEGOVY) 0.25 mg/0.5 mL PnIj; Inject 0.25 mg into the skin every 7 days.  Dispense: 0.5 mL; Refill: 2  -     CBC Auto Differential; Future; Expected date: 08/08/2024  -     Comprehensive Metabolic Panel; Future; Expected date: 08/08/2024  -     Lipid Panel; Future; Expected date: 08/08/2024  -     TSH; Future; Expected date: 08/08/2024  -     Hemoglobin A1C; Future; Expected date: 08/08/2024  -     Urinalysis; Future; Expected date: 08/08/2024  -     Vitamin D;  Future; Expected date: 08/08/2024  -     T4, Free; Future; Expected date: 08/08/2024  -     Hepatitis C Antibody; Future; Expected date: 08/08/2024  -     HIV 1/2 Ag/Ab (4th Gen); Future; Expected date: 08/08/2024    3. Mixed hyperlipidemia  Overview:  Uncontrolled  Start Atorvastatin 10mg qd. AE discussed  Can try omega-3 fatty acids (total,LDL,TG), niacin (TG), CoQ10 (total), Red yeast rice (LDL,TG,inc HDL)  Encourage weight loss by least 5% and to increase aerobic exercise and resistance training  Limit simple sugars and simple carbohydrate intake-focus on low glycemic foods  Optimize blood sugar control          Orders:  -     atorvastatin (LIPITOR) 10 MG tablet; Take 1 tablet (10 mg total) by mouth once daily.  Dispense: 90 tablet; Refill: 3  -     Lipid Panel; Future; Expected date: 08/08/2024    4. Attention deficit disorder, unspecified hyperactivity presence  Assessment & Plan:  Controlled  Continue Vyvanse to 50 mg daily.  AE discussed  Controlled substance agreement signed today  /narx care checked without discrepancies noted  3-month medication refills given and printed for patient  Quarterly visits required for continued prescriptions with minimum annual wellness exam  Discussed adverse effects that include but not limited to cardiovascular event, stroke, heart attack, death      Orders:  -     lisdexamfetamine (VYVANSE) 50 MG capsule; Take 1 capsule (50 mg total) by mouth every morning.  Dispense: 30 capsule; Refill: 0  -     lisdexamfetamine (VYVANSE) 50 MG capsule; Take 1 capsule (50 mg total) by mouth every morning.  Dispense: 30 capsule; Refill: 0  -     lisdexamfetamine (VYVANSE) 50 MG capsule; Take 1 capsule (50 mg total) by mouth every morning.  Dispense: 30 capsule; Refill: 0    5. Vitamin D deficiency  Overview:  Recheck level    Orders:  -     Vitamin D; Future; Expected date: 08/08/2024    6. Prediabetes  Overview:  Prediabetic if A1c 5.7-6.4%, fasting glucose 100-125 mg/dL  Recommend 7%  weight loss and exercise 150 minutes per week, decreased caloric intake (consider Mediterranean, DASH, vegetarian diet)  Follow ADA Diet. Avoid soda, simple sweets, and limit rice/pasta/breads/starches (no more than 45-50 grams per meal).  Maintain healthy weight with goal BMI <30.  Exercise at least 5 times per week for 30 minutes per day.          Orders:  -     Hemoglobin A1C; Future; Expected date: 08/08/2024         Medication List with Changes/Refills   New Medications    ATORVASTATIN (LIPITOR) 10 MG TABLET    Take 1 tablet (10 mg total) by mouth once daily.       Start Date: 5/8/2024  End Date: 5/8/2025    LISDEXAMFETAMINE (VYVANSE) 50 MG CAPSULE    Take 1 capsule (50 mg total) by mouth every morning.       Start Date: 6/8/2024  End Date: --    LISDEXAMFETAMINE (VYVANSE) 50 MG CAPSULE    Take 1 capsule (50 mg total) by mouth every morning.       Start Date: 7/8/2024  End Date: --    SEMAGLUTIDE, WEIGHT LOSS, (WEGOVY) 0.25 MG/0.5 ML PNIJ    Inject 0.25 mg into the skin every 7 days.       Start Date: 5/8/2024  End Date: --   Current Medications    ALBUTEROL (PROAIR HFA) 90 MCG/ACTUATION INHALER    Inhale 2 puffs into the lungs every 6 (six) hours as needed for Wheezing or Shortness of Breath. Rescue       Start Date: 1/16/2024 End Date: --    AMLODIPINE-VALSARTAN (EXFORGE) 5-160 MG PER TABLET    Take 1 tablet by mouth once daily.       Start Date: 7/11/2023 End Date: --    DICLOFENAC (VOLTAREN) 75 MG EC TABLET    Take 1 tablet (75 mg total) by mouth 2 (two) times daily.       Start Date: 9/11/2023 End Date: --    FLUOXETINE 40 MG CAPSULE    Take 1 capsule (40 mg total) by mouth once daily.       Start Date: 2/29/2024 End Date: --    LISDEXAMFETAMINE (VYVANSE) 50 MG CAPSULE    Take 1 capsule (50 mg total) by mouth every morning.       Start Date: 12/10/2023End Date: --    LISDEXAMFETAMINE (VYVANSE) 50 MG CAPSULE    Take 1 capsule (50 mg total) by mouth every morning.       Start Date: 2/9/2024  End Date: --     LISDEXAMFETAMINE (VYVANSE) 50 MG CAPSULE    Take 1 capsule (50 mg total) by mouth every morning.       Start Date: 4/9/2024  End Date: --    LISDEXAMFETAMINE (VYVANSE) 50 MG CAPSULE    Take 1 capsule (50 mg total) by mouth every morning.       Start Date: 3/9/2024  End Date: --    MUPIROCIN (BACTROBAN) 2 % OINTMENT    Apply topically 3 (three) times daily.       Start Date: 8/10/2023 End Date: --    PANTOPRAZOLE (PROTONIX) 40 MG TABLET    Take 1 tablet (40 mg total) by mouth 2 (two) times daily.       Start Date: 7/11/2023 End Date: 5/8/2024    PROPRANOLOL (INDERAL) 10 MG TABLET    Take 1 tablet (10 mg total) by mouth daily as needed.       Start Date: 7/11/2023 End Date: --   Changed and/or Refilled Medications    Modified Medication Previous Medication    LISDEXAMFETAMINE (VYVANSE) 50 MG CAPSULE lisdexamfetamine (VYVANSE) 50 MG capsule       Take 1 capsule (50 mg total) by mouth every morning.    Take 1 capsule (50 mg total) by mouth every morning.       Start Date: 5/8/2024  End Date: --    Start Date: 11/10/2023End Date: 5/8/2024   Discontinued Medications    PHENTERMINE (ADIPEX-P) 37.5 MG TABLET    Take 1 tablet (37.5 mg total) by mouth before breakfast.       Start Date: 11/10/2023End Date: 5/8/2024        Follow up in about 3 months (around 8/8/2024) for Wellness, Weight loss, HLD. In addition to their scheduled follow up, the patient has also been instructed to follow up on as needed basis.

## 2024-05-10 NOTE — TELEPHONE ENCOUNTER
Please inform patient that I do not like using compound pharmacies due to possible inconsistencies in the formula. However, patient can pursue that option if she wants to.    Thank you,    Dr. Guerra

## 2024-06-05 DIAGNOSIS — M54.50 LOW BACK PAIN, UNSPECIFIED BACK PAIN LATERALITY, UNSPECIFIED CHRONICITY, UNSPECIFIED WHETHER SCIATICA PRESENT: Primary | ICD-10-CM

## 2024-06-19 ENCOUNTER — PATIENT MESSAGE (OUTPATIENT)
Dept: ADMINISTRATIVE | Facility: OTHER | Age: 54
End: 2024-06-19
Payer: COMMERCIAL

## 2024-06-19 ENCOUNTER — PATIENT MESSAGE (OUTPATIENT)
Dept: OTHER | Facility: OTHER | Age: 54
End: 2024-06-19
Payer: COMMERCIAL

## 2024-06-19 ENCOUNTER — CLINICAL SUPPORT (OUTPATIENT)
Dept: NUTRITION | Facility: CLINIC | Age: 54
End: 2024-06-19
Payer: COMMERCIAL

## 2024-06-19 DIAGNOSIS — Z71.3 DIETARY COUNSELING AND SURVEILLANCE: Primary | ICD-10-CM

## 2024-06-19 PROCEDURE — 97802 MEDICAL NUTRITION INDIV IN: CPT | Mod: 95,,, | Performed by: NUTRITIONIST

## 2024-06-19 NOTE — PATIENT INSTRUCTIONS
"    Name: Jadyn Wilder   Date: 06/19/2024    Recommended daily energy requirements to reach your goals:  1,900 Calories  140 grams Protein  140 grams Carbohydrates  85 grams Fat (focus more on plant-based healthy fats)  120 ounces of fluid per day    -Goals:  - "Yrn #7 Rule" --> Aisle products of grocery stores, look for brands that have <7 grams added sugar and at least 7 grams protein  - I love your goal of going to [employer] 3 days per week for at least 30 minutes. If you're to do 45 minutes, that would be idea. Increase time of exercise gradually  - Sweet tooth: If craving sweets, have up to 200 calories/day  - Deep fried foods: Enjoy at most twice a month. Refer to Fueling Well on the go guide for better-for-you fast food options  - If having a caramel mocchiato, do a tall (small) a couple days a week  - Even 100% fruit juice contains the same sugar as a soft drink. If having juice, have at most 4 oz a couple days a week. Trop 50 would be a better option to try and you can do 6 oz  - Frozen waffles: Kashi GO PROTEIN ([employer]) + 1 tablespoon PB  - 1 packet lower sugar oatmeal + 2 tablespoons Vital Proteins Collagen Peptides, unflavored (Get at least 10-15 grams protein) or 2-3 whole eggs on the side, etc  - 1/2 [employer] Killer blueberry bagel + additional protein and/or healthy fat with it to fill you up  - Eat every 3-4 hours. Snacks are important  -Carbohydrates: Breakfast and lunch: up to 2 servings of carbs = 30-40 grams; snacks and dinner aim for ~1 serving of carbs = 15-20 grams  - Protein: Lunch and Dinner 5-6 oz cooked lean protein; breakfast and snacks aim for at least 20 grams protein  -Smoothies: Add 2 teaspoons healthy fat and use 1.5 scoops 100% whey protein powder + 1 cup fruit total. Mix with Fairlife milk -or- unsweetened almond milk with 2 oz OJ (If you want to use a low sugar flavored Greek yogurt also that is fine- see brands in your sample meal plan)  -Portion control and consistency " are key

## 2024-06-19 NOTE — PROGRESS NOTES
"Nutrition Assessment    Visit Type: employee wellness consult  Session Time:  1 Hour  Reason for MNT visit: Pt in for education and nutrition counseling regarding Obesity.   The patient location is: her home  The chief complaint leading to consultation is: goal of weight loss and needing to increase Energy    Visit type: audiovisual    Face to Face time with patient: 47  60 minutes of total time spent on the encounter, which includes face to face time and non-face to face time preparing to see the patient (eg, review of tests), Obtaining and/or reviewing separately obtained history, Documenting clinical information in the electronic or other health record, Independently interpreting results (not separately reported) and communicating results to the patient/family/caregiver, or Care coordination (not separately reported).       Each patient to whom he or she provides medical services by telemedicine is:  (1) informed of the relationship between the physician and patient and the respective role of any other health care provider with respect to management of the patient; and (2) notified that he or she may decline to receive medical services by telemedicine and may withdraw from such care at any time.      Age: 54 y.o.  Wt:   Wt Readings from Last 1 Encounters:   05/08/24 109.5 kg (241 lb 8 oz)     Ht:   Ht Readings from Last 1 Encounters:   05/08/24 5' 5" (1.651 m)     BMI:   BMI Readings from Last 1 Encounters:   05/08/24 40.19 kg/m²       Client states:  Reports wanting to lose weight and feel better overall. Her goal weight is 195 pounds. She has a busy lifestyle with work, child playing sports and has a 17 month old grandbaby    Medical History  Problem List             Resolved    Primary hypertension         Pure hypertriglyceridemia         Anxiety and depression         ADD (attention deficit disorder)         Spondylosis of cervical region without myelopathy or radiculopathy         Menopausal syndrome      "    Vitamin D deficiency         Severe obesity (BMI 35.0-39.9) with comorbidity         Prediabetes         Mixed hyperlipidemia         Acute cystitis without hematuria         Bilirubinuria         Back pain         Binge eating         COVID            Past Medical History:   Diagnosis Date    ADD (attention deficit disorder)     Anxiety     and depression    Binge eating 9/29/2023    Closed trimalleolar fracture of right ankle with routine healing 06/22/2022    Finger pain, right 06/18/2023    Hypertension     right finger 06/18/2023    Screening for malignant neoplasm of colon     Segmental and somatic dysfunction     Spondylosis of cervical region without myelopathy or radiculopathy     Vitamin D deficiency 07/11/2023       Past Surgical History:   Procedure Laterality Date    FRACTURE SURGERY  06/24/2022    Broken right ankle    KNEE ARTHROSCOPY      OPEN REDUCTION AND INTERNAL FIXATION (ORIF) OF INJURY OF ANKLE Right 06/23/2022    Procedure: ORIF, ANKLE-will start prone-Synthes;  Surgeon: Ezekiel Emery Jr., MD;  Location: [facility];  Service: Orthopedics;  Laterality: Right;    TUBAL LIGATION            Medications    Prior to Admission medications    Medication Sig Start Date End Date Taking? Authorizing Provider   albuterol (PROAIR HFA) 90 mcg/actuation inhaler Inhale 2 puffs into the lungs every 6 (six) hours as needed for Wheezing or Shortness of Breath. Rescue 1/16/24   Jazmín Guerra MD   amlodipine-valsartan (EXFORGE) 5-160 mg per tablet Take 1 tablet by mouth once daily. 7/11/23   Jazmín Guerra MD   atorvastatin (LIPITOR) 10 MG tablet Take 1 tablet (10 mg total) by mouth once daily. 5/8/24 5/8/25  Jazmín Guerra MD   diclofenac (VOLTAREN) 75 MG EC tablet Take 1 tablet (75 mg total) by mouth 2 (two) times daily. 9/11/23   Jazmín Guerra MD   FLUoxetine 40 MG capsule Take 1 capsule (40 mg total) by mouth once daily. 2/29/24   Jazmín Guerra MD   lisdexamfetamine (VYVANSE) 50 MG capsule  Take 1 capsule (50 mg total) by mouth every morning. 12/10/23   Jazmín Guerra MD   lisdexamfetamine (VYVANSE) 50 MG capsule Take 1 capsule (50 mg total) by mouth every morning. 2/9/24   Jazmín Guerra MD   lisdexamfetamine (VYVANSE) 50 MG capsule Take 1 capsule (50 mg total) by mouth every morning. 4/9/24   Jazmín Guerra MD   lisdexamfetamine (VYVANSE) 50 MG capsule Take 1 capsule (50 mg total) by mouth every morning. 3/9/24   Jazmín Guerra MD   lisdexamfetamine (VYVANSE) 50 MG capsule Take 1 capsule (50 mg total) by mouth every morning. 5/8/24   Jazmín Guerra MD   lisdexamfetamine (VYVANSE) 50 MG capsule Take 1 capsule (50 mg total) by mouth every morning. 6/8/24   Jazmín Guerra MD   lisdexamfetamine (VYVANSE) 50 MG capsule Take 1 capsule (50 mg total) by mouth every morning. 7/8/24   Jazmín Guerra MD   mupirocin (BACTROBAN) 2 % ointment Apply topically 3 (three) times daily. 8/10/23   Jazmín Guerra MD   pantoprazole (PROTONIX) 40 MG tablet Take 1 tablet (40 mg total) by mouth 2 (two) times daily. 7/11/23 5/8/24  Jazmín Guerra MD   propranoloL (INDERAL) 10 MG tablet Take 1 tablet (10 mg total) by mouth daily as needed. 7/11/23   Jazmín Guerra MD   semaglutide (OZEMPIC) 0.25 mg or 0.5 mg (2 mg/3 mL) pen injector Inject 0.25 mg into the skin every 7 days. 5/9/24   Jazmín Guerra MD        Vitamins, Minerals, and/or Supplements:  10,000 international units Vitamin D3 every 3 days; B12     Food Allergies or Intolerances:  Lactose and dairy intolerant      Social History    Marital status:  Significant Other    Social History     Tobacco Use    Smoking status: Former     Types: Cigarettes     Passive exposure: Never    Smokeless tobacco: Never    Tobacco comments:     Quit >15yrs ago   Substance Use Topics    Alcohol use: Not Currently     Current Alcohol use: none    Lab Reports:  Reviewed and noted     Lab Results   Component Value Date    SPSWGHBV71UL 24.2 (L) 10/14/2022     TSH 1.0588 10/14/2022    CRP 3.60 09/20/2023    SEDRATE 19 09/20/2023    AST 18 09/20/2023    ALT 19 09/20/2023    GLUCOSE 94 09/20/2023    HGBA1C 5.2 10/14/2022         BP Readings from Last 1 Encounters:   05/08/24 120/83        24-hour Recall:     Food choices (After Midnight)  Patient eating midnight to 4am: (P) Never        Food choices (Early Morning)  Patient eats 4am to 8am: (P) Every day   Home cooked meals (4am - 8am): (P) breakfast, waffles or a bagel or oatmeal   Fast food meals (4am - 8am): (P) occasional starbucks coffee   Snacks (4am - 8am): (P) purchased from grocery store      Food choices (Morning)  Patient eats 8am to noon: (P) Never                 Food choices (Afternoon)  Patient eats noon to 4pm: (P) Every day   Home cooked meals (Noon - 4pm): (P) leftover from night before or sandwich   Snacks (Noon to 4pm): (P) purchased from grocery store     Food choices (Evening)   Patient eats 4pm to 8pm: (P) Every day   Home cooked meals (4pm - 8pm): (P) Home cooked prepared by myself   Fast food meals (4pm - 8pm): (P) pizza   Snacks (4pm - 8pm): (P) purchased from grocery store     Food choices (Late evening)  R [facility] PEQ NUTRITION HOW OFTEN EATING LATE EVENING: (P) Several times a week   Home cooked meals (8pm - midnight): (P) Stir downs, Baked chicken, pasta dish   Fast food meals (8pm - midnight): (P) pizza or burgers   Snacks (8pm - midnight: (P) purchased from grocery      Beverages:  How much water consumed (per day?): (P) 3   Cups of milk consumed (per day?): (P) 0       Cups of  juice consumed (per day?): (P) 1   Number of supplement shakes (per day?): (P) 0       Number of cups of coffee (per day?): (P) 1   Coffee: (P) Caffinated, Sugar, Flavorings (Vanilla, Mocha, Caramel etc.)   Tea:: (P) Lemon   Cups of soda consumed (per day?): (P) 1   Other non-alcoholic beverages consumed (per day?): (P) 0          LIFESTYLE FACTORS    Dinning out: 1-2 x per week    Meal preparation/shopping: Who does the  "grocery shopping:: (P) me Who prepares the meals: (P) me     Stress Level: moderate    Support System:  significant other    Exercise Regimen: Sedentary (little or no exercise)      Diagnosis    Excessive energy intake related to eating too many calories as evidenced by 24 hour recall.      Intervention    Estimated Energy Requirements:   1,900 Calories  140 grams Protein  140 grams Carbohydrates  85 grams Fat (focus more on plant-based healthy fats)  120 ounces of fluid per day    Recommendations & Goals:  Patient goals and recommendations are tailored to the specific patient's needs, readiness to change, lifestyle, culture, skills, resources, & abilities. Strategies to help achieve these nutrition-related goals were discussed which can include but are not limited to SMART goal setting & mindful eating.     Aim for a minimum of 7 hours sleep   Exercise 60 minutes most days  Eat breakfast within 1-2 hours of waking up  Try not to skip any meals or snacks, not going more than 3-4 hours without eating   At each meal and snack, try to include a source of fiber + lean protein + healthy fat     Written Materials Provided  These resources are intended to assist the patient in making it easier to choose recommended options when eating out & to identify better-for-you brands at the grocery store:    Meal Planning Guide with recommendations discussed along with portion sizes and a customized meal plan   Fueling Well On-the-Go Food Guide  Eat Fit Shopping Guide  Lifestyle Nutrition Meal Guide  RD contact information    Goals:   - "Yrn #7 Rule" --> Aisle products of grocery stores, look for brands that have <7 grams added sugar and at least 7 grams protein  - I love your goal of going to [employer] 3 days per week for at least 30 minutes. If you're to do 45 minutes, that would be idea. Increase time of exercise gradually  - Sweet tooth: If craving sweets, have up to 200 calories/day  - Deep fried foods: Enjoy at most " twice a month. Refer to Fueling Well on the go guide for better-for-you fast food options  - If having a caramel mocchiato, do a tall (small) a couple days a week  - Even 100% fruit juice contains the same sugar as a soft drink. If having juice, have at most 4 oz a couple days a week. Trop 50 would be a better option to try and you can do 6 oz  - Frozen waffles: Kashi GO PROTEIN ([employer]) + 1 tablespoon PB  - 1 packet lower sugar oatmeal + 2 tablespoons Vital Proteins Collagen Peptides, unflavored (Get at least 10-15 grams protein) or 2-3 whole eggs on the side, etc  - 1/2 [employer] Killer blueberry bagel + additional protein and/or healthy fat with it to fill you up  - Eat every 3-4 hours. Snacks are important  -Carbohydrates: Breakfast and lunch: up to 2 servings of carbs = 30-40 grams; snacks and dinner aim for ~1 serving of carbs = 15-20 grams  - Protein: Lunch and Dinner 5-6 oz cooked lean protein; breakfast and snacks aim for at least 20 grams protein  -Smoothies: Add 2 teaspoons healthy fat and use 1.5 scoops 100% whey protein powder + 1 cup fruit total. Mix with Fairlife milk -or- unsweetened almond milk with 2 oz OJ (If you want to use a low sugar flavored Greek yogurt also that is fine- see brands in your sample meal plan)  -Portion control and consistency are cifuentes      Monitoring/Evaluation    Monitor the following:  Weight  Sleep  Stress Management  Movement  Nutrient intake in reference to meal plan    Communicated with healthcare provider and documented plan for referral to appropriate agency/healthcare provider as needed    Supervising Physician: Miguel Andres MD    Patient motivation, anticipated barriers, expected compliance: Patient is motivated and has verbalized understanding and intent to comply.     Comprehension: good     Follow-up: 4-6 weeks or prn

## 2024-06-25 DIAGNOSIS — R21 RASH: ICD-10-CM

## 2024-07-18 ENCOUNTER — PATIENT MESSAGE (OUTPATIENT)
Dept: INTERNAL MEDICINE | Facility: CLINIC | Age: 54
End: 2024-07-18

## 2024-07-18 ENCOUNTER — OFFICE VISIT (OUTPATIENT)
Dept: INTERNAL MEDICINE | Facility: CLINIC | Age: 54
End: 2024-07-18
Payer: COMMERCIAL

## 2024-07-18 DIAGNOSIS — E66.01 SEVERE OBESITY (BMI 35.0-39.9) WITH COMORBIDITY: Primary | ICD-10-CM

## 2024-07-18 PROCEDURE — 99499 UNLISTED E&M SERVICE: CPT | Mod: 95,,,

## 2024-07-18 RX ORDER — SEMAGLUTIDE 0.25 MG/.5ML
0.25 INJECTION, SOLUTION SUBCUTANEOUS
Qty: 2 ML | Refills: 0 | Status: ACTIVE | OUTPATIENT
Start: 2024-07-18

## 2024-07-18 RX ORDER — SEMAGLUTIDE 0.5 MG/.5ML
0.5 INJECTION, SOLUTION SUBCUTANEOUS
Qty: 2 ML | Refills: 0 | Status: ACTIVE | OUTPATIENT
Start: 2024-08-15

## 2024-07-18 RX ORDER — SEMAGLUTIDE 1 MG/.5ML
1 INJECTION, SOLUTION SUBCUTANEOUS
Qty: 2 ML | Refills: 0 | Status: ACTIVE | OUTPATIENT
Start: 2024-09-12

## 2024-07-18 NOTE — PROGRESS NOTES
"Patient ID: Jadyn Wilder is a 54 y.o.    Black or   Other female    Subjective  Chief Complaint: patient presents for medical weight loss management.    Contraindications to GLP-1 receptor agonist therapy:   Denies personal or family history of MTC, personal history of MEN2, history of allergic reaction while taking a GLP-1 receptor agonist, and history of pancreatitis while taking a GLP-1 receptor agonist     Co-morbidities: HTN, DLD, prediabetes    History of weight loss therapy:  None. Is interested in starting Wegovy today.    Weight loss history:  Current weight:    7/16/2024   Recent Readings    Weight (lbs) 237 lb    BMI 39.43 BMI        Objective  Lab Results   Component Value Date     09/20/2023     (L) 10/14/2022     07/08/2017     Lab Results   Component Value Date    K 4.6 09/20/2023    K 4.4 10/14/2022    K 3.4 (L) 07/08/2017     Lab Results   Component Value Date     09/20/2023     10/14/2022     07/08/2017     Lab Results   Component Value Date    CO2 25 09/20/2023    CO2 26 10/14/2022    CO2 21.0 07/08/2017     Lab Results   Component Value Date    BUN 12.1 09/20/2023    BUN 8.6 (L) 10/14/2022    BUN 15.0 07/08/2017     No results found for: "GLU"  Lab Results   Component Value Date    CALCIUM 9.3 09/20/2023    CALCIUM 9.1 10/14/2022    CALCIUM 9.0 07/08/2017     No results found for: "PROT"  Lab Results   Component Value Date    ALBUMIN 4.0 09/20/2023    ALBUMIN 3.8 10/14/2022    ALBUMIN 3.70 07/08/2017     Lab Results   Component Value Date    BILITOT 0.6 09/20/2023    BILITOT 0.6 10/14/2022    BILITOT 1.4 (H) 07/08/2017     Lab Results   Component Value Date    AST 18 09/20/2023    AST 20 10/14/2022    AST 29 07/08/2017     Lab Results   Component Value Date    ALT 19 09/20/2023    ALT 21 10/14/2022    ALT 38 07/08/2017     No results found for: "ANIONGAP"  Lab Results   Component Value Date    CREATININE 0.70 09/20/2023    CREATININE 0.62 " 10/14/2022    CREATININE 0.91 07/08/2017     Lab Results   Component Value Date    EGFRNORACEVR >60 09/20/2023    EGFRNORACEVR >60 10/14/2022         Assessment/Plan  -Pt qualifies for GLP-1 RA therapy based on BMI greater than or equal to 30 kg/m2  -Initiate Wegovy 0.25 mg once weekly for 1 month  -Then increase to Wegovy 0.5 mg once weekly for 1 month  -Then increase to Wegovy 1 mg once weekly  -RTC in 3 months        Patient consented to pharmacist management via collaborative practice.

## 2024-07-18 NOTE — PATIENT INSTRUCTIONS
WEGOVY® (wee-JOSE-vee), (semaglutide) injection, for subcutaneous use  Read this Medication Guide and Instructions for Use before you start using WEGOVY and each time you get a refill. There may be new information. This information does not take the place of talking to your healthcare provider about your medical condition or your treatment.  What is the most important information I should know about WEGOVY?   WEGOVY may cause serious side effects, including:   Possible thyroid tumors, including cancer. Tell your healthcare provider if you get a lump or swelling in your neck, hoarseness, trouble swallowing, or shortness of breath. These may be symptoms of thyroid cancer. In studies with rodents, WEGOVY and medicines that work like WEGOVY caused thyroid tumors, including thyroid cancer. It is not known if WEGOVY will cause thyroid tumors or a type of thyroid cancer called medullary thyroid carcinoma (MTC) in people.   Do not use WEGOVY if you or any of your family have ever had a type of thyroid cancer called medullary thyroid carcinoma (MTC), or if you have an endocrine system condition called Multiple Endocrine Neoplasia syndrome type 2 (MEN 2).  What is WEGOVY?   WEGOVY is an injectable prescription medicine used with a reduced calorie diet and increased physical activity:   to reduce the risk of major cardiovascular events such as death, heart attack, or stroke in adults with known heart disease and with either obesity or overweight.   that may help adults and children aged 12 years and older with obesity, or some adults with overweight who also have weight-related medical problems, to help them lose excess body weight and keep the weight off.   WEGOVY contains semaglutide and should not be used with other semaglutide-containing products or other GLP-1 receptor agonist medicines.  It is not known if WEGOVY is safe and effective for use in children under 12 years of age.  Do not use WEGOVY if:   you or any of your  family have ever had a type of thyroid cancer called medullary thyroid carcinoma (MTC) or if you have an endocrine system condition called Multiple Endocrine Neoplasia syndrome type 2 (MEN 2).   you have had a serious allergic reaction to semaglutide or any of the ingredients in WEGOVY. See the end of this Medication Guide for a complete list of ingredients in WEGOVY. Symptoms of a serious allergic reaction include:   swelling of your face, lips, tongue or throat   fainting or feeling dizzy   problems breathing or swallowing   very rapid heartbeat   severe rash or itching  Before using WEGOVY, tell your healthcare provider if you have any other medical conditions, including if you:   have or have had problems with your pancreas or kidneys.   have type 2 diabetes and a history of diabetic retinopathy.   have or have had depression or suicidal thoughts, or mental health issues.   are pregnant or plan to become pregnant. WEGOVY may harm your unborn baby. You should stop using WEGOVY 2 months before you plan to become pregnant.   Pregnancy Exposure Registry: There is a pregnancy exposure registry for women who use WEGOVY during pregnancy. The purpose of this registry is to collect information about the health of you and your baby. Talk to your healthcare provider about how you can take part in this registry or you may contact [employer] at 1-718.363.9092.   are breastfeeding or plan to breastfeed. It is not known if WEGOVY passes into your breast milk. You should talk with your healthcare provider about the best way to feed your baby while using WEGOVY.   Tell your healthcare provider about all the medicines you take, including prescription and over-the-counter medicines, vitamins, and herbal supplements. WEGOVY may affect the way some medicines work and some medicines may affect the way WEGOVY works. Tell your healthcare provider if you are taking other medicines to treat diabetes, including sulfonylureas or insulin.  WEGOVY slows stomach emptying and can affect medicines that need to pass through the stomach quickly.   Know the medicines you take. Keep a list of them to show your healthcare provider and pharmacist when you get a new medicine.  How should I use WEGOVY?   Read the Instructions for Use that comes with WEGOVY.   Use WEGOVY exactly as your healthcare provider tells you to.   Your healthcare provider should show you how to use WEGOVY before you use it for the first time.   WEGOVY is injected under the skin (subcutaneously) of your stomach (abdomen), thigh, or upper arm. Do not inject WEGOVY into a muscle (intramuscularly) or vein (intravenously).   Change (rotate) your injection site with each injection. Do not use the same site for each injection.   Use WEGOVY 1 time each week, on the same day each week, at any time of the day.   Start WEGOVY with 0.25 mg per week in your first month. In your second month, increase your weekly dose to 0.5 mg. In the third month, increase your weekly dose to 1 mg. In the fourth month, increase your weekly dose to 1.7 mg. In the fifth month onwards, your healthcare provider may either maintain your dose at 1.7 mg weekly or increase your weekly dose to 2.4 mg.   If you need to change the day of the week, you may do so as long as your last dose of WEGOVY was given 2 or more days before.   If you miss a dose of WEGOVY and the next scheduled dose is more than 2 days away (48 hours), take the missed dose as soon as possible. If you miss a dose of WEGOVY and the next schedule dose is less than 2 days away (48 hours), do not administer the dose. Take your next dose on the regularly scheduled day.   If you miss doses of WEGOVY for more than 2 weeks, take your next dose on the regularly scheduled day or call your healthcare provider to talk about how to restart your treatment.   You can take WEGOVY with or without food.   If you take too much WEGOVY, you may have severe nausea, severe vomiting  and severe low blood sugar. Call your healthcare provider or go to the nearest hospital emergency room right away if you experience any of these symptoms.  What are the possible side effects of WEGOVY?   WEGOVY may cause serious side effects, including:   See What is the most important information I should know about WEGOVY?   inflammation of your pancreas (pancreatitis). Stop using WEGOVY and call your healthcare provider right away if you have severe pain in your stomach area (abdomen) that will not go away, with or without vomiting. You may feel the pain from your abdomen to your back.   gallbladder problems. WEGOVY may cause gallbladder problems including gallstones. Some gallbladder problems need surgery. Call your healthcare provider if you have any of the following symptoms:   pain in your upper stomach (abdomen)   yellowing of skin or eyes (jaundice)   fever   patrick-colored stools   increased risk of low blood sugar (hypoglycemia), especially those who also take medicines to treat diabetes mellitus such as insulin or sulfonylureas. Low blood sugar in patients with diabetes who receive WEGOVY can be a serious side effect. Talk to your healthcare provider about how to recognize and treat low blood sugar. You should check your blood sugar before you start taking WEGOVY and while you take WEGOVY. Signs and symptoms of low blood sugar may include:   dizziness or light-headedness   sweating   shakiness   blurred vision   slurred speech   weakness   anxiety   hunger   headache   irritability or mood changes   confusion or drowsiness   fast heartbeat   feeling jittery  kidney problems (kidney failure). In people who have kidney problems, diarrhea, nausea, and vomiting may cause a loss of fluids (dehydration) which may cause kidney problems to get worse. It is important for you to drink fluids to help reduce your chance of dehydration.   serious allergic reactions. Stop using WEGOVY and get medical help right away,  if you have any symptoms of a serious allergic reaction including:   swelling of your face, lips, tongue   severe rash or itching o very rapid heartbeat or throat   problems breathing or swallowing fainting or feeling dizzy   change in vision in people with type 2 diabetes. Tell your healthcare provider if you have changes in vision during treatment with WEGOVY.   increased heart rate. WEGOVY can increase your heart rate while you are at rest. Your healthcare provider should check your heart rate while you take WEGOVY. Tell your healthcare provider if you feel your heart racing or pounding in your chest and it lasts for several minutes.   depression or thoughts of suicide. You should pay attention to any mental changes, especially sudden changes in your mood, behaviors, thoughts, or feelings. Call your healthcare provider right away if you have any mental changes that are new, worse, or worry you.   The most common side effects of WEGOVY in adults or children aged 12 years and older may include:  nausea   stomach (abdomen) pain   dizziness   gas   diarrhea   headache   feeling bloated   stomach flu   vomiting   tiredness (fatigue)   belching   heartburn   constipation   upset stomach   low blood sugar in people   runny nose or sore throat with type 2 diabetes   Talk to your healthcare provider about any side effect that bothers you or does not go away. These are not all the possible side effects of WEGOVY. Call your doctor for medical advice about side effects. You may report side effects to FDA at 9-310-FDA-2737.  General information about the safe and effective use of WEGOVY.  Medicines are sometimes prescribed for purposes other than those listed in a Medication Guide. Do not use WEGOVY for a condition for which it was not prescribed. Do not give WEGOVY to other people, even if they have the same symptoms that you have. It may harm them. You can ask your pharmacist or healthcare provider for information about  WEGOVY that is written for health professionals.  What are the ingredients in WEGOVY?   Active Ingredient: semaglutide   Inactive Ingredients: disodium phosphate dihydrate, 1.42 mg; sodium chloride, 8.25 mg; water for injection; and hydrochloric acid or sodium hydroxide may be added to adjust pH.  For more information, go to [employer] or call 9-385-Uvhgkp-7.

## 2024-07-19 ENCOUNTER — OFFICE VISIT (OUTPATIENT)
Dept: URGENT CARE | Facility: CLINIC | Age: 54
End: 2024-07-19
Payer: COMMERCIAL

## 2024-07-19 VITALS
BODY MASS INDEX: 40.15 KG/M2 | SYSTOLIC BLOOD PRESSURE: 107 MMHG | WEIGHT: 241 LBS | HEART RATE: 104 BPM | TEMPERATURE: 99 F | DIASTOLIC BLOOD PRESSURE: 77 MMHG | RESPIRATION RATE: 18 BRPM | HEIGHT: 65 IN | OXYGEN SATURATION: 99 %

## 2024-07-19 DIAGNOSIS — R09.81 SINUS CONGESTION: ICD-10-CM

## 2024-07-19 DIAGNOSIS — J01.40 ACUTE NON-RECURRENT PANSINUSITIS: Primary | ICD-10-CM

## 2024-07-19 LAB
CTP QC/QA: YES
POC MOLECULAR INFLUENZA A AGN: NEGATIVE
POC MOLECULAR INFLUENZA B AGN: NEGATIVE
SARS-COV-2 AG RESP QL IA.RAPID: NEGATIVE
SARS-COV-2 RDRP RESP QL NAA+PROBE: NEGATIVE

## 2024-07-19 PROCEDURE — 99213 OFFICE O/P EST LOW 20 MIN: CPT | Mod: 25,,, | Performed by: FAMILY MEDICINE

## 2024-07-19 PROCEDURE — 96372 THER/PROPH/DIAG INJ SC/IM: CPT | Mod: ,,, | Performed by: FAMILY MEDICINE

## 2024-07-19 PROCEDURE — 87811 SARS-COV-2 COVID19 W/OPTIC: CPT | Mod: QW,,, | Performed by: FAMILY MEDICINE

## 2024-07-19 RX ORDER — CEFDINIR 300 MG/1
300 CAPSULE ORAL 2 TIMES DAILY
Qty: 14 CAPSULE | Refills: 0 | Status: SHIPPED | OUTPATIENT
Start: 2024-07-19 | End: 2024-07-26

## 2024-07-19 RX ORDER — BETAMETHASONE SODIUM PHOSPHATE AND BETAMETHASONE ACETATE 3; 3 MG/ML; MG/ML
9 INJECTION, SUSPENSION INTRA-ARTICULAR; INTRALESIONAL; INTRAMUSCULAR; SOFT TISSUE ONCE
Status: COMPLETED | OUTPATIENT
Start: 2024-07-19 | End: 2024-07-19

## 2024-07-19 RX ADMIN — BETAMETHASONE SODIUM PHOSPHATE AND BETAMETHASONE ACETATE 9 MG: 3; 3 INJECTION, SUSPENSION INTRA-ARTICULAR; INTRALESIONAL; INTRAMUSCULAR; SOFT TISSUE at 11:07

## 2024-07-19 NOTE — PROGRESS NOTES
"Subjective:      Patient ID: Jadyn Wilder is a 54 y.o. female.    Vitals:  height is 5' 5" (1.651 m) and weight is 109.3 kg (241 lb). Her temperature is 98.6 °F (37 °C). Her blood pressure is 107/77 and her pulse is 104. Her respiration is 18 and oxygen saturation is 99%.     Chief Complaint: Nasal Congestion (Entered by patient) and Sinus Problem     Patient is a 54 y.o. female who presents to urgent care with complaints of cough, nasal congestion, HA, sinus drip  x5 days. Alleviating factors include thera flu with mild amount of relief. Patient denies fever.    ROS :  Constitutional : _ no fever, positive for body aches and headache  Eyes : _No redness, drainage or pain  HENT_sore throat, postnasal drainage  Respiratory_no wheezing, no shortness of breath  Cardiovascular_no chest pain  Gastrointestinal_ No vomiting, No diarrhea, No abdominal pain  Musculoskeletal_no joint pain, no joint swelling  Integumentary_no skin rash     54-year-old female present to clinic with concerns of nasal congestion, sinus congestion, postnasal drip, headache and coughing since 5 days.  Symptoms gradual in onset and worsening.  No concerns of positive exposure to infections, no recent travel.  States vaccinated for COVID-19.  Over-the-counter medications some relief.  Reviewed nurse's notes and vital signs.  States had tolerated cortisone injection in the past with no issues.  No diagnosis of hemoglobin A1c, we will be starting Wegovy next week for weight loss and to help with the blood pressure and cholesterol    Objective:     Physical Exam  General : Alert and Oriented, No apparent distress, afebrile, sounds stuffy and congested  Neck - supple  HENT : Oropharynx no redness or swelling.  Bilateral TMs intact mild fluid no redness.   Respiratory : Bilateral equal breath sounds, nonlabored respirations  Cardiovascular : Rate, rhythm regular, normal volume pulse, no murmur  Gastrointestinal: Full abdomen, soft, nontender to " palpate  Integumentary : Warm, Dry and no rash    Assessment:     1. Acute non-recurrent pansinusitis    2. Sinus congestion      Plan:   Discussed the condition and course in detail.  Monitor the symptoms.  Adequate hydration.  Antihistamine of choice like Claritin Zyrtec or Allegra for congestion  Celestone IM today as anti inflammation for symptom relief, risk and benefits discussed voiced understanding  Antibiotics prescribed today, to start medication only with signs of infection.  Voiced understanding  Tylenol or ibuprofen as needed for pain and headache  Call or return to clinic for any questions    COVID-19 test negative, flu test negative    Acute non-recurrent pansinusitis  -     betamethasone acetate-betamethasone sodium phosphate injection 9 mg  -     cefdinir (OMNICEF) 300 MG capsule; Take 1 capsule (300 mg total) by mouth 2 (two) times daily. for 7 days  Dispense: 14 capsule; Refill: 0    Sinus congestion  -     POCT COVID-19 Rapid Screening  -     POCT Influenza A/B Molecular

## 2024-07-19 NOTE — PATIENT INSTRUCTIONS
Discussed the condition and course in detail.  Monitor the symptoms.  Adequate hydration.  Antihistamine of choice like Claritin Zyrtec or Allegra for congestion  Celestone IM today as anti inflammation for symptom relief, risk and benefits discussed voiced understanding  Antibiotics prescribed today, to start medication only with signs of infection.  Voiced understanding  Tylenol or ibuprofen as needed for pain and headache  Call or return to clinic for any questions    COVID-19 test negative, flu test negative

## 2024-07-26 ENCOUNTER — LAB VISIT (OUTPATIENT)
Dept: LAB | Facility: HOSPITAL | Age: 54
End: 2024-07-26
Attending: STUDENT IN AN ORGANIZED HEALTH CARE EDUCATION/TRAINING PROGRAM
Payer: COMMERCIAL

## 2024-07-26 DIAGNOSIS — N95.1 MENOPAUSAL SYNDROME: ICD-10-CM

## 2024-07-26 DIAGNOSIS — E55.9 VITAMIN D DEFICIENCY: ICD-10-CM

## 2024-07-26 DIAGNOSIS — Z76.89 ENCOUNTER TO ESTABLISH CARE: ICD-10-CM

## 2024-07-26 DIAGNOSIS — E66.01 SEVERE OBESITY (BMI 35.0-39.9) WITH COMORBIDITY: ICD-10-CM

## 2024-07-26 DIAGNOSIS — E78.2 MIXED HYPERLIPIDEMIA: ICD-10-CM

## 2024-07-26 DIAGNOSIS — E78.49 OTHER HYPERLIPIDEMIA: ICD-10-CM

## 2024-07-26 DIAGNOSIS — R73.03 PREDIABETES: ICD-10-CM

## 2024-07-26 LAB
25(OH)D3+25(OH)D2 SERPL-MCNC: 53 NG/ML (ref 30–80)
ALBUMIN SERPL-MCNC: 3.9 G/DL (ref 3.5–5)
ALBUMIN/GLOB SERPL: 1.2 RATIO (ref 1.1–2)
ALP SERPL-CCNC: 79 UNIT/L (ref 40–150)
ALT SERPL-CCNC: 28 UNIT/L (ref 0–55)
ANION GAP SERPL CALC-SCNC: 7 MEQ/L
AST SERPL-CCNC: 19 UNIT/L (ref 5–34)
BASOPHILS # BLD AUTO: 0.07 X10(3)/MCL
BASOPHILS NFR BLD AUTO: 0.5 %
BILIRUB SERPL-MCNC: 0.4 MG/DL
BUN SERPL-MCNC: 14.4 MG/DL (ref 9.8–20.1)
CALCIUM SERPL-MCNC: 9.6 MG/DL (ref 8.4–10.2)
CHLORIDE SERPL-SCNC: 103 MMOL/L (ref 98–107)
CHOLEST SERPL-MCNC: 224 MG/DL
CHOLEST/HDLC SERPL: 4 {RATIO} (ref 0–5)
CO2 SERPL-SCNC: 29 MMOL/L (ref 22–29)
CREAT SERPL-MCNC: 0.78 MG/DL (ref 0.55–1.02)
CREAT/UREA NIT SERPL: 18
EOSINOPHIL # BLD AUTO: 0.2 X10(3)/MCL (ref 0–0.9)
EOSINOPHIL NFR BLD AUTO: 1.5 %
ERYTHROCYTE [DISTWIDTH] IN BLOOD BY AUTOMATED COUNT: 14.9 % (ref 11.5–17)
EST. AVERAGE GLUCOSE BLD GHB EST-MCNC: 114 MG/DL
ESTRADIOL SERPL HS-MCNC: <24 PG/ML
FSH SERPL-ACNC: 75.74 MIU/ML
GFR SERPLBLD CREATININE-BSD FMLA CKD-EPI: >60 ML/MIN/1.73/M2
GLOBULIN SER-MCNC: 3.3 GM/DL (ref 2.4–3.5)
GLUCOSE SERPL-MCNC: 87 MG/DL (ref 74–100)
HBA1C MFR BLD: 5.6 %
HCT VFR BLD AUTO: 40.1 % (ref 37–47)
HCV AB SERPL QL IA: NONREACTIVE
HDLC SERPL-MCNC: 51 MG/DL (ref 35–60)
HGB BLD-MCNC: 12.6 G/DL (ref 12–16)
HIV 1+2 AB+HIV1 P24 AG SERPL QL IA: NONREACTIVE
IMM GRANULOCYTES # BLD AUTO: 0.05 X10(3)/MCL (ref 0–0.04)
IMM GRANULOCYTES NFR BLD AUTO: 0.4 %
LDLC SERPL CALC-MCNC: 128 MG/DL (ref 50–140)
LH SERPL-ACNC: 27.3 MIU/ML
LYMPHOCYTES # BLD AUTO: 4.82 X10(3)/MCL (ref 0.6–4.6)
LYMPHOCYTES NFR BLD AUTO: 35.7 %
MCH RBC QN AUTO: 28.6 PG (ref 27–31)
MCHC RBC AUTO-ENTMCNC: 31.4 G/DL (ref 33–36)
MCV RBC AUTO: 91.1 FL (ref 80–94)
MONOCYTES # BLD AUTO: 1.23 X10(3)/MCL (ref 0.1–1.3)
MONOCYTES NFR BLD AUTO: 9.1 %
NEUTROPHILS # BLD AUTO: 7.13 X10(3)/MCL (ref 2.1–9.2)
NEUTROPHILS NFR BLD AUTO: 52.8 %
NRBC BLD AUTO-RTO: 0 %
PLATELET # BLD AUTO: 293 X10(3)/MCL (ref 130–400)
PMV BLD AUTO: 11.8 FL (ref 7.4–10.4)
POTASSIUM SERPL-SCNC: 4.8 MMOL/L (ref 3.5–5.1)
PROT SERPL-MCNC: 7.2 GM/DL (ref 6.4–8.3)
RBC # BLD AUTO: 4.4 X10(6)/MCL (ref 4.2–5.4)
SODIUM SERPL-SCNC: 139 MMOL/L (ref 136–145)
T4 FREE SERPL-MCNC: 0.87 NG/DL (ref 0.7–1.48)
TRIGL SERPL-MCNC: 225 MG/DL (ref 37–140)
TSH SERPL-ACNC: 1.28 UIU/ML (ref 0.35–4.94)
VLDLC SERPL CALC-MCNC: 45 MG/DL
WBC # BLD AUTO: 13.5 X10(3)/MCL (ref 4.5–11.5)

## 2024-07-26 PROCEDURE — 83001 ASSAY OF GONADOTROPIN (FSH): CPT

## 2024-07-26 PROCEDURE — 83036 HEMOGLOBIN GLYCOSYLATED A1C: CPT

## 2024-07-26 PROCEDURE — 36415 COLL VENOUS BLD VENIPUNCTURE: CPT

## 2024-07-26 PROCEDURE — 82306 VITAMIN D 25 HYDROXY: CPT

## 2024-07-26 PROCEDURE — 86803 HEPATITIS C AB TEST: CPT

## 2024-07-26 PROCEDURE — 80061 LIPID PANEL: CPT

## 2024-07-26 PROCEDURE — 87389 HIV-1 AG W/HIV-1&-2 AB AG IA: CPT

## 2024-07-26 PROCEDURE — 80053 COMPREHEN METABOLIC PANEL: CPT

## 2024-07-26 PROCEDURE — 84439 ASSAY OF FREE THYROXINE: CPT

## 2024-07-26 PROCEDURE — 84443 ASSAY THYROID STIM HORMONE: CPT

## 2024-07-26 PROCEDURE — 82670 ASSAY OF TOTAL ESTRADIOL: CPT

## 2024-07-26 PROCEDURE — 83002 ASSAY OF GONADOTROPIN (LH): CPT

## 2024-07-26 PROCEDURE — 85025 COMPLETE CBC W/AUTO DIFF WBC: CPT

## 2024-07-29 ENCOUNTER — E-VISIT (OUTPATIENT)
Dept: PRIMARY CARE CLINIC | Facility: CLINIC | Age: 54
End: 2024-07-29
Payer: COMMERCIAL

## 2024-07-29 DIAGNOSIS — B37.9 YEAST INFECTION: Primary | ICD-10-CM

## 2024-07-29 DIAGNOSIS — I47.10 SUPRAVENTRICULAR TACHYCARDIA: ICD-10-CM

## 2024-07-29 RX ORDER — FLUCONAZOLE 150 MG/1
150 TABLET ORAL ONCE
Qty: 1 TABLET | Refills: 0 | Status: SHIPPED | OUTPATIENT
Start: 2024-07-29 | End: 2024-07-29

## 2024-07-29 NOTE — PROGRESS NOTES
Date: 07/29/2024  Patient ID: [id number]   Chief Complaint: Urinary Tract Infection (Entered automatically based on patient selection in [employer].)    HPI:   Jadyn Wilder is a 54 y.o. female here today for Urinary Tract Infection (Entered automatically based on patient selection in [employer].)  Pt just completed abx for sinus infection and developed yeast infection sx.     Past Medical History:   Diagnosis Date    ADD (attention deficit disorder)     Anxiety     and depression    Binge eating 9/29/2023    Closed trimalleolar fracture of right ankle with routine healing 06/22/2022    Finger pain, right 06/18/2023    Hypertension     right finger 06/18/2023    Screening for malignant neoplasm of colon     Segmental and somatic dysfunction     Spondylosis of cervical region without myelopathy or radiculopathy     Vitamin D deficiency 07/11/2023     No outpatient medications have been marked as taking for the 7/29/24 encounter (E-Visit) with Jazmín Guerra MD.     Past Surgical History:   Procedure Laterality Date    FRACTURE SURGERY  06/24/2022    Broken right ankle    KNEE ARTHROSCOPY      OPEN REDUCTION AND INTERNAL FIXATION (ORIF) OF INJURY OF ANKLE Right 06/23/2022    Procedure: ORIF, ANKLE-will start prone-Synthes;  Surgeon: Ezekiel Emery Jr., MD;  [facility]: [facility];  Service: Orthopedics;  Laterality: Right;    TUBAL LIGATION       Review of patient's allergies indicates:  No Known Allergies  Family History   Problem Relation Name Age of Onset    Diabetes Mother Vanessa Wilder     Hypertension Father Santos Wilder     Skin cancer Father Santos Wilder     No Known Problems Sister      No Known Problems Brother      Colon cancer Maternal Grandmother      Pancreatic cancer Maternal Grandfather        Social History     Socioeconomic History    Marital status: Significant Other   Occupational History    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   Tobacco Use    Smoking status: Former     Types: Cigarettes     Passive exposure: Never     Smokeless tobacco: Never    Tobacco comments:     Quit >15yrs ago   Substance and Sexual Activity    Alcohol use: Not Currently    Drug use: Never    Sexual activity: Yes     Partners: Male     Birth control/protection: See Surgical Hx     Comment: valerie   Social History Narrative    Has granddaughter 5mos olf     Social Determinants of Health     Financial Resource Strain: Low Risk  (2/6/2024)    Overall Financial Resource Strain (CARDIA)     Difficulty of Paying Living Expenses: Not very hard   Food Insecurity: No Food Insecurity (2/6/2024)    Hunger Vital Sign     Worried About Running Out of Food in the Last Year: Never true     Ran Out of Food in the Last Year: Never true   Transportation Needs: No Transportation Needs (2/6/2024)    PRAPARE - Transportation     Lack of Transportation (Medical): No     Lack of Transportation (Non-Medical): No   Physical Activity: Inactive (2/6/2024)    Exercise Vital Sign     Days of Exercise per Week: 0 days     Minutes of Exercise per Session: 0 min   Stress: No Stress Concern Present (2/6/2024)    [employer] - Occupational Stress Questionnaire     Feeling of Stress : Not at all   Housing Stability: Low Risk  (2/6/2024)    Housing Stability Vital Sign     Unable to Pay for Housing in the Last Year: No     Number of Places Lived in the Last Year: 1     Unstable Housing in the Last Year: No     Patient Care Team:  Jazmín Guerra MD as PCP - General (Family Medicine)  Nida Waldrop MD as Hypertension Digital Medicine Responsible Provider (Family Medicine)  Robin Montiel as Digital Medicine Health   Maryuri Andersen, PharmD as Hypertension Digital Medicine Clinician (Pharmacist)  1, [employer] [facility] as Hypertension Digital Medicine Contract  Jazmín Guerra MD as Hyperlipidemia Digital Medicine Responsible Provider (Family Medicine)  Maryuri Andersen, PharmD as Hyperlipidemia Digital Medicine Clinician  (Pharmacist)  Sania Flores, PharmD as Pharmacist (Pharmacist)   Assessment:       ICD-10-CM ICD-9-CM   1. Yeast infection  B37.9 112.9   2. Supraventricular tachycardia  I47.10 427.89      Plan:   1. Yeast infection  Assessment & Plan:  Attempt Diflucan 150mg po x1  Call if sx progress    Orders:  -     fluconazole (DIFLUCAN) 150 MG Tab; Take 1 tablet (150 mg total) by mouth once. Do not take with Tylenol, medications that may affect liver.  Stop statin for few days if taking.  Might cause anaphylaxis, nausea, headache, rash, vomiting, abdominal pain, dizziness, etc. for 1 dose  Dispense: 1 tablet; Refill: 0    2. Supraventricular tachycardia  Assessment & Plan:  Stable  Continue to monitor           Medication List with Changes/Refills   New Medications    FLUCONAZOLE (DIFLUCAN) 150 MG TAB    Take 1 tablet (150 mg total) by mouth once. Do not take with Tylenol, medications that may affect liver.  Stop statin for few days if taking.  Might cause anaphylaxis, nausea, headache, rash, vomiting, abdominal pain, dizziness, etc. for 1 dose       Start Date: 7/29/2024 End Date: 7/29/2024   Current Medications    AMLODIPINE-VALSARTAN (EXFORGE) 5-160 MG PER TABLET    Take 1 tablet by mouth once daily.       Start Date: 7/11/2023 End Date: --    ATORVASTATIN (LIPITOR) 10 MG TABLET    Take 1 tablet (10 mg total) by mouth once daily.       Start Date: 5/8/2024  End Date: 5/8/2025    DICLOFENAC (VOLTAREN) 75 MG EC TABLET    Take 1 tablet (75 mg total) by mouth 2 (two) times daily.       Start Date: 9/11/2023 End Date: --    FLUOXETINE 40 MG CAPSULE    Take 1 capsule (40 mg total) by mouth once daily.       Start Date: 2/29/2024 End Date: --    LISDEXAMFETAMINE (VYVANSE) 50 MG CAPSULE    Take 1 capsule (50 mg total) by mouth every morning.       Start Date: 7/8/2024  End Date: --    MUPIROCIN (BACTROBAN) 2 % OINTMENT    Apply topically 3 (three) times daily.       Start Date: 8/10/2023 End Date: --    PROPRANOLOL (INDERAL) 10  MG TABLET    Take 1 tablet (10 mg total) by mouth daily as needed.       Start Date: 7/11/2023 End Date: --    SEMAGLUTIDE, WEIGHT LOSS, (WEGOVY) 0.25 MG/0.5 ML PNIJ    Inject 1 pen (0.25 mg) into the skin every 7 days.       Start Date: 7/18/2024 End Date: --    SEMAGLUTIDE, WEIGHT LOSS, (WEGOVY) 0.5 MG/0.5 ML PNIJ    Inject 0.5 mg into the skin every 7 days.       Start Date: 8/15/2024 End Date: --    SEMAGLUTIDE, WEIGHT LOSS, (WEGOVY) 1 MG/0.5 ML PNIJ    Inject 1 mg into the skin every 7 days.       Start Date: 9/12/2024 End Date: --        5-10 minutes spent on chart review and assessment/plan  Follow up if symptoms worsen or fail to improve. In addition to their scheduled follow up, the patient has also been instructed to follow up on as needed basis.

## 2024-08-09 ENCOUNTER — OFFICE VISIT (OUTPATIENT)
Dept: PRIMARY CARE CLINIC | Facility: CLINIC | Age: 54
End: 2024-08-09
Payer: COMMERCIAL

## 2024-08-09 VITALS
HEART RATE: 87 BPM | OXYGEN SATURATION: 98 % | DIASTOLIC BLOOD PRESSURE: 67 MMHG | SYSTOLIC BLOOD PRESSURE: 109 MMHG | HEIGHT: 65 IN | BODY MASS INDEX: 39.04 KG/M2 | WEIGHT: 234.31 LBS

## 2024-08-09 DIAGNOSIS — Z00.00 WELLNESS EXAMINATION: Primary | ICD-10-CM

## 2024-08-09 DIAGNOSIS — F32.A ANXIETY AND DEPRESSION: ICD-10-CM

## 2024-08-09 DIAGNOSIS — F98.8 ATTENTION DEFICIT DISORDER, UNSPECIFIED HYPERACTIVITY PRESENCE: ICD-10-CM

## 2024-08-09 DIAGNOSIS — D72.829 LEUKOCYTOSIS, UNSPECIFIED TYPE: ICD-10-CM

## 2024-08-09 DIAGNOSIS — I47.10 SUPRAVENTRICULAR TACHYCARDIA: ICD-10-CM

## 2024-08-09 DIAGNOSIS — F41.9 ANXIETY AND DEPRESSION: ICD-10-CM

## 2024-08-09 DIAGNOSIS — E66.9 CLASS 2 OBESITY WITH BODY MASS INDEX (BMI) OF 38.0 TO 38.9 IN ADULT, UNSPECIFIED OBESITY TYPE, UNSPECIFIED WHETHER SERIOUS COMORBIDITY PRESENT: ICD-10-CM

## 2024-08-09 DIAGNOSIS — E78.2 MIXED HYPERLIPIDEMIA: ICD-10-CM

## 2024-08-09 DIAGNOSIS — I10 PRIMARY HYPERTENSION: ICD-10-CM

## 2024-08-09 PROBLEM — R82.2 BILIRUBINURIA: Status: RESOLVED | Noted: 2023-09-11 | Resolved: 2024-08-09

## 2024-08-09 PROBLEM — U07.1 COVID: Status: RESOLVED | Noted: 2023-12-20 | Resolved: 2024-08-09

## 2024-08-09 PROBLEM — B37.9 YEAST INFECTION: Status: RESOLVED | Noted: 2024-07-29 | Resolved: 2024-08-09

## 2024-08-09 PROBLEM — E55.9 VITAMIN D DEFICIENCY: Status: RESOLVED | Noted: 2023-07-11 | Resolved: 2024-08-09

## 2024-08-09 PROBLEM — E78.1 PURE HYPERTRIGLYCERIDEMIA: Status: RESOLVED | Noted: 2023-07-11 | Resolved: 2024-08-09

## 2024-08-09 PROBLEM — N30.00 ACUTE CYSTITIS WITHOUT HEMATURIA: Status: RESOLVED | Noted: 2023-09-11 | Resolved: 2024-08-09

## 2024-08-09 RX ORDER — LISDEXAMFETAMINE DIMESYLATE 50 MG/1
50 CAPSULE ORAL EVERY MORNING
Qty: 30 CAPSULE | Refills: 0 | Status: SHIPPED | OUTPATIENT
Start: 2024-08-09

## 2024-08-09 RX ORDER — LISDEXAMFETAMINE DIMESYLATE 50 MG/1
50 CAPSULE ORAL EVERY MORNING
Qty: 30 CAPSULE | Refills: 0 | Status: SHIPPED | OUTPATIENT
Start: 2024-09-09

## 2024-08-09 RX ORDER — LISDEXAMFETAMINE DIMESYLATE 50 MG/1
50 CAPSULE ORAL EVERY MORNING
Qty: 30 CAPSULE | Refills: 0 | Status: SHIPPED | OUTPATIENT
Start: 2024-10-09

## 2024-08-29 ENCOUNTER — E-VISIT (OUTPATIENT)
Dept: PRIMARY CARE CLINIC | Facility: CLINIC | Age: 54
End: 2024-08-29
Payer: COMMERCIAL

## 2024-08-29 DIAGNOSIS — K21.9 GASTROESOPHAGEAL REFLUX DISEASE, UNSPECIFIED WHETHER ESOPHAGITIS PRESENT: Primary | ICD-10-CM

## 2024-08-29 RX ORDER — FAMOTIDINE 40 MG/1
40 TABLET, FILM COATED ORAL DAILY
Qty: 30 TABLET | Refills: 11 | Status: SHIPPED | OUTPATIENT
Start: 2024-08-29 | End: 2025-08-29

## 2024-08-29 NOTE — ASSESSMENT & PLAN NOTE
Attempt Pepcid 40mg qam  Can use OTC antacid medications  Drink plenty of fluids  Encourage weight loss  Avoid triggering foods  Avoid tobacco if using  No eating for 2-3 hours before bedtime  Elevate head of bed to 30 degrees  If symptoms worsen, consider endoscopy

## 2024-08-29 NOTE — PROGRESS NOTES
Date: 08/29/2024  Patient ID: [id number]   Chief Complaint: General Illness (Entered automatically based on patient selection in [employer].)    HPI:   Jadyn Wilder is a 54 y.o. female here today for General Illness (Entered automatically based on patient selection in [employer].)      Past Medical History:   Diagnosis Date    Acute cystitis without hematuria 09/11/2023    Resolved      ADD (attention deficit disorder)     Anxiety     and depression    Bilirubinuria 09/11/2023    Obtain CMP      Binge eating 09/29/2023    Closed trimalleolar fracture of right ankle with routine healing 06/22/2022    COVID 12/20/2023    Finger pain, right 06/18/2023    Hypertension     right finger 06/18/2023    Screening for malignant neoplasm of colon     Segmental and somatic dysfunction     Spondylosis of cervical region without myelopathy or radiculopathy     Vitamin D deficiency 07/11/2023    Yeast infection 07/29/2024     No outpatient medications have been marked as taking for the 8/29/24 encounter (E-Visit) with Jazmín Guerra MD.     Past Surgical History:   Procedure Laterality Date    FRACTURE SURGERY  06/24/2022    Broken right ankle    KNEE ARTHROSCOPY      OPEN REDUCTION AND INTERNAL FIXATION (ORIF) OF INJURY OF ANKLE Right 06/23/2022    Procedure: ORIF, ANKLE-will start prone-Synthes;  Surgeon: Ezekiel Emery Jr., MD;  Location: [facility];  Service: Orthopedics;  Laterality: Right;    TUBAL LIGATION       Review of patient's allergies indicates:  No Known Allergies  Family History   Problem Relation Name Age of Onset    Diabetes Mother Vanessa Wilder     Hypertension Father Santos Wilder     Skin cancer Father Santos Wilder     No Known Problems Sister      No Known Problems Brother      Colon cancer Maternal Grandmother      Pancreatic cancer Maternal Grandfather        Social History     Socioeconomic History    Marital status: Significant Other   Occupational History    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   Tobacco Use    Smoking  status: Former     Types: Cigarettes     Passive exposure: Never    Smokeless tobacco: Never    Tobacco comments:     Quit >15yrs ago   Substance and Sexual Activity    Alcohol use: Not Currently    Drug use: Never    Sexual activity: Yes     Partners: Male     Birth control/protection: See Surgical Hx     Comment: valerie   Social History Narrative    Has granddaughter 5mos olf     Social Determinants of Health     Financial Resource Strain: Low Risk  (8/9/2024)    Overall Financial Resource Strain (CARDIA)     Difficulty of Paying Living Expenses: Not hard at all   Food Insecurity: No Food Insecurity (8/9/2024)    Hunger Vital Sign     Worried About Running Out of Food in the Last Year: Never true     Ran Out of Food in the Last Year: Never true   Transportation Needs: No Transportation Needs (8/9/2024)    TRANSPORTATION NEEDS     Transportation : No   Physical Activity: Inactive (8/9/2024)    Exercise Vital Sign     Days of Exercise per Week: 0 days     Minutes of Exercise per Session: 0 min   Stress: No Stress Concern Present (8/9/2024)    [employer] [place] [employer] - Occupational Stress Questionnaire     Feeling of Stress : Not at all   Housing Stability: Low Risk  (8/9/2024)    Housing Stability Vital Sign     Unable to Pay for Housing in the Last Year: No     Homeless in the Last Year: No     Patient Care Team:  Jazmín Guerra MD as PCP - General (Family Medicine)  Nida Waldrop MD as Hypertension Digital Medicine Responsible Provider (Family Medicine)  Robin Montiel as Digital Medicine Health   Maryuri Andersen, PharmD as Hypertension Digital Medicine Clinician (Pharmacist)  1, [employer] Employee Plan - [employer] as Hypertension Digital Medicine Contract  Jazmín Guerra MD as Hyperlipidemia Digital Medicine Responsible Provider (Family Medicine)  Maryuri Andersen, PharmD as Hyperlipidemia Digital Medicine Clinician (Pharmacist)  Sania Flores, PharmD as Pharmacist (Pharmacist)    Assessment:       ICD-10-CM ICD-9-CM   1. Gastroesophageal reflux disease, unspecified whether esophagitis present  K21.9 530.81      Plan:   1. Gastroesophageal reflux disease, unspecified whether esophagitis present  Assessment & Plan:  Attempt Pepcid 40mg qam  Can use OTC antacid medications  Drink plenty of fluids  Encourage weight loss  Avoid triggering foods  Avoid tobacco if using  No eating for 2-3 hours before bedtime  Elevate head of bed to 30 degrees  If symptoms worsen, consider endoscopy       Orders:  -     famotidine (PEPCID) 40 MG tablet; Take 1 tablet (40 mg total) by mouth once daily.  Dispense: 30 tablet; Refill: 11         Medication List with Changes/Refills   New Medications    FAMOTIDINE (PEPCID) 40 MG TABLET    Take 1 tablet (40 mg total) by mouth once daily.       Start Date: 8/29/2024 End Date: 8/29/2025   Current Medications    AMLODIPINE-VALSARTAN (EXFORGE) 5-160 MG PER TABLET    Take 1 tablet by mouth once daily.       Start Date: 7/11/2023 End Date: --    ATORVASTATIN (LIPITOR) 10 MG TABLET    Take 1 tablet (10 mg total) by mouth once daily.       Start Date: 5/8/2024  End Date: 5/8/2025    DICLOFENAC (VOLTAREN) 75 MG EC TABLET    Take 1 tablet (75 mg total) by mouth 2 (two) times daily.       Start Date: 9/11/2023 End Date: --    FLUOXETINE 40 MG CAPSULE    Take 1 capsule (40 mg total) by mouth once daily.       Start Date: 2/29/2024 End Date: --    LISDEXAMFETAMINE (VYVANSE) 50 MG CAPSULE    Take 1 capsule (50 mg total) by mouth every morning.       Start Date: 8/9/2024  End Date: --    LISDEXAMFETAMINE (VYVANSE) 50 MG CAPSULE    Take 1 capsule (50 mg total) by mouth every morning.       Start Date: 9/9/2024  End Date: --    LISDEXAMFETAMINE (VYVANSE) 50 MG CAPSULE    Take 1 capsule (50 mg total) by mouth every morning.       Start Date: 10/9/2024 End Date: --    MUPIROCIN (BACTROBAN) 2 % OINTMENT    Apply topically 3 (three) times daily.       Start Date: 8/10/2023 End Date: --     PROPRANOLOL (INDERAL) 10 MG TABLET    Take 1 tablet (10 mg total) by mouth daily as needed.       Start Date: 7/11/2023 End Date: --    SEMAGLUTIDE, WEIGHT LOSS, (WEGOVY) 0.5 MG/0.5 ML PNIJ    Inject 1 pen (0.5 mg) into the skin every 7 days.       Start Date: 8/15/2024 End Date: --    SEMAGLUTIDE, WEIGHT LOSS, (WEGOVY) 1 MG/0.5 ML PNIJ    Inject 1 mg into the skin every 7 days.       Start Date: 9/12/2024 End Date: --        5-10 minutes spent on chart review and assessment/plan  No follow-ups on file. In addition to their scheduled follow up, the patient has also been instructed to follow up on as needed basis.

## 2024-09-24 ENCOUNTER — OFFICE VISIT (OUTPATIENT)
Dept: PRIMARY CARE CLINIC | Facility: CLINIC | Age: 54
End: 2024-09-24
Payer: COMMERCIAL

## 2024-09-24 VITALS
DIASTOLIC BLOOD PRESSURE: 79 MMHG | OXYGEN SATURATION: 99 % | BODY MASS INDEX: 38.49 KG/M2 | SYSTOLIC BLOOD PRESSURE: 123 MMHG | HEART RATE: 109 BPM | WEIGHT: 231 LBS | HEIGHT: 65 IN

## 2024-09-24 DIAGNOSIS — M67.911 TENDINOPATHY OF RIGHT SHOULDER: Primary | ICD-10-CM

## 2024-09-24 DIAGNOSIS — R11.0 NAUSEA: ICD-10-CM

## 2024-09-24 PROCEDURE — 96372 THER/PROPH/DIAG INJ SC/IM: CPT | Mod: ,,, | Performed by: STUDENT IN AN ORGANIZED HEALTH CARE EDUCATION/TRAINING PROGRAM

## 2024-09-24 PROCEDURE — 3074F SYST BP LT 130 MM HG: CPT | Mod: CPTII,,, | Performed by: STUDENT IN AN ORGANIZED HEALTH CARE EDUCATION/TRAINING PROGRAM

## 2024-09-24 PROCEDURE — 99214 OFFICE O/P EST MOD 30 MIN: CPT | Mod: 25,,, | Performed by: STUDENT IN AN ORGANIZED HEALTH CARE EDUCATION/TRAINING PROGRAM

## 2024-09-24 PROCEDURE — 3008F BODY MASS INDEX DOCD: CPT | Mod: CPTII,,, | Performed by: STUDENT IN AN ORGANIZED HEALTH CARE EDUCATION/TRAINING PROGRAM

## 2024-09-24 PROCEDURE — 1159F MED LIST DOCD IN RCRD: CPT | Mod: CPTII,,, | Performed by: STUDENT IN AN ORGANIZED HEALTH CARE EDUCATION/TRAINING PROGRAM

## 2024-09-24 PROCEDURE — 1160F RVW MEDS BY RX/DR IN RCRD: CPT | Mod: CPTII,,, | Performed by: STUDENT IN AN ORGANIZED HEALTH CARE EDUCATION/TRAINING PROGRAM

## 2024-09-24 PROCEDURE — 4010F ACE/ARB THERAPY RXD/TAKEN: CPT | Mod: CPTII,,, | Performed by: STUDENT IN AN ORGANIZED HEALTH CARE EDUCATION/TRAINING PROGRAM

## 2024-09-24 PROCEDURE — 3044F HG A1C LEVEL LT 7.0%: CPT | Mod: CPTII,,, | Performed by: STUDENT IN AN ORGANIZED HEALTH CARE EDUCATION/TRAINING PROGRAM

## 2024-09-24 PROCEDURE — 3078F DIAST BP <80 MM HG: CPT | Mod: CPTII,,, | Performed by: STUDENT IN AN ORGANIZED HEALTH CARE EDUCATION/TRAINING PROGRAM

## 2024-09-24 RX ORDER — KETOROLAC TROMETHAMINE 10 MG/1
10 TABLET, FILM COATED ORAL EVERY 6 HOURS
Qty: 20 TABLET | Refills: 0 | Status: SHIPPED | OUTPATIENT
Start: 2024-09-24 | End: 2024-09-29

## 2024-09-24 RX ORDER — ONDANSETRON 8 MG/1
8 TABLET, ORALLY DISINTEGRATING ORAL EVERY 12 HOURS PRN
Qty: 10 TABLET | Refills: 0 | Status: SHIPPED | OUTPATIENT
Start: 2024-09-24

## 2024-09-24 RX ORDER — METHOCARBAMOL 500 MG/1
500 TABLET, FILM COATED ORAL 4 TIMES DAILY
Qty: 40 TABLET | Refills: 0 | Status: SHIPPED | OUTPATIENT
Start: 2024-09-24 | End: 2024-10-04

## 2024-09-24 RX ORDER — KETOROLAC TROMETHAMINE 30 MG/ML
60 INJECTION, SOLUTION INTRAMUSCULAR; INTRAVENOUS ONCE
Status: COMPLETED | OUTPATIENT
Start: 2024-09-24 | End: 2024-09-24

## 2024-09-24 RX ADMIN — KETOROLAC TROMETHAMINE 60 MG: 30 INJECTION, SOLUTION INTRAMUSCULAR; INTRAVENOUS at 04:09

## 2024-09-24 NOTE — ASSESSMENT & PLAN NOTE
Toradol injection in office  Attempt Robaxin and Toradol po prn  Cont warm compress  HEP and stretches  Consider PT/XR if worsen

## 2024-09-24 NOTE — PROGRESS NOTES
Date: 09/24/2024  Patient ID: [id number]   Chief Complaint: Neck Pain (Right sided neck pain radiating down shoulder and arm, went to chiropractor, used heating pad with no relief, unable to sleep at night)    HPI:   Jadyn Wilder is a 54 y.o. female here today for Neck Pain (Right sided neck pain radiating down shoulder and arm, went to chiropractor, used heating pad with no relief, unable to sleep at night)  Started 2wks ago from possible poor sleep position. Pain is constant 7-10/10, radiating down R arm, worsens when she looks up, laying at night. Denies weakness but has tingling. She went to chiropractor with no improved, tried heating pad/ibuprofen with minimal relief. She does take Voltaren for ankle with minimal relief for shoulder. She also needs anti-nausea meds since Semaglutide causes nausea.     Patient Active Problem List   Diagnosis    Wellness examination    Primary hypertension    Anxiety and depression    ADD (attention deficit disorder)    Spondylosis of cervical region without myelopathy or radiculopathy    Menopausal syndrome    Class 2 obesity with body mass index (BMI) of 38.0 to 38.9 in adult    Prediabetes    Mixed hyperlipidemia    Back pain    Binge eating    Supraventricular tachycardia    Leukocytosis    Gastroesophageal reflux disease    Tendinopathy of right shoulder     Outpatient Medications Marked as Taking for the 9/24/24 encounter (Office Visit) with Jazmín Guerra MD   Medication Sig Dispense Refill    amlodipine-valsartan (EXFORGE) 5-160 mg per tablet Take 1 tablet by mouth once daily. 90 tablet 3    atorvastatin (LIPITOR) 10 MG tablet Take 1 tablet (10 mg total) by mouth once daily. 90 tablet 3    diclofenac (VOLTAREN) 75 MG EC tablet Take 1 tablet (75 mg total) by mouth 2 (two) times daily. 60 tablet 11    famotidine (PEPCID) 40 MG tablet Take 1 tablet (40 mg total) by mouth once daily. 30 tablet 11    FLUoxetine 40 MG capsule Take 1 capsule (40 mg total) by mouth once  daily. 910 capsule 3    lisdexamfetamine (VYVANSE) 50 MG capsule Take 1 capsule (50 mg total) by mouth every morning. 30 capsule 0    lisdexamfetamine (VYVANSE) 50 MG capsule Take 1 capsule (50 mg total) by mouth every morning. 30 capsule 0    [START ON 10/9/2024] lisdexamfetamine (VYVANSE) 50 MG capsule Take 1 capsule (50 mg total) by mouth every morning. 30 capsule 0    mupirocin (BACTROBAN) 2 % ointment Apply topically 3 (three) times daily. 30 g 1    propranoloL (INDERAL) 10 MG tablet Take 1 tablet (10 mg total) by mouth daily as needed. 30 tablet 11    semaglutide, weight loss, (WEGOVY) 1 mg/0.5 mL PnIj Inject 1 pen (1 mg) into the skin every 7 days. 2 mL 0     Current Facility-Administered Medications for the 9/24/24 encounter (Office Visit) with Jazmín Guerra MD   Medication Dose Route Frequency Provider Last Rate Last Admin    [COMPLETED] ketorolac injection 60 mg  60 mg Intramuscular Once    60 mg at 09/24/24 1613     Past Medical History:   Diagnosis Date    Acute cystitis without hematuria 09/11/2023    Resolved      ADD (attention deficit disorder)     Anxiety     and depression    Bilirubinuria 09/11/2023    Obtain CMP      Binge eating 09/29/2023    Closed trimalleolar fracture of right ankle with routine healing 06/22/2022    COVID 12/20/2023    Finger pain, right 06/18/2023    Hypertension     right finger 06/18/2023    Screening for malignant neoplasm of colon     Segmental and somatic dysfunction     Spondylosis of cervical region without myelopathy or radiculopathy     Vitamin D deficiency 07/11/2023    Yeast infection 07/29/2024     Past Surgical History:   Procedure Laterality Date    FRACTURE SURGERY  06/24/2022    Broken right ankle    KNEE ARTHROSCOPY      OPEN REDUCTION AND INTERNAL FIXATION (ORIF) OF INJURY OF ANKLE Right 06/23/2022    Procedure: ORIF, ANKLE-will start prone-Synthes;  Surgeon: Ezekiel Emery Jr., MD;  Location: [facility];  Service: Orthopedics;  Laterality: Right;    TUBAL  LIGATION       Review of patient's allergies indicates:  No Known Allergies  Family History   Problem Relation Name Age of Onset    Diabetes Mother Vanessa Wilder     Hypertension Father Santos Wilder     Skin cancer Father Santos Wilder     No Known Problems Sister      No Known Problems Brother      Colon cancer Maternal Grandmother      Pancreatic cancer Maternal Grandfather        Social History     Socioeconomic History    Marital status: Significant Other   Occupational History    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   Tobacco Use    Smoking status: Former     Types: Cigarettes     Passive exposure: Never    Smokeless tobacco: Never    Tobacco comments:     Quit >15yrs ago   Substance and Sexual Activity    Alcohol use: Not Currently    Drug use: Never    Sexual activity: Yes     Partners: Male     Birth control/protection: See Surgical Hx     Comment: valerie   Social History Narrative    Has granddaughter 5mos olf     Social Determinants of Health     Financial Resource Strain: Low Risk  (8/9/2024)    Overall Financial Resource Strain (CARDIA)     Difficulty of Paying Living Expenses: Not hard at all   Food Insecurity: No Food Insecurity (8/9/2024)    Hunger Vital Sign     Worried About Running Out of Food in the Last Year: Never true     Ran Out of Food in the Last Year: Never true   Transportation Needs: No Transportation Needs (8/9/2024)    TRANSPORTATION NEEDS     Transportation : No   Physical Activity: Inactive (8/9/2024)    Exercise Vital Sign     Days of Exercise per Week: 0 days     Minutes of Exercise per Session: 0 min   Stress: No Stress Concern Present (8/9/2024)    [employer] - Occupational Stress Questionnaire     Feeling of Stress : Not at all   Housing Stability: Low Risk  (8/9/2024)    Housing Stability Vital Sign     Unable to Pay for Housing in the Last Year: No     Homeless in the Last Year: No     Patient Care Team:  Jazmín Guerra MD as PCP - General (Family  "Medicine)  Nida Waldrop MD as Hypertension Digital Medicine Responsible Provider (Family Medicine)  Robin Montiel as Digital Medicine Health   Maryuri Andersen, Doyle as Hypertension Digital Medicine Clinician (Pharmacist)  1, [employer] Employee Plan - [facility] as Hypertension Digital Medicine Contract  Jazmín Guerra MD as Hyperlipidemia Digital Medicine Responsible Provider (Family Medicine)  Maryuri Andersen, Doyle as Hyperlipidemia Digital Medicine Clinician (Pharmacist)  Sania Flores PharmD as Pharmacist (Pharmacist)   Subjective:   Review of Systems   Constitutional:  Negative for fever and weight loss.   Cardiovascular:  Negative for chest pain.   Gastrointestinal:  Negative for abdominal pain.   Genitourinary:  Negative for dysuria and hematuria.   Musculoskeletal:  Positive for back pain.   Neurological:  Positive for tingling. Negative for weakness and headaches.     See HPI for details  All Other ROS: Negative except as stated in HPI.     Answers submitted by the patient for this visit:  Back Pain Questionnaire (Submitted on 9/23/2024)  Chief Complaint: Back pain  Chronicity: new  Onset: 1 to 4 weeks ago  Frequency: constantly  Progression since onset: gradually worsening  Pain location: thoracic spine  Pain quality: shooting, stabbing  Radiates to: does not radiate  Pain - numeric: 9/10  Pain is: worse during the night  Aggravated by: position, lying down  Stiffness is present: all day  bladder incontinence: No  bowel incontinence: No  leg pain: No  numbness: Yes  paresis: No  paresthesias: No  pelvic pain: No  perianal numbness: No  genital pain: No  Pain severity: severe  Improvement on treatment: no relief  Objective:   /79   Pulse 109   Ht 5' 5" (1.651 m)   Wt 104.8 kg (231 lb)   LMP  (LMP Unknown)   SpO2 99%   BMI 38.44 kg/m²   Physical Exam  Constitutional:       General: She is not in acute distress.  Musculoskeletal:      Comments: Spine and paraspinal muscles NTTP. " R posterior shoulder muscles TTP and stiff. Strength 5/5 and NV intact BL UE   Neurological:      Mental Status: She is alert.       Assessment:       ICD-10-CM ICD-9-CM   1. Tendinopathy of right shoulder  M67.911 727.9   2. Nausea  R11.0 787.02      Plan:   1. Tendinopathy of right shoulder  Assessment & Plan:  Toradol injection in office  Attempt Robaxin and Toradol po prn  Cont warm compress  HEP and stretches  Consider PT/XR if worsen    Orders:  -     ketorolac injection 60 mg  -     ketorolac (TORADOL) 10 mg tablet; Take 1 tablet (10 mg total) by mouth every 6 (six) hours. for 5 days  Dispense: 20 tablet; Refill: 0  -     methocarbamoL (ROBAXIN) 500 MG Tab; Take 1 tablet (500 mg total) by mouth 4 (four) times daily. for 10 days  Dispense: 40 tablet; Refill: 0    2. Nausea  -     ondansetron (ZOFRAN-ODT) 8 MG TbDL; Take 1 tablet (8 mg total) by mouth every 12 (twelve) hours as needed (nausea).  Dispense: 10 tablet; Refill: 0         Medication List with Changes/Refills   New Medications    KETOROLAC (TORADOL) 10 MG TABLET    Take 1 tablet (10 mg total) by mouth every 6 (six) hours. for 5 days       Start Date: 9/24/2024 End Date: 9/29/2024    METHOCARBAMOL (ROBAXIN) 500 MG TAB    Take 1 tablet (500 mg total) by mouth 4 (four) times daily. for 10 days       Start Date: 9/24/2024 End Date: 10/4/2024    ONDANSETRON (ZOFRAN-ODT) 8 MG TBDL    Take 1 tablet (8 mg total) by mouth every 12 (twelve) hours as needed (nausea).       Start Date: 9/24/2024 End Date: --   Current Medications    AMLODIPINE-VALSARTAN (EXFORGE) 5-160 MG PER TABLET    Take 1 tablet by mouth once daily.       Start Date: 7/11/2023 End Date: --    ATORVASTATIN (LIPITOR) 10 MG TABLET    Take 1 tablet (10 mg total) by mouth once daily.       Start Date: 5/8/2024  End Date: 5/8/2025    DICLOFENAC (VOLTAREN) 75 MG EC TABLET    Take 1 tablet (75 mg total) by mouth 2 (two) times daily.       Start Date: 9/11/2023 End Date: --    FAMOTIDINE (PEPCID) 40  MG TABLET    Take 1 tablet (40 mg total) by mouth once daily.       Start Date: 8/29/2024 End Date: 8/29/2025    FLUOXETINE 40 MG CAPSULE    Take 1 capsule (40 mg total) by mouth once daily.       Start Date: 2/29/2024 End Date: --    LISDEXAMFETAMINE (VYVANSE) 50 MG CAPSULE    Take 1 capsule (50 mg total) by mouth every morning.       Start Date: 8/9/2024  End Date: --    LISDEXAMFETAMINE (VYVANSE) 50 MG CAPSULE    Take 1 capsule (50 mg total) by mouth every morning.       Start Date: 9/9/2024  End Date: --    LISDEXAMFETAMINE (VYVANSE) 50 MG CAPSULE    Take 1 capsule (50 mg total) by mouth every morning.       Start Date: 10/9/2024 End Date: --    MUPIROCIN (BACTROBAN) 2 % OINTMENT    Apply topically 3 (three) times daily.       Start Date: 8/10/2023 End Date: --    PROPRANOLOL (INDERAL) 10 MG TABLET    Take 1 tablet (10 mg total) by mouth daily as needed.       Start Date: 7/11/2023 End Date: --    SEMAGLUTIDE, WEIGHT LOSS, (WEGOVY) 1 MG/0.5 ML PNIJ    Inject 1 pen (1 mg) into the skin every 7 days.       Start Date: 9/12/2024 End Date: --        Follow up if symptoms worsen or fail to improve. In addition to their scheduled follow up, the patient has also been instructed to follow up on as needed basis.

## 2024-10-09 DIAGNOSIS — E66.01 SEVERE OBESITY (BMI 35.0-39.9) WITH COMORBIDITY: ICD-10-CM

## 2024-10-09 RX ORDER — SEMAGLUTIDE 1 MG/.5ML
1 INJECTION, SOLUTION SUBCUTANEOUS
Qty: 2 ML | Refills: 0 | OUTPATIENT
Start: 2024-10-09

## 2024-10-10 ENCOUNTER — PATIENT MESSAGE (OUTPATIENT)
Dept: INTERNAL MEDICINE | Facility: CLINIC | Age: 54
End: 2024-10-10

## 2024-10-10 ENCOUNTER — OFFICE VISIT (OUTPATIENT)
Dept: INTERNAL MEDICINE | Facility: CLINIC | Age: 54
End: 2024-10-10
Payer: COMMERCIAL

## 2024-10-10 DIAGNOSIS — E66.9 OBESITY, UNSPECIFIED CLASS, UNSPECIFIED OBESITY TYPE, UNSPECIFIED WHETHER SERIOUS COMORBIDITY PRESENT: Primary | ICD-10-CM

## 2024-10-10 RX ORDER — SEMAGLUTIDE 2.4 MG/.75ML
2.4 INJECTION, SOLUTION SUBCUTANEOUS
Qty: 3 ML | Refills: 1 | Status: ACTIVE | OUTPATIENT
Start: 2024-10-10

## 2024-10-10 RX ORDER — SEMAGLUTIDE 1.7 MG/.75ML
1.7 INJECTION, SOLUTION SUBCUTANEOUS
Qty: 3 ML | Refills: 0 | Status: ACTIVE | OUTPATIENT
Start: 2024-10-10

## 2024-10-10 NOTE — PROGRESS NOTES
"Patient ID: Jadyn Wilder is a 54 y.o.    Black or   Other female    Subjective  Chief Complaint: patient presents for medical weight loss management.    Co-morbidities: HTN, DLD, prediabetes    HPI: Patient started Wegovy with [facility] in July 2024 and is currently managed on Wegovy 1 mg. Pt has completed 4 doses of this strength.    Tolerance to current therapy:  Denies vomiting, diarrhea, constipation, abdominal pain  Endorses nausea mainly related to food intake    Weight loss history:  Starting weight:    7/16/2024   Recent Readings    Weight (lbs) 237 lb    BMI 39.43 BMI    Current weight:    10/10/2024   Recent Readings    Weight (lbs) 222 lb    BMI 36.94 BMI    % weight loss since GLP-1 initiation: 6.3 %    Objective  Lab Results   Component Value Date     07/26/2024     09/20/2023     (L) 10/14/2022     Lab Results   Component Value Date    K 4.8 07/26/2024    K 4.6 09/20/2023    K 4.4 10/14/2022     Lab Results   Component Value Date     07/26/2024     09/20/2023     10/14/2022     Lab Results   Component Value Date    CO2 29 07/26/2024    CO2 25 09/20/2023    CO2 26 10/14/2022     Lab Results   Component Value Date    BUN 14.4 07/26/2024    BUN 12.1 09/20/2023    BUN 8.6 (L) 10/14/2022     No results found for: "GLU"  Lab Results   Component Value Date    CALCIUM 9.6 07/26/2024    CALCIUM 9.3 09/20/2023    CALCIUM 9.1 10/14/2022     No results found for: "PROT"  Lab Results   Component Value Date    ALBUMIN 3.9 07/26/2024    ALBUMIN 4.0 09/20/2023    ALBUMIN 3.8 10/14/2022     Lab Results   Component Value Date    BILITOT 0.4 07/26/2024    BILITOT 0.6 09/20/2023    BILITOT 0.6 10/14/2022     Lab Results   Component Value Date    AST 19 07/26/2024    AST 18 09/20/2023    AST 20 10/14/2022     Lab Results   Component Value Date    ALT 28 07/26/2024    ALT 19 09/20/2023    ALT 21 10/14/2022     No results found for: "ANIONGAP"  Lab Results "   Component Value Date    CREATININE 0.78 07/26/2024    CREATININE 0.70 09/20/2023    CREATININE 0.62 10/14/2022     Lab Results   Component Value Date    EGFRNORACEVR >60 07/26/2024    EGFRNORACEVR >60 09/20/2023    EGFRNORACEVR >60 10/14/2022     Assessment/Plan  - Increase to Wegovy 1.7 mg x 4 weeks once completed with Wegovy 1 mg  - Then increase to Wegovy 2.4 mg SQ weekly  - RTC in 3 months for follow-up evaluation    Patient consented to pharmacist management via collaborative practice.

## 2024-10-16 DIAGNOSIS — M54.9 BACK PAIN, UNSPECIFIED BACK LOCATION, UNSPECIFIED BACK PAIN LATERALITY, UNSPECIFIED CHRONICITY: ICD-10-CM

## 2024-10-16 RX ORDER — DICLOFENAC SODIUM 75 MG/1
75 TABLET, DELAYED RELEASE ORAL 2 TIMES DAILY
Qty: 60 TABLET | Refills: 11 | Status: SHIPPED | OUTPATIENT
Start: 2024-10-16

## 2024-11-05 NOTE — PROGRESS NOTES
Date: 11/08/2024  Patient ID: [id number]   Chief Complaint: Medication Refill    HPI:   Jadyn Wilder is a 54 y.o. female here today for Medication Refill   Patient is compliant with medications for conditions below without unwanted side effects, including but not limited to chest pain, palpitations, shortness of breath, headaches, sleep/appetite disturbances. Would like to check lipids since since she has lost weight with Wegovy through weight loss program and would like to stop/decrease medications.       Patient Active Problem List   Diagnosis    Wellness examination    Primary hypertension    Anxiety and depression    ADD (attention deficit disorder)    Spondylosis of cervical region without myelopathy or radiculopathy    Menopausal syndrome    Class 2 obesity with body mass index (BMI) of 38.0 to 38.9 in adult    Prediabetes    Mixed hyperlipidemia    Back pain    Binge eating    Supraventricular tachycardia    Leukocytosis    Gastroesophageal reflux disease    Tendinopathy of right shoulder     Outpatient Medications Marked as Taking for the 11/8/24 encounter (Office Visit) with Jazmín Guerra MD   Medication Sig Dispense Refill    amlodipine-valsartan (EXFORGE) 5-160 mg per tablet Take 1 tablet by mouth once daily. 90 tablet 3    atorvastatin (LIPITOR) 10 MG tablet Take 1 tablet (10 mg total) by mouth once daily. 90 tablet 3    diclofenac (VOLTAREN) 75 MG EC tablet Take 1 tablet (75 mg total) by mouth 2 (two) times daily. 60 tablet 11    famotidine (PEPCID) 40 MG tablet Take 1 tablet (40 mg total) by mouth once daily. 30 tablet 11    FLUoxetine 40 MG capsule Take 1 capsule (40 mg total) by mouth once daily. 910 capsule 3    mupirocin (BACTROBAN) 2 % ointment Apply topically 3 (three) times daily. 30 g 1    ondansetron (ZOFRAN-ODT) 8 MG TbDL Take 1 tablet (8 mg total) by mouth every 12 (twelve) hours as needed (nausea). 10 tablet 0    propranoloL (INDERAL) 10 MG tablet Take 1 tablet (10 mg total) by mouth  daily as needed. 30 tablet 11    semaglutide, weight loss, (WEGOVY) 2.4 mg/0.75 mL PnIj Inject 1 pen (2.4 mg)  into the skin every 7 days. 3 mL 1     Past Medical History:   Diagnosis Date    Acute cystitis without hematuria 09/11/2023    Resolved      ADD (attention deficit disorder)     Anxiety     and depression    Bilirubinuria 09/11/2023    Obtain CMP      Binge eating 09/29/2023    Closed trimalleolar fracture of right ankle with routine healing 06/22/2022    COVID 12/20/2023    Finger pain, right 06/18/2023    Hypertension     right finger 06/18/2023    Screening for malignant neoplasm of colon     Segmental and somatic dysfunction     Spondylosis of cervical region without myelopathy or radiculopathy     Vitamin D deficiency 07/11/2023    Yeast infection 07/29/2024     Past Surgical History:   Procedure Laterality Date    FRACTURE SURGERY  06/24/2022    Broken right ankle    KNEE ARTHROSCOPY      OPEN REDUCTION AND INTERNAL FIXATION (ORIF) OF INJURY OF ANKLE Right 06/23/2022    Procedure: ORIF, ANKLE-will start prone-Synthes;  Surgeon: Ezekiel Emery Jr., MD;  Location: [facility];  Service: Orthopedics;  Laterality: Right;    TUBAL LIGATION       Review of patient's allergies indicates:  No Known Allergies  Family History   Problem Relation Name Age of Onset    Diabetes Mother Vanessa Wilder     Hypertension Father Santos Wilder     Skin cancer Father Santos Wilder     No Known Problems Sister      No Known Problems Brother      Colon cancer Maternal Grandmother      Pancreatic cancer Maternal Grandfather        Social History     Socioeconomic History    Marital status: Significant Other   Occupational History    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   Tobacco Use    Smoking status: Former     Types: Cigarettes     Passive exposure: Never    Smokeless tobacco: Never    Tobacco comments:     Quit >15yrs ago   Substance and Sexual Activity    Alcohol use: Not Currently    Drug use: Never    Sexual activity: Yes     Partners: Male  "    Birth control/protection: See Surgical Hx     Comment: valerie   Social History Narrative    Has granddaughter 5mos olf     Social Drivers of Health     Financial Resource Strain: Low Risk  (8/9/2024)    Overall Financial Resource Strain (CARDIA)     Difficulty of Paying Living Expenses: Not hard at all   Food Insecurity: No Food Insecurity (8/9/2024)    Hunger Vital Sign     Worried About Running Out of Food in the Last Year: Never true     Ran Out of Food in the Last Year: Never true   Transportation Needs: No Transportation Needs (8/9/2024)    TRANSPORTATION NEEDS     Transportation : No   Physical Activity: Inactive (8/9/2024)    Exercise Vital Sign     Days of Exercise per Week: 0 days     Minutes of Exercise per Session: 0 min   Stress: No Stress Concern Present (8/9/2024)    [place] [place] [employer] - Occupational Stress Questionnaire     Feeling of Stress : Not at all   Housing Stability: Low Risk  (8/9/2024)    Housing Stability Vital Sign     Unable to Pay for Housing in the Last Year: No     Homeless in the Last Year: No     Patient Care Team:  Jazmín Guerra MD as PCP - General (Family Medicine)  Nida Waldrop MD as Hypertension Digital Medicine Responsible Provider (Family Medicine)  Robin Montiel as Digital Medicine Health   Maryuri Andersen, PharmD as Hypertension Digital Medicine Clinician (Pharmacist)  1, [employer] Plan [facility] as Hypertension Digital Medicine Contract  Jazmín Guerra MD as Hyperlipidemia Digital Medicine Responsible Provider (Family Medicine)  Maryuri Andersen, PharmD as Hyperlipidemia Digital Medicine Clinician (Pharmacist)  Sania Flores, PharmD as Pharmacist (Pharmacist)   Subjective:   ROS  See HPI for details  All Other ROS: Negative except as stated in HPI.   Objective:   /82   Pulse 87   Ht 5' 5" (1.651 m)   Wt 97.7 kg (215 lb 6.4 oz)   LMP  (LMP Unknown)   SpO2 100%   BMI 35.84 kg/m²   Physical Exam  General: " NAD  Eye: EOMI  HENT: no nasal discharge  CV: RRR  Respiratory: non-labored breathing  Musculoskeletal: ambulates independently. No obvious deformity  Integumentary: no apparent discoloration  Neurologic: Alert, oriented to person and situation  Cognition and Speech: Speech clear and coherent.   Psychiatric: Cooperative    Assessment:       ICD-10-CM ICD-9-CM   1. Attention deficit disorder, unspecified type  F98.8 314.00   2. Breast cancer screening by mammogram  Z12.31 V76.12   3. Mixed hyperlipidemia  E78.2 272.2      Plan:   1. Attention deficit disorder, unspecified type  Assessment & Plan:  Controlled  Continue Vyvanse to 50 mg daily.  AE discussed  Controlled substance agreement signed 5/2024  /narx care checked without discrepancies noted  3-month medication refills electronically sent  Quarterly visits required for continued prescriptions with minimum annual wellness exam  Discussed adverse effects that include but not limited to cardiovascular event, stroke, heart attack, death      Orders:  -     lisdexamfetamine (VYVANSE) 50 MG capsule; Take 1 capsule (50 mg total) by mouth every morning.  Dispense: 30 capsule; Refill: 0  -     lisdexamfetamine (VYVANSE) 50 MG capsule; Take 1 capsule (50 mg total) by mouth every morning.  Dispense: 30 capsule; Refill: 0  -     lisdexamfetamine (VYVANSE) 50 MG capsule; Take 1 capsule (50 mg total) by mouth every morning.  Dispense: 30 capsule; Refill: 0    2. Breast cancer screening by mammogram  -     Mammo Digital Screening Bilat w/ Abdiel; Future; Expected date: 11/08/2024    3. Mixed hyperlipidemia  Assessment & Plan:  Continue Rx: Atorvastatin 10mg qd. Consider decreasing/stopping if lipids significantly improved  Obtain lipid panel  The 10-year ASCVD risk score (Alessandro DK, et al., 2019) is: 2.2%    Values used to calculate the score:      Age: 54 years      Sex: Female      Is Non- : No      Diabetic: No      Tobacco smoker: No      Systolic  Blood Pressure: 116 mmHg      Is BP treated: Yes      HDL Cholesterol: 51 mg/dL      Total Cholesterol: 224 mg/dL   Can try omega-3 fatty acids (total,LDL,TG), niacin (TG), CoQ10 (total), Red yeast rice (LDL,TG,inc HDL)  Encourage weight loss by least 5% and to increase aerobic exercise and resistance training  Limit simple sugars and simple carbohydrate intake-focus on low glycemic foods  Optimize blood sugar control          Orders:  -     Lipid Panel; Future; Expected date: 11/08/2024         Medication List with Changes/Refills   New Medications    LISDEXAMFETAMINE (VYVANSE) 50 MG CAPSULE    Take 1 capsule (50 mg total) by mouth every morning.       Start Date: 12/8/2024 End Date: --    LISDEXAMFETAMINE (VYVANSE) 50 MG CAPSULE    Take 1 capsule (50 mg total) by mouth every morning.       Start Date: 1/8/2025  End Date: --   Current Medications    AMLODIPINE-VALSARTAN (EXFORGE) 5-160 MG PER TABLET    Take 1 tablet by mouth once daily.       Start Date: 7/11/2023 End Date: --    ATORVASTATIN (LIPITOR) 10 MG TABLET    Take 1 tablet (10 mg total) by mouth once daily.       Start Date: 5/8/2024  End Date: 5/8/2025    DICLOFENAC (VOLTAREN) 75 MG EC TABLET    Take 1 tablet (75 mg total) by mouth 2 (two) times daily.       Start Date: 10/16/2024End Date: --    FAMOTIDINE (PEPCID) 40 MG TABLET    Take 1 tablet (40 mg total) by mouth once daily.       Start Date: 8/29/2024 End Date: 8/29/2025    FLUOXETINE 40 MG CAPSULE    Take 1 capsule (40 mg total) by mouth once daily.       Start Date: 2/29/2024 End Date: --    LISDEXAMFETAMINE (VYVANSE) 50 MG CAPSULE    Take 1 capsule (50 mg total) by mouth every morning.       Start Date: 9/9/2024  End Date: --    LISDEXAMFETAMINE (VYVANSE) 50 MG CAPSULE    Take 1 capsule (50 mg total) by mouth every morning.       Start Date: 10/9/2024 End Date: --    MUPIROCIN (BACTROBAN) 2 % OINTMENT    Apply topically 3 (three) times daily.       Start Date: 8/10/2023 End Date: --    ONDANSETRON  (ZOFRAN-ODT) 8 MG TBDL    Take 1 tablet (8 mg total) by mouth every 12 (twelve) hours as needed (nausea).       Start Date: 9/24/2024 End Date: --    PROPRANOLOL (INDERAL) 10 MG TABLET    Take 1 tablet (10 mg total) by mouth daily as needed.       Start Date: 7/11/2023 End Date: --    SEMAGLUTIDE, WEIGHT LOSS, (WEGOVY) 2.4 MG/0.75 ML PNIJ    Inject 1 pen (2.4 mg)  into the skin every 7 days.       Start Date: 10/10/2024End Date: --   Changed and/or Refilled Medications    Modified Medication Previous Medication    LISDEXAMFETAMINE (VYVANSE) 50 MG CAPSULE lisdexamfetamine (VYVANSE) 50 MG capsule       Take 1 capsule (50 mg total) by mouth every morning.    Take 1 capsule (50 mg total) by mouth every morning.       Start Date: 11/8/2024 End Date: --    Start Date: 8/9/2024  End Date: 11/8/2024        Follow up in about 3 months (around 2/8/2025) for ADD/ADHD, Med Refill, HLD with labs, Telemed. In addition to their scheduled follow up, the patient has also been instructed to follow up on as needed basis.

## 2024-11-08 ENCOUNTER — OFFICE VISIT (OUTPATIENT)
Dept: PRIMARY CARE CLINIC | Facility: CLINIC | Age: 54
End: 2024-11-08
Payer: COMMERCIAL

## 2024-11-08 ENCOUNTER — TELEPHONE (OUTPATIENT)
Dept: INTERNAL MEDICINE | Facility: CLINIC | Age: 54
End: 2024-11-08
Payer: COMMERCIAL

## 2024-11-08 ENCOUNTER — PATIENT MESSAGE (OUTPATIENT)
Dept: INTERNAL MEDICINE | Facility: CLINIC | Age: 54
End: 2024-11-08
Payer: COMMERCIAL

## 2024-11-08 VITALS
HEART RATE: 87 BPM | HEIGHT: 65 IN | SYSTOLIC BLOOD PRESSURE: 116 MMHG | WEIGHT: 215.38 LBS | OXYGEN SATURATION: 100 % | BODY MASS INDEX: 35.88 KG/M2 | DIASTOLIC BLOOD PRESSURE: 82 MMHG

## 2024-11-08 DIAGNOSIS — E78.2 MIXED HYPERLIPIDEMIA: ICD-10-CM

## 2024-11-08 DIAGNOSIS — E66.9 OBESITY, UNSPECIFIED CLASS, UNSPECIFIED OBESITY TYPE, UNSPECIFIED WHETHER SERIOUS COMORBIDITY PRESENT: Primary | ICD-10-CM

## 2024-11-08 DIAGNOSIS — F98.8 ATTENTION DEFICIT DISORDER, UNSPECIFIED TYPE: Primary | ICD-10-CM

## 2024-11-08 DIAGNOSIS — Z12.31 BREAST CANCER SCREENING BY MAMMOGRAM: ICD-10-CM

## 2024-11-08 RX ORDER — LISDEXAMFETAMINE DIMESYLATE 50 MG/1
50 CAPSULE ORAL EVERY MORNING
Qty: 30 CAPSULE | Refills: 0 | Status: SHIPPED | OUTPATIENT
Start: 2025-01-08

## 2024-11-08 RX ORDER — LISDEXAMFETAMINE DIMESYLATE 50 MG/1
50 CAPSULE ORAL EVERY MORNING
Qty: 30 CAPSULE | Refills: 0 | Status: SHIPPED | OUTPATIENT
Start: 2024-12-08

## 2024-11-08 RX ORDER — LISDEXAMFETAMINE DIMESYLATE 50 MG/1
50 CAPSULE ORAL EVERY MORNING
Qty: 30 CAPSULE | Refills: 0 | Status: SHIPPED | OUTPATIENT
Start: 2024-11-08

## 2024-11-08 NOTE — ASSESSMENT & PLAN NOTE
Continue Rx: Atorvastatin 10mg qd. Consider decreasing/stopping if lipids significantly improved  Obtain lipid panel  The 10-year ASCVD risk score (Alessandro ARIZMENDI, et al., 2019) is: 2.2%    Values used to calculate the score:      Age: 54 years      Sex: Female      Is Non- : No      Diabetic: No      Tobacco smoker: No      Systolic Blood Pressure: 116 mmHg      Is BP treated: Yes      HDL Cholesterol: 51 mg/dL      Total Cholesterol: 224 mg/dL   Can try omega-3 fatty acids (total,LDL,TG), niacin (TG), CoQ10 (total), Red yeast rice (LDL,TG,inc HDL)  Encourage weight loss by least 5% and to increase aerobic exercise and resistance training  Limit simple sugars and simple carbohydrate intake-focus on low glycemic foods  Optimize blood sugar control

## 2024-11-08 NOTE — ASSESSMENT & PLAN NOTE
Controlled  Continue Vyvanse to 50 mg daily.  AE discussed  Controlled substance agreement signed 5/2024  /narx care checked without discrepancies noted  3-month medication refills electronically sent  Quarterly visits required for continued prescriptions with minimum annual wellness exam  Discussed adverse effects that include but not limited to cardiovascular event, stroke, heart attack, death

## 2024-11-12 ENCOUNTER — PATIENT MESSAGE (OUTPATIENT)
Dept: ADMINISTRATIVE | Facility: HOSPITAL | Age: 54
End: 2024-11-12
Payer: COMMERCIAL

## 2024-11-12 RX ORDER — SEMAGLUTIDE 1 MG/.5ML
1 INJECTION, SOLUTION SUBCUTANEOUS
Qty: 2 ML | Refills: 1 | Status: ACTIVE | OUTPATIENT
Start: 2024-11-12

## 2024-11-12 NOTE — TELEPHONE ENCOUNTER
Pt reported increased side effects with current dose of Wegovy (1.7 mg). Will reduce to Wegovy 1 mg and reassess at next fill to determine if titration can continue.

## 2024-11-19 ENCOUNTER — TELEPHONE (OUTPATIENT)
Dept: PRIMARY CARE CLINIC | Facility: CLINIC | Age: 54
End: 2024-11-19
Payer: COMMERCIAL

## 2024-11-19 DIAGNOSIS — F98.8 ATTENTION DEFICIT DISORDER, UNSPECIFIED TYPE: ICD-10-CM

## 2024-11-19 RX ORDER — LISDEXAMFETAMINE DIMESYLATE 50 MG/1
50 CAPSULE ORAL EVERY MORNING
Qty: 30 CAPSULE | Refills: 0 | Status: SHIPPED | OUTPATIENT
Start: 2024-11-19

## 2024-11-19 NOTE — TELEPHONE ENCOUNTER
----- Message from Nurse Ramirez sent at 11/19/2024 11:21 AM CST -----  Regarding: FW: refill    ----- Message -----  From: Rigoberto Cavazos  Sent: 11/19/2024  10:15 AM CST  To: [facility] Staff  Subject: refill                                           Who Called: Jadyn ALEXANDRA Meño    Caller is requesting assistance/information from provider's office.    Symptoms (please be specific):    How long has patient had these symptoms:      List of preferred pharmacies on file (remove unneeded): [facility] Address: [id number] , [place] [address]  Phone: [phone]    If different, enter pharmacy into here including location and phone number:       Preferred Method of Contact: Phone Call  Patient's Preferred Phone Number on File: [phone]   Best Call Back Number, if different:    Additional Information: pt is requesting for Vyvanse rx to be sent to [facility] on [address]. [facility] pharmacy it was originally sent to is out of medication. Please advise

## 2024-11-21 ENCOUNTER — PATIENT MESSAGE (OUTPATIENT)
Dept: PRIMARY CARE CLINIC | Facility: CLINIC | Age: 54
End: 2024-11-21
Payer: COMMERCIAL

## 2024-11-21 DIAGNOSIS — R11.0 NAUSEA: ICD-10-CM

## 2024-11-21 DIAGNOSIS — F32.A ANXIETY AND DEPRESSION: ICD-10-CM

## 2024-11-21 DIAGNOSIS — F41.9 ANXIETY AND DEPRESSION: ICD-10-CM

## 2024-11-21 DIAGNOSIS — I10 PRIMARY HYPERTENSION: ICD-10-CM

## 2024-11-21 DIAGNOSIS — K21.9 GASTROESOPHAGEAL REFLUX DISEASE, UNSPECIFIED WHETHER ESOPHAGITIS PRESENT: Primary | ICD-10-CM

## 2024-11-21 RX ORDER — AMLODIPINE AND VALSARTAN 5; 160 MG/1; MG/1
1 TABLET ORAL DAILY
Qty: 90 TABLET | Refills: 3 | Status: SHIPPED | OUTPATIENT
Start: 2024-11-21

## 2024-11-21 RX ORDER — PANTOPRAZOLE SODIUM 20 MG/1
20 TABLET, DELAYED RELEASE ORAL 2 TIMES DAILY
COMMUNITY
End: 2024-11-21 | Stop reason: SDUPTHER

## 2024-11-21 RX ORDER — PROPRANOLOL HYDROCHLORIDE 10 MG/1
10 TABLET ORAL DAILY PRN
Qty: 30 TABLET | Refills: 11 | Status: SHIPPED | OUTPATIENT
Start: 2024-11-21

## 2024-11-21 RX ORDER — PANTOPRAZOLE SODIUM 20 MG/1
40 TABLET, DELAYED RELEASE ORAL 2 TIMES DAILY
Qty: 60 TABLET | Refills: 3 | Status: SHIPPED | OUTPATIENT
Start: 2024-11-21

## 2024-11-21 RX ORDER — ONDANSETRON 8 MG/1
8 TABLET, ORALLY DISINTEGRATING ORAL EVERY 12 HOURS PRN
Qty: 10 TABLET | Refills: 0 | Status: SHIPPED | OUTPATIENT
Start: 2024-11-21

## 2024-12-09 DIAGNOSIS — E66.9 OBESITY, UNSPECIFIED CLASS, UNSPECIFIED OBESITY TYPE, UNSPECIFIED WHETHER SERIOUS COMORBIDITY PRESENT: ICD-10-CM

## 2024-12-09 RX ORDER — SEMAGLUTIDE 1.7 MG/.75ML
1.7 INJECTION, SOLUTION SUBCUTANEOUS
Qty: 3 ML | Refills: 0 | Status: ACTIVE | OUTPATIENT
Start: 2024-12-09

## 2024-12-12 ENCOUNTER — OFFICE VISIT (OUTPATIENT)
Dept: URGENT CARE | Facility: CLINIC | Age: 54
End: 2024-12-12
Payer: COMMERCIAL

## 2024-12-12 VITALS
DIASTOLIC BLOOD PRESSURE: 78 MMHG | RESPIRATION RATE: 20 BRPM | WEIGHT: 211 LBS | BODY MASS INDEX: 35.16 KG/M2 | SYSTOLIC BLOOD PRESSURE: 111 MMHG | HEART RATE: 87 BPM | OXYGEN SATURATION: 97 % | HEIGHT: 65 IN | TEMPERATURE: 98 F

## 2024-12-12 DIAGNOSIS — J01.90 ACUTE RHINOSINUSITIS: Primary | ICD-10-CM

## 2024-12-12 DIAGNOSIS — R05.9 COUGH, UNSPECIFIED TYPE: ICD-10-CM

## 2024-12-12 LAB
CTP QC/QA: YES
CTP QC/QA: YES
POC MOLECULAR INFLUENZA A AGN: NEGATIVE
POC MOLECULAR INFLUENZA B AGN: NEGATIVE
SARS-COV-2 AG RESP QL IA.RAPID: NEGATIVE

## 2024-12-12 RX ORDER — BETAMETHASONE SODIUM PHOSPHATE AND BETAMETHASONE ACETATE 3; 3 MG/ML; MG/ML
9 INJECTION, SUSPENSION INTRA-ARTICULAR; INTRALESIONAL; INTRAMUSCULAR; SOFT TISSUE ONCE
Status: COMPLETED | OUTPATIENT
Start: 2024-12-12 | End: 2024-12-12

## 2024-12-12 RX ADMIN — BETAMETHASONE SODIUM PHOSPHATE AND BETAMETHASONE ACETATE 9 MG: 3; 3 INJECTION, SUSPENSION INTRA-ARTICULAR; INTRALESIONAL; INTRAMUSCULAR; SOFT TISSUE at 10:12

## 2024-12-12 NOTE — PROGRESS NOTES
"Subjective:      Patient ID: Jadyn Wildre is a 54 y.o. female.    Vitals:  height is 5' 5" (1.651 m) and weight is 95.7 kg (211 lb). Her temperature is 98.3 °F (36.8 °C). Her blood pressure is 111/78 and her pulse is 87. Her respiration is 20 and oxygen saturation is 97%.     Chief Complaint: Cough (Chest congestion, sinus congestion, sore throat. - Entered by patient)     Patient is a 54 y.o. female who presents to urgent care with complaints of cough, congestion, low grade fever for 5 days. . Alleviating factors include mucinex.  , helps somewhat.  .    Cough      Respiratory:  Positive for cough.       [place]:  54-year-old female present to clinic with concerns of nasal congestion, sinus congestion, low-grade fever, postnasal drip and coughing since 5 days.  T-max at home 99.5.  Reviewed the vital signs appears stable.  Over-the-counter medication like Mucinex some help.  No concerns of positive exposure to infections.  No fever in the clinic today.  No chest pain, no shortness a breath.  For all other medical conditions follows up with primary MD.  No history of diabetes.  Currently on Wegovy for weight loss.  Requesting for cortisone injection as it helped in the past, understands the risks and benefits    ROS:  Constitutional : _ fever , Body aches, Chills  Eyes : _No redness, drainage or pain  HENT_sore throat, postnasal drainage  Respiratory_no wheezing, no shortness of breath  Cardiovascular_no chest pain  Gastrointestinal_ No nausea,No vomiting, No diarrhea, No abdominal pain  Musculoskeletal_no joint pain, no joint swelling  Integumentary_no skin rash     Objective:     Physical Exam  General : Alert and Oriented, No apparent distress, afebrile, sounds stuffy congested and nasal twang to voice  Neck - supple  HENT : Oropharynx no redness or swelling.  Bilateral TMs intact mild fluid no redness.   Respiratory : Bilateral equal breath sounds, nonlabored respirations  Cardiovascular : Rate, rhythm regular, " normal volume pulse, no murmur  Gastrointestinal: Full abdomen, soft, nontender to palpate  Integumentary : Warm, Dry and no rash    Assessment:     1. Acute rhinosinusitis    2. Cough, unspecified type      Plan:   Discussed the physical finding, condition and course.  Monitor the symptoms closely.  Adequate hydration  Coricidin HBP for cough and cold.  Claritin or Allegra for congestion  Celestone IM today as anti inflammation for symptom relief, risk and benefits discussed voiced understanding  For worsening symptoms and signs of infection call this clinic will consider antibiotics  Flu test negative, COVID-19 test negative    Acute rhinosinusitis    Cough, unspecified type  -     SARS Coronavirus 2 Antigen, POCT Manual Read  -     POCT Influenza A/B MOLECULAR

## 2024-12-12 NOTE — PATIENT INSTRUCTIONS
Discussed the physical finding, condition and course.  Monitor the symptoms closely.  Adequate hydration  Coricidin HBP for cough and cold.  Claritin or Allegra for congestion  Celestone IM today as anti inflammation for symptom relief, risk and benefits discussed voiced understanding  For worsening symptoms and signs of infection call this clinic will consider antibiotics  Flu test negative, COVID-19 test negative

## 2024-12-17 ENCOUNTER — HOSPITAL ENCOUNTER (OUTPATIENT)
Dept: RADIOLOGY | Facility: HOSPITAL | Age: 54
Discharge: HOME | End: 2024-12-17
Attending: STUDENT IN AN ORGANIZED HEALTH CARE EDUCATION/TRAINING PROGRAM
Payer: COMMERCIAL

## 2024-12-17 DIAGNOSIS — Z12.31 BREAST CANCER SCREENING BY MAMMOGRAM: ICD-10-CM

## 2024-12-17 PROCEDURE — 77067 SCR MAMMO BI INCL CAD: CPT | Mod: 26,,, | Performed by: RADIOLOGY

## 2024-12-17 PROCEDURE — 77067 SCR MAMMO BI INCL CAD: CPT | Mod: TC

## 2024-12-17 PROCEDURE — 77063 BREAST TOMOSYNTHESIS BI: CPT | Mod: 26,,, | Performed by: RADIOLOGY

## 2024-12-19 DIAGNOSIS — K21.9 GASTROESOPHAGEAL REFLUX DISEASE, UNSPECIFIED WHETHER ESOPHAGITIS PRESENT: ICD-10-CM

## 2024-12-19 RX ORDER — PANTOPRAZOLE SODIUM 20 MG/1
40 TABLET, DELAYED RELEASE ORAL 2 TIMES DAILY
Qty: 60 TABLET | Refills: 3 | Status: SHIPPED | OUTPATIENT
Start: 2024-12-19

## 2024-12-19 NOTE — PROGRESS NOTES
"Patient ID: Jadyn Wilder is a 54 y.o.    Black or   Other female    Subjective  Chief Complaint: patient presents for medical weight loss management.    Co-morbidities: HTN, DLD, prediabetes    HPI: Patient started Wegovy with [facility] in July 2024 and is currently managed on Wegovy 1.7 mg. Pt is on 1st dose back on the 1.7 mg after having to reduce the dose.    Tolerance to current therapy:  Denies nausea, vomiting, diarrhea, constipation, abdominal pain    Weight loss history:  Starting weight:    7/16/2024   Recent Readings    Weight (lbs) 237 lb    BMI 39.43 BMI    Current weight:    12/15/2024   Recent Readings    Weight (lbs) 213 lb    BMI 35.44 BMI    % weight loss since GLP-1 initiation: 10.1 %    Objective  Lab Results   Component Value Date     07/26/2024     09/20/2023     (L) 10/14/2022     Lab Results   Component Value Date    K 4.8 07/26/2024    K 4.6 09/20/2023    K 4.4 10/14/2022     Lab Results   Component Value Date     07/26/2024     09/20/2023     10/14/2022     Lab Results   Component Value Date    CO2 29 07/26/2024    CO2 25 09/20/2023    CO2 26 10/14/2022     Lab Results   Component Value Date    BUN 14.4 07/26/2024    BUN 12.1 09/20/2023    BUN 8.6 (L) 10/14/2022     No results found for: "GLU"  Lab Results   Component Value Date    CALCIUM 9.6 07/26/2024    CALCIUM 9.3 09/20/2023    CALCIUM 9.1 10/14/2022     No results found for: "PROT"  Lab Results   Component Value Date    ALBUMIN 3.9 07/26/2024    ALBUMIN 4.0 09/20/2023    ALBUMIN 3.8 10/14/2022     Lab Results   Component Value Date    BILITOT 0.4 07/26/2024    BILITOT 0.6 09/20/2023    BILITOT 0.6 10/14/2022     Lab Results   Component Value Date    AST 19 07/26/2024    AST 18 09/20/2023    AST 20 10/14/2022     Lab Results   Component Value Date    ALT 28 07/26/2024    ALT 19 09/20/2023    ALT 21 10/14/2022     No results found for: "ANIONGAP"  Lab Results "   Component Value Date    CREATININE 0.78 07/26/2024    CREATININE 0.70 09/20/2023    CREATININE 0.62 10/14/2022     Lab Results   Component Value Date    EGFRNORACEVR >60 07/26/2024    EGFRNORACEVR >60 09/20/2023    EGFRNORACEVR >60 10/14/2022     Assessment/Plan  - Continue Wegovy 1.7 mg SQ weekly  - RTC in 6 months for follow-up evaluation     Patient consented to pharmacist management via collaborative practice.

## 2024-12-20 ENCOUNTER — PATIENT MESSAGE (OUTPATIENT)
Dept: INTERNAL MEDICINE | Facility: CLINIC | Age: 54
End: 2024-12-20

## 2024-12-20 ENCOUNTER — OFFICE VISIT (OUTPATIENT)
Dept: INTERNAL MEDICINE | Facility: CLINIC | Age: 54
End: 2024-12-20
Payer: COMMERCIAL

## 2024-12-20 DIAGNOSIS — E66.812 OBESITY, CLASS II, BMI 35-39.9: Primary | ICD-10-CM

## 2024-12-20 DIAGNOSIS — F98.8 ATTENTION DEFICIT DISORDER, UNSPECIFIED TYPE: ICD-10-CM

## 2024-12-20 RX ORDER — SEMAGLUTIDE 1.7 MG/.75ML
1.7 INJECTION, SOLUTION SUBCUTANEOUS
Qty: 3 ML | Refills: 5 | Status: ACTIVE | OUTPATIENT
Start: 2024-12-20

## 2024-12-20 RX ORDER — LISDEXAMFETAMINE DIMESYLATE 50 MG/1
50 CAPSULE ORAL EVERY MORNING
Qty: 30 CAPSULE | Refills: 0 | Status: SHIPPED | OUTPATIENT
Start: 2024-12-20

## 2024-12-27 ENCOUNTER — HOSPITAL ENCOUNTER (EMERGENCY)
Facility: HOSPITAL | Age: 54
Discharge: HOME | End: 2024-12-27
Attending: EMERGENCY MEDICINE
Payer: COMMERCIAL

## 2024-12-27 VITALS
DIASTOLIC BLOOD PRESSURE: 84 MMHG | RESPIRATION RATE: 16 BRPM | TEMPERATURE: 98 F | HEART RATE: 96 BPM | WEIGHT: 210 LBS | SYSTOLIC BLOOD PRESSURE: 120 MMHG | OXYGEN SATURATION: 99 % | HEIGHT: 65 IN | BODY MASS INDEX: 34.99 KG/M2

## 2024-12-27 DIAGNOSIS — M54.50 LEFT LOW BACK PAIN, UNSPECIFIED CHRONICITY, UNSPECIFIED WHETHER SCIATICA PRESENT: Primary | ICD-10-CM

## 2024-12-27 PROCEDURE — 96372 THER/PROPH/DIAG INJ SC/IM: CPT | Performed by: NURSE PRACTITIONER

## 2024-12-27 PROCEDURE — 63600175 PHARM REV CODE 636 W HCPCS: Performed by: NURSE PRACTITIONER

## 2024-12-27 PROCEDURE — 99284 EMERGENCY DEPT VISIT MOD MDM: CPT | Mod: 25

## 2024-12-27 RX ORDER — DEXAMETHASONE SODIUM PHOSPHATE 4 MG/ML
8 INJECTION, SOLUTION INTRA-ARTICULAR; INTRALESIONAL; INTRAMUSCULAR; INTRAVENOUS; SOFT TISSUE
Status: COMPLETED | OUTPATIENT
Start: 2024-12-27 | End: 2024-12-27

## 2024-12-27 RX ORDER — KETOROLAC TROMETHAMINE 30 MG/ML
60 INJECTION, SOLUTION INTRAMUSCULAR; INTRAVENOUS
Status: COMPLETED | OUTPATIENT
Start: 2024-12-27 | End: 2024-12-27

## 2024-12-27 RX ORDER — HYDROCODONE BITARTRATE AND ACETAMINOPHEN 7.5; 325 MG/1; MG/1
1 TABLET ORAL EVERY 6 HOURS PRN
Qty: 15 TABLET | Refills: 0 | Status: SHIPPED | OUTPATIENT
Start: 2024-12-27

## 2024-12-27 RX ORDER — PREDNISONE 20 MG/1
20 TABLET ORAL DAILY
Qty: 5 TABLET | Refills: 0 | Status: SHIPPED | OUTPATIENT
Start: 2024-12-27 | End: 2025-01-01

## 2024-12-27 RX ADMIN — DEXAMETHASONE SODIUM PHOSPHATE 8 MG: 4 INJECTION, SOLUTION INTRA-ARTICULAR; INTRALESIONAL; INTRAMUSCULAR; INTRAVENOUS; SOFT TISSUE at 12:12

## 2024-12-27 RX ADMIN — KETOROLAC TROMETHAMINE 60 MG: 30 INJECTION, SOLUTION INTRAMUSCULAR at 12:12

## 2024-12-27 NOTE — ED PROVIDER NOTES
Encounter Date: 12/27/2024       History     Chief Complaint   Patient presents with    Hip Pain     Pt c/o pain to left hip onset a few days ago, denies injury.     Patient states left lower back pain that radiates into her left buttock times 2-3 days.  Patient denies any injury or trauma.  Patient denies any numbness or tingling to extremities.  Patient states that pain is intermittent and worsens with movement.  Patient denies any loss of bladder or bowel or any saddle anesthesia.  Patient states that she has intermittently had this lower back/buttock pain intermittently times months.  History of hypertension, spondylosis, ADD.    The history is provided by the patient.   Back Pain   This is a recurrent problem. The current episode started several days ago. The problem occurs intermittently. The problem has been unchanged. The pain is associated with no known injury. The pain is present in the lumbar spine. The quality of the pain is described as aching. The pain is at a severity of 5/10. The symptoms are aggravated by certain positions. The pain is The same all the time. Pertinent negatives include no chest pain, no fever, no numbness, no abdominal pain, no bowel incontinence, no perianal numbness, no bladder incontinence, no leg pain, no paresthesias, no paresis, no tingling and no weakness. She has tried nothing for the symptoms.     Review of patient's allergies indicates:  No Known Allergies  Past Medical History:   Diagnosis Date    Acute cystitis without hematuria 09/11/2023    Resolved      ADD (attention deficit disorder)     Anxiety     and depression    Bilirubinuria 09/11/2023    Obtain CMP      Binge eating 09/29/2023    Closed trimalleolar fracture of right ankle with routine healing 06/22/2022    COVID 12/20/2023    Finger pain, right 06/18/2023    Hypertension     right finger 06/18/2023    Screening for malignant neoplasm of colon     Segmental and somatic dysfunction     Spondylosis of cervical  region without myelopathy or radiculopathy     Vitamin D deficiency 07/11/2023    Yeast infection 07/29/2024     Past Surgical History:   Procedure Laterality Date    FRACTURE SURGERY  06/24/2022    Broken right ankle    KNEE ARTHROSCOPY      OPEN REDUCTION AND INTERNAL FIXATION (ORIF) OF INJURY OF ANKLE Right 06/23/2022    Procedure: ORIF, ANKLE-will start prone-Synthes;  Surgeon: Ezekiel Emery Jr., MD;  Location: [facility];  Service: Orthopedics;  Laterality: Right;    TUBAL LIGATION       Family History   Problem Relation Name Age of Onset    Diabetes Mother Vanessa Wilder     Hypertension Father Santos Wilder     Skin cancer Father Santos Wilder     No Known Problems Sister      No Known Problems Brother      Colon cancer Maternal Grandmother      Pancreatic cancer Maternal Grandfather       Social History     Tobacco Use    Smoking status: Former     Types: Cigarettes     Passive exposure: Never    Smokeless tobacco: Never    Tobacco comments:     Quit >15yrs ago   Substance Use Topics    Alcohol use: Not Currently    Drug use: Never     Review of Systems   Constitutional: Negative.  Negative for fever.   HENT: Negative.     Eyes: Negative.    Respiratory: Negative.     Cardiovascular: Negative.  Negative for chest pain.   Gastrointestinal: Negative.  Negative for abdominal pain and bowel incontinence.   Endocrine: Negative.    Genitourinary: Negative.  Negative for bladder incontinence.   Musculoskeletal:  Positive for back pain. Negative for neck pain.   Skin: Negative.  Negative for wound.   Allergic/Immunologic: Negative.    Neurological: Negative.  Negative for tingling, weakness, numbness and paresthesias.   Hematological: Negative.    Psychiatric/Behavioral: Negative.     All other systems reviewed and are negative.      Physical Exam     Initial Vitals [12/27/24 1210]   BP Pulse Resp Temp SpO2   120/84 (!) 115 20 97.9 °F (36.6 °C) 99 %      MAP       --         Physical Exam    Nursing note and vitals  reviewed.  Constitutional: She appears well-developed and well-nourished. No distress.   HENT:   Head: Normocephalic and atraumatic. Mouth/Throat: Oropharynx is clear and moist.   Eyes: Conjunctivae and EOM are normal. Pupils are equal, round, and reactive to light.   Neck: Neck supple.   Normal range of motion.  Cardiovascular:  Normal rate, regular rhythm, normal heart sounds and intact distal pulses.           Pulmonary/Chest: Breath sounds normal. No respiratory distress. She has no wheezes.   Abdominal: Abdomen is soft. Bowel sounds are normal. She exhibits no distension. There is no abdominal tenderness.   Musculoskeletal:         General: No edema. Normal range of motion.      Cervical back: Normal, normal range of motion and neck supple. No tenderness or bony tenderness. Normal range of motion.      Thoracic back: Normal. No tenderness or bony tenderness. Normal range of motion.      Lumbar back: Tenderness (Left paraspinal tenderness to palpation.) present. No bony tenderness. Normal range of motion. Negative right straight leg raise test and negative left straight leg raise test.        Back:      Neurological: She is alert and oriented to person, place, and time. She has normal strength. Gait normal. GCS score is 15. GCS eye subscore is 4. GCS verbal subscore is 5. GCS motor subscore is 6.   Skin: Skin is warm and dry. No rash noted.   Psychiatric: She has a normal mood and affect. Thought content normal.         ED Course   Procedures  Labs Reviewed - No data to display       Imaging Results              X-Ray Lumbar Spine 2 Or 3 Views (Final result)  Result time 12/27/24 13:27:35      Final result by Ibis Madrigal MD (12/27/24 13:27:35)                   Impression:      1. No acute abnormality identified.  2. Multilevel degenerative changes of the lumbar spine.      Electronically signed by: Ibis Madrigal  Date:    12/27/2024  Time:    13:27               Narrative:    EXAMINATION:  XR LUMBAR  SPINE 2 OR 3 VIEWS    CLINICAL HISTORY:  Back Pain;    COMPARISON:  X-rays dated 06/02/2022    FINDINGS:  There are 5 non-rib-bearing lumbar type vertebral bodies with a transitional type S1 vertebral body.  There is grade 1 anterolisthesis of L5 over S1.  The vertebral heights are maintained.  There is moderate disc height loss at L5-S1.  There is moderate facet arthropathy in the lower lumbar spine.  There is scattered vascular calcifications.                                       Medications   dexAMETHasone injection 8 mg (8 mg Intramuscular Given 12/27/24 1253)   ketorolac injection 60 mg (60 mg Intramuscular Given 12/27/24 1252)     Medical Decision Making  Patient states left lower back pain that radiates into her left buttock times 2-3 days.  Patient denies any injury or trauma.  Patient denies any numbness or tingling to extremities.  Patient states that pain is intermittent and worsens with movement.  Patient denies any loss of bladder or bowel or any saddle anesthesia.  Patient states that she has intermittently had this lower back/buttock pain intermittently times months.  History of hypertension, spondylosis, ADD.    The history is provided by the patient.   Back Pain   This is a recurrent problem. The current episode started several days ago. The problem occurs intermittently. The problem has been unchanged. The pain is associated with no known injury. The pain is present in the lumbar spine. The quality of the pain is described as aching. The pain is at a severity of 5/10. The symptoms are aggravated by certain positions. The pain is The same all the time. Pertinent negatives include no chest pain, no fever, no numbness, no abdominal pain, no bowel incontinence, no perianal numbness, no bladder incontinence, no leg pain, no paresthesias, no paresis, no tingling and no weakness. She has tried nothing for the symptoms.       Amount and/or Complexity of Data Reviewed  Radiology: ordered. Decision-making  details documented in ED Course.  Discussion of management or test interpretation with external provider(s): Differential diagnosis (including but not limited to):   Judging by the patient's chief complaint and pertinent history, the patient has the following possible differential diagnoses, including but not limited to the following.  Some of these are deemed to be lower likelihood and some more likely based on my physical exam and history combined with possible lab work and/or imaging studies.   Please see the pertinent studies, and refer to the HPI.  Some of these diagnoses will take further evaluation to fully rule out, perhaps as an outpatient and the patient was encouraged to follow up when discharged for more comprehensive evaluation.  Back pain, sciatica, lumbar strain, muscle pain  Patient's x-ray of her lumbar spine she does not show any acute change and shows chronic degenerative changes.  Patient was given IM Toradol for pain in the ED. discussed with patient the results of her lumbar x-ray.  We will discharge with pain control and instructed patient to follow up with her PCP she may need physical therapy and/or an outpatient MRI in the future.  Patient was given ED return precautions.      Risk  Prescription drug management.                                      Clinical Impression:  Final diagnoses:  [M54.50] Left low back pain, unspecified chronicity, unspecified whether sciatica present (Primary)          ED Disposition Condition    Discharge Stable          ED Prescriptions       Medication Sig Dispense Start Date End Date Auth. Provider    HYDROcodone-acetaminophen (NORCO) 7.5-325 mg per tablet Take 1 tablet by mouth every 6 (six) hours as needed for Pain. 15 tablet 12/27/2024 -- Evonne Hyde FNP    predniSONE (DELTASONE) 20 MG tablet Take 1 tablet (20 mg total) by mouth once daily. for 5 days 5 tablet 12/27/2024 1/1/2025 Evonne Hyde FNP          Follow-up Information       Follow up With  Specialties Details Why Contact Info    Jazmín Guerra MD Family Medicine In 3 days  [address]  [address] [id number] [phone]               Evonne Hyde, P  12/27/24 [id number]

## 2025-01-22 DIAGNOSIS — F98.8 ATTENTION DEFICIT DISORDER, UNSPECIFIED TYPE: ICD-10-CM

## 2025-01-23 ENCOUNTER — PATIENT MESSAGE (OUTPATIENT)
Dept: ADMINISTRATIVE | Facility: OTHER | Age: 55
End: 2025-01-23
Payer: COMMERCIAL

## 2025-01-23 RX ORDER — LISDEXAMFETAMINE DIMESYLATE 50 MG/1
50 CAPSULE ORAL EVERY MORNING
Qty: 30 CAPSULE | Refills: 0 | Status: SHIPPED | OUTPATIENT
Start: 2025-01-23

## 2025-01-24 ENCOUNTER — TELEPHONE (OUTPATIENT)
Dept: ORTHOPEDICS | Facility: CLINIC | Age: 55
End: 2025-01-24
Payer: COMMERCIAL

## 2025-01-24 NOTE — TELEPHONE ENCOUNTER
Attempted to call patient to r/s her appointment. Patient did not answer. Left a brief message requesting a call back.

## 2025-01-27 NOTE — TELEPHONE ENCOUNTER
I called and spoke with patient to schedule an appointment due to her previous appointment that was cancelled due to the weather    Patient confirmed new time and date

## 2025-01-30 ENCOUNTER — HOSPITAL ENCOUNTER (OUTPATIENT)
Dept: RADIOLOGY | Facility: CLINIC | Age: 55
Discharge: HOME | End: 2025-01-30
Attending: PHYSICIAN ASSISTANT
Payer: COMMERCIAL

## 2025-01-30 ENCOUNTER — OFFICE VISIT (OUTPATIENT)
Dept: ORTHOPEDICS | Facility: CLINIC | Age: 55
End: 2025-01-30
Payer: COMMERCIAL

## 2025-01-30 VITALS
BODY MASS INDEX: 35.01 KG/M2 | HEIGHT: 65 IN | DIASTOLIC BLOOD PRESSURE: 77 MMHG | SYSTOLIC BLOOD PRESSURE: 110 MMHG | WEIGHT: 210.13 LBS

## 2025-01-30 DIAGNOSIS — M51.360 DEGENERATION OF INTERVERTEBRAL DISC OF LUMBAR REGION WITH DISCOGENIC BACK PAIN: ICD-10-CM

## 2025-01-30 DIAGNOSIS — M25.552 LEFT HIP PAIN: ICD-10-CM

## 2025-01-30 DIAGNOSIS — G57.02 PIRIFORMIS SYNDROME OF LEFT SIDE: ICD-10-CM

## 2025-01-30 DIAGNOSIS — M25.552 LEFT HIP PAIN: Primary | ICD-10-CM

## 2025-01-30 PROCEDURE — 3078F DIAST BP <80 MM HG: CPT | Mod: CPTII,,, | Performed by: PHYSICIAN ASSISTANT

## 2025-01-30 PROCEDURE — 1159F MED LIST DOCD IN RCRD: CPT | Mod: CPTII,,, | Performed by: PHYSICIAN ASSISTANT

## 2025-01-30 PROCEDURE — 3074F SYST BP LT 130 MM HG: CPT | Mod: CPTII,,, | Performed by: PHYSICIAN ASSISTANT

## 2025-01-30 PROCEDURE — 1160F RVW MEDS BY RX/DR IN RCRD: CPT | Mod: CPTII,,, | Performed by: PHYSICIAN ASSISTANT

## 2025-01-30 PROCEDURE — 73502 X-RAY EXAM HIP UNI 2-3 VIEWS: CPT | Mod: LT,,, | Performed by: PHYSICIAN ASSISTANT

## 2025-01-30 PROCEDURE — 3008F BODY MASS INDEX DOCD: CPT | Mod: CPTII,,, | Performed by: PHYSICIAN ASSISTANT

## 2025-01-30 PROCEDURE — 99213 OFFICE O/P EST LOW 20 MIN: CPT | Mod: ,,, | Performed by: PHYSICIAN ASSISTANT

## 2025-01-30 NOTE — PROGRESS NOTES
Chief Complaint:   Chief Complaint   Patient presents with    Hip Pain     L hip - ongoing for a while. Hx of a fall in the bath tub. States it happened a couple months ago. Did some stretches with not much relief. Reports pain mostly on the left buttocks. Reports pain is worse in the morning.        History of present illness:    This is a 55 y.o. year old female who complains of left lateral hip and buttock pain.    Patient states that she has been having some pain for a couple of months she did not remember having a fall in the shower landing on her butt but she did not have any sudden intense onset of pain at that time.    She has been having some generalized aches and she will use his Voltaren which does give her some relief and she states that the pain is worse in the morning when she gets up but after she gets up and moves around it does not improve.    Also she states when she sits for long period of time that her buttock give him some discomfort as well      Current Outpatient Medications   Medication Sig    amlodipine-valsartan (EXFORGE) 5-160 mg per tablet Take 1 tablet by mouth once daily.    atorvastatin (LIPITOR) 10 MG tablet Take 1 tablet (10 mg total) by mouth once daily.    diclofenac (VOLTAREN) 75 MG EC tablet Take 1 tablet (75 mg total) by mouth 2 (two) times daily.    famotidine (PEPCID) 40 MG tablet Take 1 tablet (40 mg total) by mouth once daily.    FLUoxetine 40 MG capsule Take 1 capsule (40 mg total) by mouth once daily.    lisdexamfetamine (VYVANSE) 50 MG capsule Take 1 capsule (50 mg total) by mouth every morning.    ondansetron (ZOFRAN-ODT) 8 MG TbDL Take 1 tablet (8 mg total) by mouth every 12 (twelve) hours as needed (nausea).    pantoprazole (PROTONIX) 20 MG tablet Take 2 tablets (40 mg total) by mouth 2 (two) times a day.    propranoloL (INDERAL) 10 MG tablet Take 1 tablet (10 mg total) by mouth daily as needed.    semaglutide, weight loss, (WEGOVY) 1.7 mg/0.75 mL PnIj Inject 1 pen  "(1.7 mg) into the skin every 7 days.    HYDROcodone-acetaminophen (NORCO) 7.5-325 mg per tablet Take 1 tablet by mouth every 6 (six) hours as needed for Pain.    mupirocin (BACTROBAN) 2 % ointment Apply topically 3 (three) times daily. (Patient not taking: Reported on 1/30/2025)     No current facility-administered medications for this visit.       Review of Systems:    Constitution:   Denies chills, fever, and sweats.  HENT:   Denies headaches or blurry vision.  Cardiovascular:  Denies chest pain or irregular heart beat.  Respiratory:   Denies cough or shortness of breath.  Gastrointestinal:  Denies abdominal pain, nausea, or vomiting.  Musculoskeletal:   Denies muscle cramps.  Neurological:   Denies dizziness or focal weakness.  Psychiatric/Behavior: Normal mental status.  Hematology/Lymph:  Denies bleeding problem or easy bruising/bleeding.  Skin:    Denies rash or suspicious lesions.    Examination:    Vital Signs:    Vitals:    01/30/25 1500   BP: 110/77   Weight: 95.3 kg (210 lb 1.6 oz)   Height: 5' 5" (1.651 m)       Body mass index is 34.96 kg/m².    Constitution:   Well-developed, well nourished patient in no acute distress.  Neurological:   Alert and oriented x 3 and cooperative to examination.     Psychiatric/Behavior: Normal mental status.  Respiratory:   No shortness of breath.  Eyes:    Extraoccular muscles intact  Skin:    No scars, rash or suspicious lesions.    Physical Exam:   Hip Exam:  Left  No obvious deformity.   Flexion to 120 degrees and internal and external rotation to 40 degrees.   Abduction to 45 degree and adduction to 30 degrees.   Negative PEREZ test.   Negative Stinchfield test.   No flexion contracture.   Negative  greater trochanteric tenderness.   Negative DAVION test.   5/5 strength, normal skin appearance and palpable pulses distally. Sensibility normal.  The patient does have some tenderness to palpation of the sciatic notch over the piriformis area.    She has a mildly positive " piriformis test in the left    Imaging: X-rays ordered and images interpreted today personally by me of left hip three views   Patient has well-maintained joint space of the left hip pain  There is no acute osseous abnormalities noted        Assessment: Left hip pain  -     X-Ray Hip 2 or 3 views Left with Pelvis when performed; Future; Expected date: 01/30/2025    Degeneration of intervertebral disc of lumbar region with discogenic back pain  -     Ambulatory Referral/Consult to Physical Therapy/Occupational Therapy; Future; Expected date: 02/06/2025    Piriformis syndrome of left side  -     Ambulatory Referral/Consult to Physical Therapy/Occupational Therapy; Future; Expected date: 02/06/2025         Plan:  At this point the patient will continue with the diclofenac and we will start him in formal physical therapy regimen.    We will see her back in 4-6 weeks and monitor her progress with the time.    If her symptoms do not improve with the therapy we will get formal back x-rays          DISCLAIMER: This note may have been dictated using voice recognition software and may contain grammatical errors.     NOTE: Consult report sent to referring provider via [employer].

## 2025-02-06 ENCOUNTER — PATIENT MESSAGE (OUTPATIENT)
Dept: ADMINISTRATIVE | Facility: OTHER | Age: 55
End: 2025-02-06
Payer: COMMERCIAL

## 2025-02-10 ENCOUNTER — LAB VISIT (OUTPATIENT)
Dept: LAB | Facility: HOSPITAL | Age: 55
End: 2025-02-10
Attending: STUDENT IN AN ORGANIZED HEALTH CARE EDUCATION/TRAINING PROGRAM
Payer: COMMERCIAL

## 2025-02-10 DIAGNOSIS — E78.2 MIXED HYPERLIPIDEMIA: ICD-10-CM

## 2025-02-10 LAB
CHOLEST SERPL-MCNC: 172 MG/DL
CHOLEST/HDLC SERPL: 3 {RATIO} (ref 0–5)
HDLC SERPL-MCNC: 54 MG/DL (ref 35–60)
LDLC SERPL CALC-MCNC: 76 MG/DL (ref 50–140)
TRIGL SERPL-MCNC: 209 MG/DL (ref 37–140)
VLDLC SERPL CALC-MCNC: 42 MG/DL

## 2025-02-10 PROCEDURE — 36415 COLL VENOUS BLD VENIPUNCTURE: CPT

## 2025-02-10 PROCEDURE — 80061 LIPID PANEL: CPT

## 2025-02-11 ENCOUNTER — OFFICE VISIT (OUTPATIENT)
Dept: PRIMARY CARE CLINIC | Facility: CLINIC | Age: 55
End: 2025-02-11
Payer: COMMERCIAL

## 2025-02-11 DIAGNOSIS — F98.8 ATTENTION DEFICIT DISORDER, UNSPECIFIED TYPE: Primary | ICD-10-CM

## 2025-02-11 DIAGNOSIS — I47.10 SUPRAVENTRICULAR TACHYCARDIA: ICD-10-CM

## 2025-02-11 PROCEDURE — 98006 SYNCH AUDIO-VIDEO EST MOD 30: CPT | Mod: 95,,, | Performed by: NURSE PRACTITIONER

## 2025-02-11 PROCEDURE — 1159F MED LIST DOCD IN RCRD: CPT | Mod: CPTII,95,, | Performed by: NURSE PRACTITIONER

## 2025-02-11 PROCEDURE — 1160F RVW MEDS BY RX/DR IN RCRD: CPT | Mod: CPTII,95,, | Performed by: NURSE PRACTITIONER

## 2025-02-11 RX ORDER — LISDEXAMFETAMINE DIMESYLATE 50 MG/1
50 CAPSULE ORAL EVERY MORNING
Qty: 30 CAPSULE | Refills: 0 | Status: SHIPPED | OUTPATIENT
Start: 2025-02-23

## 2025-02-11 NOTE — PROGRESS NOTES
Internal Medicine      Patient ID: [id number]     Chief Complaint: Follow Up      HPI:     This is a telemedicine note. Patient was treated using telemedicine, real time audio and video, according to [facility] protocols. I, Raffi ARMANDO, conducted the visit from the [facility]. The patient participated in the visit at a non-[facility] location selected by the patient, identified below. I am licensed in the state where the patient stated they are located. The patient stated that they understood and accepted the privacy and security risks to their information at their location. This visit is not recorded.    Patient was located at the patient's home.      274}    Jadyn Wilder is a 55 y.o. female here today for a telemedicine visit. Tolerating stimulant well without adverse effects particularly denies any issues with insomnia, decreased appetite, palpitations, mood changes, or weight loss. Feels dose is controlling ADD/ADHD symptoms rather well. Potential for AE discussed particularly cardiac given her history of SVT.    Past Medical History:   Diagnosis Date    Acute cystitis without hematuria 09/11/2023    Resolved      ADD (attention deficit disorder)     Anxiety     and depression    Bilirubinuria 09/11/2023    Obtain CMP      Binge eating 09/29/2023    Closed trimalleolar fracture of right ankle with routine healing 06/22/2022    COVID 12/20/2023    Finger pain, right 06/18/2023    Hypertension     right finger 06/18/2023    Screening for malignant neoplasm of colon     Segmental and somatic dysfunction     Spondylosis of cervical region without myelopathy or radiculopathy     Vitamin D deficiency 07/11/2023    Yeast infection 07/29/2024        Past Surgical History:   Procedure Laterality Date    FRACTURE SURGERY  06/24/2022    Broken right ankle    KNEE ARTHROSCOPY      OPEN REDUCTION AND INTERNAL FIXATION (ORIF) OF INJURY OF ANKLE Right 06/23/2022    Procedure: ORIF, ANKLE-will start  prone-Synthes;  Surgeon: Ezekiel Emery Jr., MD;  Location: [facility];  Service: Orthopedics;  Laterality: Right;    TUBAL LIGATION          Social History     Tobacco Use    Smoking status: Former     Types: Cigarettes     Passive exposure: Never    Smokeless tobacco: Never    Tobacco comments:     Quit >15yrs ago   Substance and Sexual Activity    Alcohol use: Not Currently    Drug use: Never    Sexual activity: Yes     Partners: Male     Birth control/protection: See Surgical Hx     Comment: fiance        Current Outpatient Medications   Medication Instructions    amlodipine-valsartan (EXFORGE) 5-160 mg per tablet 1 tablet, Oral, Daily    atorvastatin (LIPITOR) 10 mg, Oral, Daily    diclofenac (VOLTAREN) 75 mg, Oral, 2 times daily    famotidine (PEPCID) 40 mg, Oral, Daily    FLUoxetine 40 mg, Oral, Daily    [START ON 2/23/2025] lisdexamfetamine (VYVANSE) 50 mg, Oral, Every morning    mupirocin (BACTROBAN) 2 % ointment Topical (Top), 3 times daily    ondansetron (ZOFRAN-ODT) 8 mg, Oral, Every 12 hours PRN    pantoprazole (PROTONIX) 40 mg, Oral, 2 times daily    propranoloL (INDERAL) 10 mg, Oral, Daily PRN    semaglutide, weight loss, (WEGOVY) 1.7 mg/0.75 mL PnIj Inject 1 pen (1.7 mg) into the skin every 7 days.       Review of patient's allergies indicates:  No Known Allergies     Patient Care Team:  Jazmín Guerra MD as PCP - General (Family Medicine)  Nida Waldrop MD as Hypertension Digital Medicine Responsible Provider (Family Medicine)  Maryuri Andersen, PharmD as Hypertension Digital Medicine Clinician (Pharmacist)  1, [employer] [facility] as Hypertension Digital Medicine Contract  Jazmín Guerra MD as Hyperlipidemia Digital Medicine Responsible Provider (Family Medicine)  Maryuri Andersen, PharmD as Hyperlipidemia Digital Medicine Clinician (Pharmacist)  Talat Paez, PharmD as Pharmacist     Subjective:     Review of Systems    12 point review of systems conducted, negative except as  stated in the history of present illness. See HPI for details.    Objective:     Visit Vitals  LMP  (LMP Unknown)       Physical Exam    Physical Exam: LIMITED DUE TO TELEMEDICINE RESTRICTIONS.  General: Alert and oriented, No acute distress.  Head: Normocephalic.  Eye: Sclera non-icteric.  Neck/Thyroid:  Full range of motion.  Respiratory: Non-labored respirations, Symmetrical chest wall expansion.  Musculoskeletal: Normal range of motion.  Integumentary:  No visible suspicious lesions or rashes. No diaphoresis.   Neurologic: No focal deficits  Psychiatric: Normal interaction, Coherent speech, Euthymic mood, Appropriate affect     Assessment:       ICD-10-CM ICD-9-CM   1. Attention deficit disorder, unspecified type  F98.8 314.00   2. Supraventricular tachycardia  I47.10 427.89        Plan:     1. Attention deficit disorder, unspecified type  Assessment & Plan:  Controlled  Continue Vyvanse to 50 mg daily.  AE discussed  Controlled substance agreement signed 5/2024  /narx care checked without discrepancies noted  3-month medication refills electronically sent  Quarterly visits required for continued prescriptions with minimum annual wellness exam  Discussed adverse effects that include but not limited to cardiovascular event, stroke, heart attack, death      Orders:  -     lisdexamfetamine (VYVANSE) 50 MG capsule; Take 1 capsule (50 mg total) by mouth every morning.  Dispense: 30 capsule; Refill: 0    2. Supraventricular tachycardia  Assessment & Plan:  Stable. Discussed risks associated with stimulant use  Continue Propranolol 10mg qd prn           Follow up in about 3 months (around 5/11/2025) for ADD Follow Up. In addition to their scheduled follow up, the patient has also been instructed to follow up on as needed basis.     Future Appointments   Date Time Provider Department Center   3/13/2025  3:00 PM Ok Lainez PA-C [facility]        Video Time Documentation:  Spent 10 minutes with  patient face to face discussed health concerns. More than 50% of this time was spent in counseling and coordination of care.    IRENE Tam

## 2025-02-25 DIAGNOSIS — F98.8 ATTENTION DEFICIT DISORDER, UNSPECIFIED TYPE: ICD-10-CM

## 2025-02-25 RX ORDER — LISDEXAMFETAMINE DIMESYLATE 50 MG/1
50 CAPSULE ORAL EVERY MORNING
Qty: 30 CAPSULE | Refills: 0 | Status: SHIPPED | OUTPATIENT
Start: 2025-02-25

## 2025-03-06 ENCOUNTER — PATIENT MESSAGE (OUTPATIENT)
Dept: ADMINISTRATIVE | Facility: OTHER | Age: 55
End: 2025-03-06
Payer: COMMERCIAL

## 2025-03-18 DIAGNOSIS — F41.9 ANXIETY AND DEPRESSION: ICD-10-CM

## 2025-03-18 DIAGNOSIS — K21.9 GASTROESOPHAGEAL REFLUX DISEASE, UNSPECIFIED WHETHER ESOPHAGITIS PRESENT: ICD-10-CM

## 2025-03-18 DIAGNOSIS — F32.A ANXIETY AND DEPRESSION: ICD-10-CM

## 2025-03-18 DIAGNOSIS — R11.0 NAUSEA: ICD-10-CM

## 2025-03-19 RX ORDER — ONDANSETRON 8 MG/1
8 TABLET, ORALLY DISINTEGRATING ORAL EVERY 12 HOURS PRN
Qty: 10 TABLET | Refills: 0 | Status: SHIPPED | OUTPATIENT
Start: 2025-03-19

## 2025-03-19 RX ORDER — PANTOPRAZOLE SODIUM 20 MG/1
40 TABLET, DELAYED RELEASE ORAL 2 TIMES DAILY
Qty: 60 TABLET | Refills: 3 | Status: SHIPPED | OUTPATIENT
Start: 2025-03-19

## 2025-03-19 RX ORDER — FLUOXETINE HYDROCHLORIDE 40 MG/1
40 CAPSULE ORAL DAILY
Qty: 910 CAPSULE | Refills: 3 | Status: SHIPPED | OUTPATIENT
Start: 2025-03-19

## 2025-04-01 DIAGNOSIS — F98.8 ATTENTION DEFICIT DISORDER, UNSPECIFIED TYPE: ICD-10-CM

## 2025-04-01 RX ORDER — LISDEXAMFETAMINE DIMESYLATE 50 MG/1
50 CAPSULE ORAL EVERY MORNING
Qty: 30 CAPSULE | Refills: 0 | Status: SHIPPED | OUTPATIENT
Start: 2025-04-01

## 2025-04-03 ENCOUNTER — PATIENT MESSAGE (OUTPATIENT)
Dept: ADMINISTRATIVE | Facility: OTHER | Age: 55
End: 2025-04-03
Payer: COMMERCIAL

## 2025-04-27 ENCOUNTER — PATIENT MESSAGE (OUTPATIENT)
Dept: ADMINISTRATIVE | Facility: OTHER | Age: 55
End: 2025-04-27
Payer: COMMERCIAL

## 2025-04-28 ENCOUNTER — PATIENT MESSAGE (OUTPATIENT)
Dept: PRIMARY CARE CLINIC | Facility: CLINIC | Age: 55
End: 2025-04-28
Payer: COMMERCIAL

## 2025-04-28 DIAGNOSIS — R73.03 PREDIABETES: ICD-10-CM

## 2025-04-28 DIAGNOSIS — N95.1 MENOPAUSAL SYNDROME: ICD-10-CM

## 2025-04-28 DIAGNOSIS — E78.2 MIXED HYPERLIPIDEMIA: ICD-10-CM

## 2025-04-28 DIAGNOSIS — I10 PRIMARY HYPERTENSION: Primary | ICD-10-CM

## 2025-04-28 DIAGNOSIS — I47.10 SUPRAVENTRICULAR TACHYCARDIA: ICD-10-CM

## 2025-04-28 DIAGNOSIS — F41.9 ANXIETY AND DEPRESSION: ICD-10-CM

## 2025-04-28 DIAGNOSIS — D72.829 LEUKOCYTOSIS, UNSPECIFIED TYPE: ICD-10-CM

## 2025-04-28 DIAGNOSIS — F32.A ANXIETY AND DEPRESSION: ICD-10-CM

## 2025-04-28 DIAGNOSIS — E66.812 CLASS 2 OBESITY WITH BODY MASS INDEX (BMI) OF 38.0 TO 38.9 IN ADULT, UNSPECIFIED OBESITY TYPE, UNSPECIFIED WHETHER SERIOUS COMORBIDITY PRESENT: ICD-10-CM

## 2025-04-30 DIAGNOSIS — F98.8 ATTENTION DEFICIT DISORDER, UNSPECIFIED TYPE: ICD-10-CM

## 2025-04-30 RX ORDER — LISDEXAMFETAMINE DIMESYLATE 50 MG/1
50 CAPSULE ORAL EVERY MORNING
Qty: 30 CAPSULE | Refills: 0 | Status: SHIPPED | OUTPATIENT
Start: 2025-04-30

## 2025-05-01 ENCOUNTER — LAB VISIT (OUTPATIENT)
Dept: LAB | Facility: HOSPITAL | Age: 55
End: 2025-05-01
Attending: STUDENT IN AN ORGANIZED HEALTH CARE EDUCATION/TRAINING PROGRAM
Payer: COMMERCIAL

## 2025-05-01 DIAGNOSIS — R73.03 PREDIABETES: ICD-10-CM

## 2025-05-01 DIAGNOSIS — D72.829 LEUKOCYTOSIS, UNSPECIFIED TYPE: ICD-10-CM

## 2025-05-01 DIAGNOSIS — E66.812 CLASS 2 OBESITY WITH BODY MASS INDEX (BMI) OF 38.0 TO 38.9 IN ADULT, UNSPECIFIED OBESITY TYPE, UNSPECIFIED WHETHER SERIOUS COMORBIDITY PRESENT: ICD-10-CM

## 2025-05-01 DIAGNOSIS — N95.1 MENOPAUSAL SYNDROME: ICD-10-CM

## 2025-05-01 DIAGNOSIS — I10 PRIMARY HYPERTENSION: ICD-10-CM

## 2025-05-01 DIAGNOSIS — E78.2 MIXED HYPERLIPIDEMIA: ICD-10-CM

## 2025-05-01 LAB
ALBUMIN SERPL-MCNC: 4.2 G/DL (ref 3.5–5)
ALBUMIN/GLOB SERPL: 1.1 RATIO (ref 1.1–2)
ALP SERPL-CCNC: 86 UNIT/L (ref 40–150)
ALT SERPL-CCNC: 21 UNIT/L (ref 0–55)
ANION GAP SERPL CALC-SCNC: 9 MEQ/L
AST SERPL-CCNC: 21 UNIT/L (ref 11–45)
BASOPHILS # BLD AUTO: 0.05 X10(3)/MCL
BASOPHILS NFR BLD AUTO: 0.6 %
BILIRUB SERPL-MCNC: 0.7 MG/DL
BUN SERPL-MCNC: 12.8 MG/DL (ref 9.8–20.1)
CALCIUM SERPL-MCNC: 9.8 MG/DL (ref 8.4–10.2)
CHLORIDE SERPL-SCNC: 103 MMOL/L (ref 98–107)
CHOLEST SERPL-MCNC: 177 MG/DL
CHOLEST/HDLC SERPL: 3 {RATIO} (ref 0–5)
CO2 SERPL-SCNC: 28 MMOL/L (ref 22–29)
CREAT SERPL-MCNC: 0.71 MG/DL (ref 0.55–1.02)
CREAT/UREA NIT SERPL: 18
EOSINOPHIL # BLD AUTO: 0.17 X10(3)/MCL (ref 0–0.9)
EOSINOPHIL NFR BLD AUTO: 1.9 %
ERYTHROCYTE [DISTWIDTH] IN BLOOD BY AUTOMATED COUNT: 14.3 % (ref 11.5–17)
EST. AVERAGE GLUCOSE BLD GHB EST-MCNC: 108.3 MG/DL
ESTRADIOL SERPL HS-MCNC: 29 PG/ML
GFR SERPLBLD CREATININE-BSD FMLA CKD-EPI: >60 ML/MIN/1.73/M2
GLOBULIN SER-MCNC: 3.7 GM/DL (ref 2.4–3.5)
GLUCOSE SERPL-MCNC: 89 MG/DL (ref 74–100)
HBA1C MFR BLD: 5.4 %
HCT VFR BLD AUTO: 43.1 % (ref 37–47)
HCV AB SERPL QL IA: NONREACTIVE
HDLC SERPL-MCNC: 59 MG/DL (ref 35–60)
HGB BLD-MCNC: 13.6 G/DL (ref 12–16)
HIV 1+2 AB+HIV1 P24 AG SERPL QL IA: NONREACTIVE
IMM GRANULOCYTES # BLD AUTO: 0.02 X10(3)/MCL (ref 0–0.04)
IMM GRANULOCYTES NFR BLD AUTO: 0.2 %
LDLC SERPL CALC-MCNC: 88 MG/DL (ref 50–140)
LH SERPL-ACNC: 32.3 MIU/ML
LYMPHOCYTES # BLD AUTO: 3.09 X10(3)/MCL (ref 0.6–4.6)
LYMPHOCYTES NFR BLD AUTO: 35.2 %
MCH RBC QN AUTO: 28.8 PG (ref 27–31)
MCHC RBC AUTO-ENTMCNC: 31.6 G/DL (ref 33–36)
MCV RBC AUTO: 91.1 FL (ref 80–94)
MONOCYTES # BLD AUTO: 0.81 X10(3)/MCL (ref 0.1–1.3)
MONOCYTES NFR BLD AUTO: 9.2 %
NEUTROPHILS # BLD AUTO: 4.63 X10(3)/MCL (ref 2.1–9.2)
NEUTROPHILS NFR BLD AUTO: 52.9 %
NRBC BLD AUTO-RTO: 0 %
PLATELET # BLD AUTO: 283 X10(3)/MCL (ref 130–400)
PMV BLD AUTO: 11.6 FL (ref 7.4–10.4)
POTASSIUM SERPL-SCNC: 4.5 MMOL/L (ref 3.5–5.1)
PROGEST SERPL-MCNC: <0.5 NG/ML
PROT SERPL-MCNC: 7.9 GM/DL (ref 6.4–8.3)
RBC # BLD AUTO: 4.73 X10(6)/MCL (ref 4.2–5.4)
SODIUM SERPL-SCNC: 140 MMOL/L (ref 136–145)
T4 FREE SERPL-MCNC: 1.06 NG/DL (ref 0.7–1.48)
TRIGL SERPL-MCNC: 150 MG/DL (ref 37–140)
TSH SERPL-ACNC: 1.01 UIU/ML (ref 0.35–4.94)
VLDLC SERPL CALC-MCNC: 30 MG/DL
WBC # BLD AUTO: 8.77 X10(3)/MCL (ref 4.5–11.5)

## 2025-05-01 PROCEDURE — 36415 COLL VENOUS BLD VENIPUNCTURE: CPT

## 2025-05-01 PROCEDURE — 85025 COMPLETE CBC W/AUTO DIFF WBC: CPT

## 2025-05-01 PROCEDURE — 84443 ASSAY THYROID STIM HORMONE: CPT

## 2025-05-01 PROCEDURE — 80053 COMPREHEN METABOLIC PANEL: CPT

## 2025-05-01 PROCEDURE — 83036 HEMOGLOBIN GLYCOSYLATED A1C: CPT

## 2025-05-01 PROCEDURE — 84144 ASSAY OF PROGESTERONE: CPT

## 2025-05-01 PROCEDURE — 83001 ASSAY OF GONADOTROPIN (FSH): CPT

## 2025-05-01 PROCEDURE — 80061 LIPID PANEL: CPT

## 2025-05-01 PROCEDURE — 87389 HIV-1 AG W/HIV-1&-2 AB AG IA: CPT

## 2025-05-01 PROCEDURE — 83002 ASSAY OF GONADOTROPIN (LH): CPT

## 2025-05-01 PROCEDURE — 86803 HEPATITIS C AB TEST: CPT

## 2025-05-01 PROCEDURE — 82670 ASSAY OF TOTAL ESTRADIOL: CPT

## 2025-05-01 PROCEDURE — 84439 ASSAY OF FREE THYROXINE: CPT

## 2025-05-02 LAB — FSH SERPL-ACNC: 121 IU/L

## 2025-05-05 ENCOUNTER — RESULTS FOLLOW-UP (OUTPATIENT)
Dept: PRIMARY CARE CLINIC | Facility: CLINIC | Age: 55
End: 2025-05-05

## 2025-05-18 ENCOUNTER — RESULTS FOLLOW-UP (OUTPATIENT)
Dept: URGENT CARE | Facility: CLINIC | Age: 55
End: 2025-05-18

## 2025-05-18 ENCOUNTER — OFFICE VISIT (OUTPATIENT)
Dept: URGENT CARE | Facility: CLINIC | Age: 55
End: 2025-05-18
Payer: COMMERCIAL

## 2025-05-18 VITALS
TEMPERATURE: 98 F | WEIGHT: 193 LBS | HEIGHT: 65 IN | BODY MASS INDEX: 32.15 KG/M2 | RESPIRATION RATE: 18 BRPM | DIASTOLIC BLOOD PRESSURE: 80 MMHG | HEART RATE: 81 BPM | OXYGEN SATURATION: 98 % | SYSTOLIC BLOOD PRESSURE: 113 MMHG

## 2025-05-18 DIAGNOSIS — M25.571 ACUTE RIGHT ANKLE PAIN: Primary | ICD-10-CM

## 2025-05-18 DIAGNOSIS — M19.071 ARTHRITIS OF RIGHT ANKLE: ICD-10-CM

## 2025-05-18 PROCEDURE — 99213 OFFICE O/P EST LOW 20 MIN: CPT | Mod: 25,,, | Performed by: NURSE PRACTITIONER

## 2025-05-18 PROCEDURE — 96372 THER/PROPH/DIAG INJ SC/IM: CPT | Mod: ,,, | Performed by: NURSE PRACTITIONER

## 2025-05-18 RX ORDER — DEXAMETHASONE SODIUM PHOSPHATE 10 MG/ML
10 INJECTION INTRAMUSCULAR; INTRAVENOUS
Status: COMPLETED | OUTPATIENT
Start: 2025-05-18 | End: 2025-05-18

## 2025-05-18 RX ORDER — TRIAMCINOLONE ACETONIDE 1 MG/G
CREAM TOPICAL
COMMUNITY
Start: 2025-03-26

## 2025-05-18 RX ORDER — HYDROQUINONE 40 MG/G
CREAM TOPICAL
COMMUNITY
Start: 2025-03-26

## 2025-05-18 RX ORDER — BENZOYL PEROXIDE 100 MG/ML
LIQUID TOPICAL
COMMUNITY
Start: 2025-03-26

## 2025-05-18 RX ORDER — MELOXICAM 15 MG/1
15 TABLET ORAL DAILY
Qty: 15 TABLET | Refills: 0 | Status: SHIPPED | OUTPATIENT
Start: 2025-05-18 | End: 2025-06-02

## 2025-05-18 RX ADMIN — DEXAMETHASONE SODIUM PHOSPHATE 10 MG: 10 INJECTION INTRAMUSCULAR; INTRAVENOUS at 10:05

## 2025-05-18 NOTE — PROGRESS NOTES
"Subjective:      Patient ID: Jadyn Wilder is a 55 y.o. female.    Vitals:  height is 5' 5" (1.651 m) and weight is 87.5 kg (193 lb). Her oral temperature is 97.8 °F (36.6 °C). Her blood pressure is 113/80 and her pulse is 81. Her respiration is 18 and oxygen saturation is 98%.     Chief Complaint: Ankle Pain     Patient is a 55 y.o. female who presents to urgent care with complaints of Woke up unable to bear weight on my right ankle. Broke ankle almost 3yrs ago. Plates and rods surgically implanted. Started this morning. Alleviating factors include Tylenol, Mobic with no relief. Patient denies recent injury.        Constitution: Negative.   HENT: Negative.     Neck: neck negative.   Cardiovascular: Negative.    Eyes: Negative.    Respiratory: Negative.     Gastrointestinal: Negative.    Endocrine: negative.   Genitourinary: Negative.    Musculoskeletal:  Positive for pain and joint pain.   Skin: Negative.    Allergic/Immunologic: Negative.    Neurological: Negative.    Hematologic/Lymphatic: Negative.    Psychiatric/Behavioral: Negative.        Objective:     Physical Exam   Constitutional: She is oriented to person, place, and time. She appears well-developed. She is cooperative. She does not appear ill. obesity  HENT:   Head: Normocephalic and atraumatic.   Ears:   Right Ear: Hearing, tympanic membrane, external ear and ear canal normal.   Left Ear: Hearing, tympanic membrane, external ear and ear canal normal.   Nose: Nose normal. No mucosal edema, rhinorrhea, nasal deformity or congestion. No epistaxis. Right sinus exhibits no maxillary sinus tenderness and no frontal sinus tenderness. Left sinus exhibits no maxillary sinus tenderness and no frontal sinus tenderness.   Mouth/Throat: Uvula is midline, oropharynx is clear and moist and mucous membranes are normal. Mucous membranes are moist. No trismus in the jaw. Normal dentition. No uvula swelling. No oropharyngeal exudate.   Eyes: Conjunctivae and lids are " normal.   Neck: Trachea normal and phonation normal. Neck supple.   Cardiovascular: Normal rate, regular rhythm, normal heart sounds and normal pulses.   Pulmonary/Chest: Effort normal and breath sounds normal. No respiratory distress.   Abdominal: Normal appearance and bowel sounds are normal. Soft.   Musculoskeletal: Normal range of motion.         General: Normal range of motion.      Right ankle: Tenderness.        Legs:    Lymphadenopathy:     She has no cervical adenopathy.   Neurological: no focal deficit. She is alert and oriented to person, place, and time. She exhibits normal muscle tone.   Skin: Skin is warm, dry and intact. Capillary refill takes less than 2 seconds.   Psychiatric: Her speech is normal and behavior is normal. Judgment and thought content normal.   Nursing note and vitals reviewed.  Advised patient that x-ray was negative but xray will be read by a radiologist at the hospital.  If any discrepancies were noted we will call you with the results.    EXAMINATION:  XR ANKLE COMPLETE 3 VIEW RIGHT     CLINICAL HISTORY:  Pain in right ankle and joints of right foot     TECHNIQUE:  AP, lateral, and oblique images of the right ankle were performed.     COMPARISON:  11/30/2022     FINDINGS:  Postsurgical changes of ORIF at the ankle.  Subtle fracture of the fibular hardware plate, likely present previously though slightly more conspicuous on today's exam.    Hardware appears engaged.  Alignment unchanged.  Degenerative change at the ankle.  No acute fracture seen.     Regional soft tissues are normal.     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     Postop ORIF with unchanged hardware as above        Electronically signed by:Lo Retana  Date:                                            05/18/2025  Time:                                           12:10     Previous History      Review of patient's allergies indicates:  No Known Allergies    Past  Medical History:   Diagnosis Date    Acute cystitis without hematuria 09/11/2023    Resolved      ADD (attention deficit disorder)     Anxiety     and depression    Bilirubinuria 09/11/2023    Obtain CMP      Binge eating 09/29/2023    Closed trimalleolar fracture of right ankle with routine healing 06/22/2022    COVID 12/20/2023    Depression     Finger pain, right 06/18/2023    Hyperlipidemia     Hypertension     right finger 06/18/2023    Screening for malignant neoplasm of colon     Segmental and somatic dysfunction     Spondylosis of cervical region without myelopathy or radiculopathy     Vitamin D deficiency 07/11/2023    Yeast infection 07/29/2024     Current Outpatient Medications   Medication Instructions    amlodipine-valsartan (EXFORGE) 5-160 mg per tablet 1 tablet, Oral, Daily    atorvastatin (LIPITOR) 10 mg, Oral, Daily    benzoyl peroxide (BENZAC AC) 10 % external wash Apply topically.    diclofenac (VOLTAREN) 75 mg, Oral, 2 times daily    famotidine (PEPCID) 40 mg, Oral, Daily    FLUoxetine 40 mg, Oral, Daily    hydroquinone 4 % Crea Apply topically.    lisdexamfetamine (VYVANSE) 50 mg, Oral, Every morning    meloxicam (MOBIC) 15 mg, Oral, Daily    mupirocin (BACTROBAN) 2 % ointment Topical (Top), 3 times daily    ondansetron (ZOFRAN-ODT) 8 mg, Oral, Every 12 hours PRN    pantoprazole (PROTONIX) 40 mg, Oral, 2 times daily    propranoloL (INDERAL) 10 mg, Oral, Daily PRN    semaglutide, weight loss, (WEGOVY) 1.7 mg/0.75 mL PnIj Inject 1 pen (1.7 mg) into the skin every 7 days.    triamcinolone acetonide 0.1% (KENALOG) 0.1 % cream Apply topically.     Past Surgical History:   Procedure Laterality Date    FRACTURE SURGERY  06/24/2022    Broken right ankle    KNEE ARTHROSCOPY      OPEN REDUCTION AND INTERNAL FIXATION (ORIF) OF INJURY OF ANKLE Right 06/23/2022    Procedure: ORIF, ANKLE-will start prone-Synthes;  Surgeon: Ezekiel Emery Jr., MD;  Location: [facility];  Service: Orthopedics;  Laterality: Right;  "   TUBAL LIGATION       Family History   Problem Relation Name Age of Onset    Diabetes Mother Vanessa Wilder     Hypertension Father Santos Wilder     Skin cancer Father Santos Wilder     No Known Problems Sister      No Known Problems Brother      Colon cancer Maternal Grandmother      Pancreatic cancer Maternal Grandfather         Social History[1]     Physical Exam      Vital Signs Reviewed   /80 (Patient Position: Sitting)   Pulse 81   Temp 97.8 °F (36.6 °C) (Oral)   Resp 18   Ht 5' 5" (1.651 m)   Wt 87.5 kg (193 lb)   LMP  (LMP Unknown)   SpO2 98%   BMI 32.12 kg/m²        Procedures    Procedures     Labs     Results for orders placed or performed in visit on 05/01/25   Comprehensive Metabolic Panel    Collection Time: 05/01/25  7:46 AM   Result Value Ref Range    Sodium 140 136 - 145 mmol/L    Potassium 4.5 3.5 - 5.1 mmol/L    Chloride 103 98 - 107 mmol/L    CO2 28 22 - 29 mmol/L    Glucose 89 74 - 100 mg/dL    Blood Urea Nitrogen 12.8 9.8 - 20.1 mg/dL    Creatinine 0.71 0.55 - 1.02 mg/dL    Calcium 9.8 8.4 - 10.2 mg/dL    Protein Total 7.9 6.4 - 8.3 gm/dL    Albumin 4.2 3.5 - 5.0 g/dL    Globulin 3.7 (H) 2.4 - 3.5 gm/dL    Albumin/Globulin Ratio 1.1 1.1 - 2.0 ratio    Bilirubin Total 0.7 <=1.5 mg/dL    ALP 86 40 - 150 unit/L    ALT 21 0 - 55 unit/L    AST 21 11 - 45 unit/L    eGFR >60 mL/min/1.73/m2    Anion Gap 9.0 mEq/L    BUN/Creatinine Ratio 18    Lipid Panel    Collection Time: 05/01/25  7:46 AM   Result Value Ref Range    Cholesterol Total 177 <=200 mg/dL    HDL Cholesterol 59 35 - 60 mg/dL    Triglyceride 150 (H) 37 - 140 mg/dL    Cholesterol/HDL Ratio 3 0 - 5    Very Low Density Lipoprotein 30     LDL Cholesterol 88.00 50.00 - 140.00 mg/dL   TSH    Collection Time: 05/01/25  7:46 AM   Result Value Ref Range    TSH 1.010 0.350 - 4.940 uIU/mL   Hemoglobin A1C    Collection Time: 05/01/25  7:46 AM   Result Value Ref Range    Hemoglobin A1c 5.4 <=7.0 %    Estimated Average Glucose 108.3 mg/dL   T4, " Free    Collection Time: 05/01/25  7:46 AM   Result Value Ref Range    Thyroxine Free 1.06 0.70 - 1.48 ng/dL   Estradiol    Collection Time: 05/01/25  7:46 AM   Result Value Ref Range    Estradiol Level 29 pg/mL   FSH (Follicle Stimulating Hormone)Pediatrics    Collection Time: 05/01/25  7:46 AM   Result Value Ref Range    Follicle Stimulating Hormone 121.0 IU/L   Luteinizing Hormone    Collection Time: 05/01/25  7:46 AM   Result Value Ref Range    Luteinizing Hormone 32.30 mIU/mL   HIV 1/2 Ag/Ab (4th Gen)    Collection Time: 05/01/25  7:46 AM   Result Value Ref Range    HIV Nonreactive Nonreactive   Hepatitis C Antibody    Collection Time: 05/01/25  7:46 AM   Result Value Ref Range    Hep C Ab Interp Nonreactive Nonreactive   CBC with Differential    Collection Time: 05/01/25  7:46 AM   Result Value Ref Range    WBC 8.77 4.50 - 11.50 x10(3)/mcL    RBC 4.73 4.20 - 5.40 x10(6)/mcL    Hgb 13.6 12.0 - 16.0 g/dL    Hct 43.1 37.0 - 47.0 %    MCV 91.1 80.0 - 94.0 fL    MCH 28.8 27.0 - 31.0 pg    MCHC 31.6 (L) 33.0 - 36.0 g/dL    RDW 14.3 11.5 - 17.0 %    Platelet 283 130 - 400 x10(3)/mcL    MPV 11.6 (H) 7.4 - 10.4 fL    Neut % 52.9 %    Lymph % 35.2 %    Mono % 9.2 %    Eos % 1.9 %    Basophil % 0.6 %    Imm Grans % 0.2 %    Neut # 4.63 2.1 - 9.2 x10(3)/mcL    Lymph # 3.09 0.6 - 4.6 x10(3)/mcL    Mono # 0.81 0.1 - 1.3 x10(3)/mcL    Eos # 0.17 0 - 0.9 x10(3)/mcL    Baso # 0.05 <=0.2 x10(3)/mcL    Imm Gran # 0.02 0.00 - 0.04 x10(3)/mcL    NRBC% 0.0 %   Progesterone    Collection Time: 05/01/25  7:46 AM   Result Value Ref Range    Progesterone Level <0.5 ng/mL     Assessment:     1. Acute right ankle pain    2. Arthritis of right ankle        Plan:   Arthritis is pain or disease in one or more joints. There are many types of arthritis. Types such as rheumatoid arthritis cause inflammation in the joints. Other types wear away the cartilage between joints, such as osteoarthritis. This makes the bones of the joint rub together  when you move the joint. An infection from bacteria, a virus, or a fungus can also cause arthritis. Your symptoms may be constant, or symptoms may come and go. Arthritis often gets worse over time and can cause permanent joint damage.  DISCHARGE INSTRUCTIONS:  Call your doctor or rheumatologist if:  You have a fever and severe joint pain or swelling.  You cannot move the affected joint.  You have severe joint pain you cannot tolerate.  You have a new or worsening rash.  Your pain or swelling does not get better with treatment.  You have questions or concerns about your condition or care.  Medicines:  Acetaminophen decreases pain and fever. It is available without a doctor's order. Ask how much to take and how often to take it. Follow directions. Read the labels of all other medicines you are using to see if they also contain acetaminophen, or ask your doctor or pharmacist. Acetaminophen can cause liver damage if not taken correctly.  NSAIDs , such as ibuprofen, help decrease swelling, pain, and fever. This medicine is available with or without a doctor's order. NSAIDs can cause stomach bleeding or kidney problems in certain people. If you take blood thinner medicine, always ask your healthcare provider if NSAIDs are safe for you. Always read the medicine label and follow directions.  Steroids reduce swelling and pain.  Prescription pain medicine may be given. Ask your healthcare provider how to take this medicine safely. Some prescription pain medicines contain acetaminophen. Do not take other medicines that contain acetaminophen without talking to your healthcare provider. Too much acetaminophen may cause liver damage. Prescription pain medicine may cause constipation. Ask your healthcare provider how to prevent or treat constipation.  Take your medicine as directed. Contact your healthcare provider if you think your medicine is not helping or if you have side effects. Tell your provider if you are allergic to any  medicine. Keep a list of the medicines, vitamins, and herbs you take. Include the amounts, and when and why you take them. Bring the list or the pill bottles to follow-up visits. Carry your medicine list with you in case of an emergency.  Manage your symptoms:  Rest your painful joint so it can heal. Your healthcare provider may recommend crutches or a walker if the affected joint is in a leg.  Apply ice or heat to the joint. Both can help decrease swelling and pain. Ice may also help prevent tissue damage. Use an ice pack, or put crushed ice in a plastic bag. Cover it with a towel and place it on your joint for 15 to 20 minutes every hour or as directed. You can apply heat for 20 minutes every 2 hours. Heat treatment includes hot packs or heat lamps.  Elevate your joint. Elevation helps reduce swelling and pain. Raise your joint above the level of your heart as often as you can. Prop your painful joint on pillows to keep it above your heart comfortably.     Manage arthritis:  Talk to your healthcare providers about your arthritis medicines. Some medicines may only be needed when you have arthritis pain. You may need to take other medicines every day to prevent arthritis from getting worse. Your healthcare providers will help you understand all your medicines and when to take them. It is important to take the medicines as directed, even if you start to feel better. You can continue to have joint damage and inflammation even if you do not feel it.  Eat a variety of healthy foods. Healthy foods include fruits, vegetables, whole-grain breads, low-fat dairy products, beans, lean meats, and fish. Ask if you need to be on a special diet. A diet rich in calcium and vitamin D may decrease your risk of osteoporosis. Foods high in calcium include milk, cheese, broccoli, and tofu. Vitamin D may be found in meat, fish, fortified milk, cereal and bread. Ask if you need calcium or vitamin D supplements.        Go to physical or  occupational therapy as directed. A physical therapist can teach you exercises to improve flexibility and range of motion. You may also be shown non-weight-bearing exercises that are safe for your joints, such as swimming. Exercise can help keep your joints flexible and reduce pain. An occupational therapist can help you learn to do your daily activities when your joints are stiff or sore.  Maintain a healthy weight. Extra weight puts increased pressure on your joints. Ask your healthcare provider what you should weigh. If you need to lose weight, he or she can help you create a weight loss program. Weight loss can help reduce pain and increase your ability to do your activities.  Wear flat or low-heeled shoes. This will help decrease pain and reduce pressure on your ankle, knee, and hip joints.  Do not smoke. Nicotine and other chemicals in cigarettes and cigars can damage your bones and joints. Ask your healthcare provider for information if you currently smoke and need help to quit. E-cigarettes or smokeless tobacco still contain nicotine. Talk to your healthcare provider before you use these products.        Acute right ankle pain  -     X-Ray Ankle Complete Right; Future; Expected date: 2025    Arthritis of right ankle  -     dexAMETHasone injection 10 mg  -     meloxicam (MOBIC) 15 MG tablet; Take 1 tablet (15 mg total) by mouth once daily. for 15 days  Dispense: 15 tablet; Refill: 0                         [1]   Social History  Tobacco Use    Smoking status: Former     Types: Cigarettes     Start date:      Quit date:      Years since quittin.4     Passive exposure: Never    Smokeless tobacco: Never    Tobacco comments:     Quit >15yrs ago   Substance Use Topics    Alcohol use: Not Currently    Drug use: Never

## 2025-05-18 NOTE — PATIENT INSTRUCTIONS
Arthritis is pain or disease in one or more joints. There are many types of arthritis. Types such as rheumatoid arthritis cause inflammation in the joints. Other types wear away the cartilage between joints, such as osteoarthritis. This makes the bones of the joint rub together when you move the joint. An infection from bacteria, a virus, or a fungus can also cause arthritis. Your symptoms may be constant, or symptoms may come and go. Arthritis often gets worse over time and can cause permanent joint damage.  DISCHARGE INSTRUCTIONS:  Call your doctor or rheumatologist if:  You have a fever and severe joint pain or swelling.  You cannot move the affected joint.  You have severe joint pain you cannot tolerate.  You have a new or worsening rash.  Your pain or swelling does not get better with treatment.  You have questions or concerns about your condition or care.  Medicines:  Acetaminophen decreases pain and fever. It is available without a doctor's order. Ask how much to take and how often to take it. Follow directions. Read the labels of all other medicines you are using to see if they also contain acetaminophen, or ask your doctor or pharmacist. Acetaminophen can cause liver damage if not taken correctly.  NSAIDs , such as ibuprofen, help decrease swelling, pain, and fever. This medicine is available with or without a doctor's order. NSAIDs can cause stomach bleeding or kidney problems in certain people. If you take blood thinner medicine, always ask your healthcare provider if NSAIDs are safe for you. Always read the medicine label and follow directions.  Steroids reduce swelling and pain.  Prescription pain medicine may be given. Ask your healthcare provider how to take this medicine safely. Some prescription pain medicines contain acetaminophen. Do not take other medicines that contain acetaminophen without talking to your healthcare provider. Too much acetaminophen may cause liver damage. Prescription pain  medicine may cause constipation. Ask your healthcare provider how to prevent or treat constipation.  Take your medicine as directed. Contact your healthcare provider if you think your medicine is not helping or if you have side effects. Tell your provider if you are allergic to any medicine. Keep a list of the medicines, vitamins, and herbs you take. Include the amounts, and when and why you take them. Bring the list or the pill bottles to follow-up visits. Carry your medicine list with you in case of an emergency.  Manage your symptoms:  Rest your painful joint so it can heal. Your healthcare provider may recommend crutches or a walker if the affected joint is in a leg.  Apply ice or heat to the joint. Both can help decrease swelling and pain. Ice may also help prevent tissue damage. Use an ice pack, or put crushed ice in a plastic bag. Cover it with a towel and place it on your joint for 15 to 20 minutes every hour or as directed. You can apply heat for 20 minutes every 2 hours. Heat treatment includes hot packs or heat lamps.  Elevate your joint. Elevation helps reduce swelling and pain. Raise your joint above the level of your heart as often as you can. Prop your painful joint on pillows to keep it above your heart comfortably.     Manage arthritis:  Talk to your healthcare providers about your arthritis medicines. Some medicines may only be needed when you have arthritis pain. You may need to take other medicines every day to prevent arthritis from getting worse. Your healthcare providers will help you understand all your medicines and when to take them. It is important to take the medicines as directed, even if you start to feel better. You can continue to have joint damage and inflammation even if you do not feel it.  Eat a variety of healthy foods. Healthy foods include fruits, vegetables, whole-grain breads, low-fat dairy products, beans, lean meats, and fish. Ask if you need to be on a special diet. A diet  rich in calcium and vitamin D may decrease your risk of osteoporosis. Foods high in calcium include milk, cheese, broccoli, and tofu. Vitamin D may be found in meat, fish, fortified milk, cereal and bread. Ask if you need calcium or vitamin D supplements.        Go to physical or occupational therapy as directed. A physical therapist can teach you exercises to improve flexibility and range of motion. You may also be shown non-weight-bearing exercises that are safe for your joints, such as swimming. Exercise can help keep your joints flexible and reduce pain. An occupational therapist can help you learn to do your daily activities when your joints are stiff or sore.  Maintain a healthy weight. Extra weight puts increased pressure on your joints. Ask your healthcare provider what you should weigh. If you need to lose weight, he or she can help you create a weight loss program. Weight loss can help reduce pain and increase your ability to do your activities.  Wear flat or low-heeled shoes. This will help decrease pain and reduce pressure on your ankle, knee, and hip joints.  Do not smoke. Nicotine and other chemicals in cigarettes and cigars can damage your bones and joints. Ask your healthcare provider for information if you currently smoke and need help to quit. E-cigarettes or smokeless tobacco still contain nicotine. Talk to your healthcare provider before you use these products.

## 2025-05-21 DIAGNOSIS — E78.2 MIXED HYPERLIPIDEMIA: ICD-10-CM

## 2025-05-21 RX ORDER — ATORVASTATIN CALCIUM 10 MG/1
10 TABLET, FILM COATED ORAL DAILY
Qty: 90 TABLET | Refills: 3 | Status: SHIPPED | OUTPATIENT
Start: 2025-05-21 | End: 2026-05-21

## 2025-05-23 ENCOUNTER — OFFICE VISIT (OUTPATIENT)
Dept: ORTHOPEDICS | Facility: CLINIC | Age: 55
End: 2025-05-23
Payer: COMMERCIAL

## 2025-05-23 VITALS
HEART RATE: 101 BPM | HEIGHT: 65 IN | DIASTOLIC BLOOD PRESSURE: 80 MMHG | SYSTOLIC BLOOD PRESSURE: 109 MMHG | WEIGHT: 193.19 LBS | BODY MASS INDEX: 32.19 KG/M2

## 2025-05-23 DIAGNOSIS — M76.821 POSTERIOR TIBIAL TENDINITIS, RIGHT: ICD-10-CM

## 2025-05-23 DIAGNOSIS — Z87.81 STATUS POST ORIF OF FRACTURE OF ANKLE: Primary | ICD-10-CM

## 2025-05-23 DIAGNOSIS — Z98.890 STATUS POST ORIF OF FRACTURE OF ANKLE: Primary | ICD-10-CM

## 2025-05-23 RX ORDER — ERGOCALCIFEROL 1.25 MG/1
50000 CAPSULE ORAL
COMMUNITY

## 2025-05-23 RX ORDER — POLYPODIUM LEUCOTOMOS EXTRACT 240 MG
CAPSULE ORAL
COMMUNITY

## 2025-05-23 NOTE — PROGRESS NOTES
"Chief Complaint:   Chief Complaint   Patient presents with    Right Ankle - Pain     Right ankle pain, prior ORIF of the right ankle in 2022, seen in urgent care on 5/18/25, unexpected pain and had trouble walking Sunday- given an injection in the hip with relief, patient reports that she doesn't have the pain anymore but want to make sure everything okay       Consulting Physician: No ref. provider found    History of present illness:  6/23/22: Open reduction internal fixation of right trimalleolar ankle     she  is a pleasant 55 y.o. year old female  who returns today.  She had a 1 day history of right ankle pain.  The pain is located medially and posteriorly.  She had woken with the pain.  She did not sustain an injury.  She was initially seen at urgent care where she had an injection of steroids into her hip.  This resolved her pain.  She denies any numbness or tingling.  She has an upcoming trip to [place]    Past Medical History:   Diagnosis Date    Acute cystitis without hematuria 09/11/2023    Resolved      ADD (attention deficit disorder)     Anxiety     and depression    Bilirubinuria 09/11/2023    Obtain CMP      Binge eating 09/29/2023    Closed trimalleolar fracture of right ankle with routine healing 06/22/2022    COVID 12/20/2023    Depression     Finger pain, right 06/18/2023    Hyperlipidemia     Hypertension     Photosensitive contact dermatitis     patient states "allergic to the sun"    right finger 06/18/2023    Screening for malignant neoplasm of colon     Segmental and somatic dysfunction     Spondylosis of cervical region without myelopathy or radiculopathy     Vitamin D deficiency 07/11/2023    Yeast infection 07/29/2024       Past Surgical History:   Procedure Laterality Date    FRACTURE SURGERY  06/24/2022    Broken right ankle    KNEE ARTHROSCOPY Left     meniscus    OPEN REDUCTION AND INTERNAL FIXATION (ORIF) OF INJURY OF ANKLE Right 06/23/2022    Procedure: ORIF, ANKLE-will start " prone-Synthes;  Surgeon: Ezekiel Emery Jr., MD;  Location: [facility];  Service: Orthopedics;  Laterality: Right;    TUBAL LIGATION         Current Outpatient Medications   Medication Sig    amlodipine-valsartan (EXFORGE) 5-160 mg per tablet Take 1 tablet by mouth once daily.    atorvastatin (LIPITOR) 10 MG tablet Take 1 tablet (10 mg total) by mouth once daily.    diclofenac (VOLTAREN) 75 MG EC tablet Take 1 tablet (75 mg total) by mouth 2 (two) times daily.    ergocalciferol (VITAMIN D2) 50,000 unit Cap Take 50,000 Units by mouth every 7 days.    FLUoxetine 40 MG capsule Take 1 capsule (40 mg total) by mouth once daily.    lisdexamfetamine (VYVANSE) 50 MG capsule Take 1 capsule (50 mg total) by mouth every morning.    ondansetron (ZOFRAN-ODT) 8 MG TbDL Take 1 tablet (8 mg total) by mouth every 12 (twelve) hours as needed (nausea).    pantoprazole (PROTONIX) 20 MG tablet Take 2 tablets (40 mg total) by mouth 2 (two) times a day.    polypodium leucotomos extract (HELIOCARE) 240 mg Cap Take by mouth.    propranoloL (INDERAL) 10 MG tablet Take 1 tablet (10 mg total) by mouth daily as needed.    semaglutide, weight loss, (WEGOVY) 1.7 mg/0.75 mL PnIj Inject 1 pen (1.7 mg) into the skin every 7 days.    benzoyl peroxide (BENZAC AC) 10 % external wash Apply topically. (Patient not taking: Reported on 5/23/2025)    famotidine (PEPCID) 40 MG tablet Take 1 tablet (40 mg total) by mouth once daily. (Patient not taking: Reported on 5/23/2025)    hydroquinone 4 % Crea Apply topically. (Patient not taking: Reported on 5/23/2025)    meloxicam (MOBIC) 15 MG tablet Take 1 tablet (15 mg total) by mouth once daily. for 15 days (Patient not taking: Reported on 5/23/2025)    mupirocin (BACTROBAN) 2 % ointment Apply topically 3 (three) times daily. (Patient not taking: Reported on 5/23/2025)    triamcinolone acetonide 0.1% (KENALOG) 0.1 % cream Apply topically. (Patient not taking: Reported on 5/23/2025)     No current  "facility-administered medications for this visit.       Review of patient's allergies indicates:  No Known Allergies    Family History   Problem Relation Name Age of Onset    Diabetes Mother Vanessa Wilder     Hypertension Father Santos Wilder     Skin cancer Father Santos Wilder     No Known Problems Sister      No Known Problems Brother      Colon cancer Maternal Grandmother      Pancreatic cancer Maternal Grandfather         Social History[1]    Review of Systems:    Constitution:   Denies chills, fever, and sweats.  HENT:   Denies headaches or blurry vision.  Cardiovascular:  Denies chest pain or irregular heart beat.  Respiratory:   Denies cough or shortness of breath.  Gastrointestinal:  Denies abdominal pain, nausea, or vomiting.  Musculoskeletal:   Denies muscle cramps.  Neurological:   Denies dizziness or focal weakness.  Psychiatric/Behavior: Normal mental status.  Hematology/Lymph:  Denies bleeding problem or easy bruising/bleeding.  Skin:    Denies rash or suspicious lesions.    Examination:    Vital Signs:    Vitals:    05/23/25 0904   BP: 109/80   Pulse: 101   Weight: 87.6 kg (193 lb 3.2 oz)   Height: 5' 5" (1.651 m)   PainSc: 0-No pain   PainLoc: Ankle       Body mass index is 32.15 kg/m².    Constitution:   Well-developed, well nourished patient in no acute distress.  Neurological:   Alert and oriented x 3 and cooperative to examination.     Psychiatric/Behavior: Normal mental status.  Respiratory:   No shortness of breath.  Cards:    Pulses palpable and symmetric, brisk cap refill   Eyes:    Extraoccular muscles intact  Skin:    No scars, rash or suspicious lesions.    MSK:   Her incisions healed.  Her range of motion is nearly full.  She does have some mild tenderness over the posterior tibial tendon.  She does have flatfoot deformity.  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  Her hardware is stable.  Prior partial distal fibula plate " crack is unchanged.         Assessment: Status post ORIF of fracture of ankle    Posterior tibial tendinitis, right        Plan:  She is going to continue the Mobic intermittently.  If her pain recurs will consider formal physical therapy         [1]   Social History  Socioeconomic History    Marital status: Single   Occupational History    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   Tobacco Use    Smoking status: Former     Types: Cigarettes     Start date:      Quit date:      Years since quittin.4     Passive exposure: Never    Smokeless tobacco: Never    Tobacco comments:     Quit >15yrs ago   Substance and Sexual Activity    Alcohol use: Not Currently    Drug use: Never    Sexual activity: Yes     Partners: Male     Birth control/protection: See Surgical Hx     Comment: valerie   Social History Narrative    Has granddaughter 5mos olf     Social Drivers of Health     Financial Resource Strain: Low Risk  (2025)    Overall Financial Resource Strain (CARDIA)     Difficulty of Paying Living Expenses: Not very hard   Food Insecurity: No Food Insecurity (2025)    Hunger Vital Sign     Worried About Running Out of Food in the Last Year: Never true     Ran Out of Food in the Last Year: Never true   Transportation Needs: No Transportation Needs (2025)    PRAPARE - Transportation     Lack of Transportation (Medical): No     Lack of Transportation (Non-Medical): No   Physical Activity: Insufficiently Active (2025)    Exercise Vital Sign     Days of Exercise per Week: 2 days     Minutes of Exercise per Session: 30 min   Stress: Stress Concern Present (2025)    [employer] [place] [employer] - Occupational Stress Questionnaire     Feeling of Stress : To some extent   Housing Stability: Low Risk  (2025)    Housing Stability Vital Sign     Unable to Pay for Housing in the Last Year: No     Number of Times Moved in the Last Year: 0     Homeless in the Last Year: No

## 2025-05-27 ENCOUNTER — OFFICE VISIT (OUTPATIENT)
Dept: INTERNAL MEDICINE | Facility: CLINIC | Age: 55
End: 2025-05-27
Payer: COMMERCIAL

## 2025-05-27 DIAGNOSIS — E66.811 OBESITY, CLASS I, BMI 30.0-34.9 (SEE ACTUAL BMI): Primary | ICD-10-CM

## 2025-05-27 PROCEDURE — 99499 UNLISTED E&M SERVICE: CPT | Mod: 95,,,

## 2025-05-27 RX ORDER — SEMAGLUTIDE 1.7 MG/.75ML
1.7 INJECTION, SOLUTION SUBCUTANEOUS
Qty: 3 ML | Refills: 5 | Status: ACTIVE | OUTPATIENT
Start: 2025-05-27

## 2025-05-27 NOTE — PROGRESS NOTES
Patient ID: Jadyn Wilder is a 55 y.o.    Black or   Other female    Subjective  Chief Complaint: patient presents for medical weight loss management.    Co-morbidities: HTN, DLD, prediabetes    HPI: Patient [facility] with [facility] in July 2024 and is currently managed on Wegovy 1.7 mg.     Tolerance to current therapy:  Denies nausea, vomiting, diarrhea, constipation, abdominal pain    Weight loss history:   Starting weight:    7/16/2024   Recent Readings    Weight (lbs) 237 lb    BMI 39.43 BMI    Current weight:    5/11/2025   Recent Readings    Weight (lbs) 193.4 lb    BMI 32.18 BMI    % weight loss since GLP-1 initiation: 18.4 %    Objective  Lab Results   Component Value Date     05/01/2025     07/26/2024     09/20/2023     Lab Results   Component Value Date    K 4.5 05/01/2025    K 4.8 07/26/2024    K 4.6 09/20/2023     Lab Results   Component Value Date     05/01/2025     07/26/2024     09/20/2023     Lab Results   Component Value Date    CO2 28 05/01/2025    CO2 29 07/26/2024    CO2 25 09/20/2023     Lab Results   Component Value Date    BUN 12.8 05/01/2025    BUN 14.4 07/26/2024    BUN 12.1 09/20/2023     Lab Results   Component Value Date    GLU 89 05/01/2025    GLU 87 07/26/2024    GLU 94 09/20/2023     Lab Results   Component Value Date    CALCIUM 9.8 05/01/2025    CALCIUM 9.6 07/26/2024    CALCIUM 9.3 09/20/2023     Lab Results   Component Value Date    PROT 7.9 05/01/2025    PROT 7.2 07/26/2024    PROT 7.4 09/20/2023     Lab Results   Component Value Date    ALBUMIN 4.2 05/01/2025    ALBUMIN 3.9 07/26/2024    ALBUMIN 4.0 09/20/2023     Lab Results   Component Value Date    BILITOT 0.7 05/01/2025    BILITOT 0.4 07/26/2024    BILITOT 0.6 09/20/2023     Lab Results   Component Value Date    AST 21 05/01/2025    AST 19 07/26/2024    AST 18 09/20/2023     Lab Results   Component Value Date    ALT 21 05/01/2025    ALT 28 07/26/2024     ALT 19 09/20/2023     Lab Results   Component Value Date    CREATININE 0.71 05/01/2025    CREATININE 0.78 07/26/2024    CREATININE 0.70 09/20/2023     Lab Results   Component Value Date    EGFRNORACEVR >60 05/01/2025    EGFRNORACEVR >60 07/26/2024    EGFRNORACEVR >60 09/20/2023     Assessment/Plan  - Continue Wegovy 1.7 mg SQ weekly  - RTC in 6 months for follow-up evaluation     Patient consented to pharmacist management via collaborative practice.

## 2025-06-04 ENCOUNTER — PATIENT MESSAGE (OUTPATIENT)
Dept: PRIMARY CARE CLINIC | Facility: CLINIC | Age: 55
End: 2025-06-04
Payer: COMMERCIAL

## 2025-06-04 DIAGNOSIS — F98.8 ATTENTION DEFICIT DISORDER, UNSPECIFIED TYPE: ICD-10-CM

## 2025-06-04 RX ORDER — LISDEXAMFETAMINE DIMESYLATE 50 MG/1
50 CAPSULE ORAL EVERY MORNING
Qty: 30 CAPSULE | Refills: 0 | Status: SHIPPED | OUTPATIENT
Start: 2025-06-04

## 2025-06-04 NOTE — TELEPHONE ENCOUNTER
Refills sent.  Patient will need appointment for any more refills.   reviewed without discrepancies

## 2025-06-19 DIAGNOSIS — K21.9 GASTROESOPHAGEAL REFLUX DISEASE, UNSPECIFIED WHETHER ESOPHAGITIS PRESENT: ICD-10-CM

## 2025-06-19 RX ORDER — PANTOPRAZOLE SODIUM 20 MG/1
40 TABLET, DELAYED RELEASE ORAL 2 TIMES DAILY
Qty: 60 TABLET | Refills: 3 | Status: SHIPPED | OUTPATIENT
Start: 2025-06-19 | End: 2025-06-20 | Stop reason: SDUPTHER

## 2025-06-20 DIAGNOSIS — K21.9 GASTROESOPHAGEAL REFLUX DISEASE, UNSPECIFIED WHETHER ESOPHAGITIS PRESENT: ICD-10-CM

## 2025-06-20 RX ORDER — PANTOPRAZOLE SODIUM 20 MG/1
40 TABLET, DELAYED RELEASE ORAL 2 TIMES DAILY
Qty: 60 TABLET | Refills: 3 | Status: SHIPPED | OUTPATIENT
Start: 2025-06-20

## 2025-06-23 ENCOUNTER — PATIENT MESSAGE (OUTPATIENT)
Dept: ADMINISTRATIVE | Facility: OTHER | Age: 55
End: 2025-06-23
Payer: COMMERCIAL

## 2025-06-24 ENCOUNTER — OFFICE VISIT (OUTPATIENT)
Dept: PRIMARY CARE CLINIC | Facility: CLINIC | Age: 55
End: 2025-06-24
Payer: COMMERCIAL

## 2025-06-24 VITALS
WEIGHT: 193 LBS | OXYGEN SATURATION: 99 % | HEART RATE: 82 BPM | BODY MASS INDEX: 32.15 KG/M2 | RESPIRATION RATE: 16 BRPM | DIASTOLIC BLOOD PRESSURE: 79 MMHG | TEMPERATURE: 98 F | HEIGHT: 65 IN | SYSTOLIC BLOOD PRESSURE: 113 MMHG

## 2025-06-24 DIAGNOSIS — I10 PRIMARY HYPERTENSION: ICD-10-CM

## 2025-06-24 DIAGNOSIS — R22.32 ELBOW MASS, LEFT: ICD-10-CM

## 2025-06-24 DIAGNOSIS — F98.8 ATTENTION DEFICIT DISORDER, UNSPECIFIED TYPE: Primary | ICD-10-CM

## 2025-06-24 PROCEDURE — 1159F MED LIST DOCD IN RCRD: CPT | Mod: CPTII,,, | Performed by: STUDENT IN AN ORGANIZED HEALTH CARE EDUCATION/TRAINING PROGRAM

## 2025-06-24 PROCEDURE — 3078F DIAST BP <80 MM HG: CPT | Mod: CPTII,,, | Performed by: STUDENT IN AN ORGANIZED HEALTH CARE EDUCATION/TRAINING PROGRAM

## 2025-06-24 PROCEDURE — 3074F SYST BP LT 130 MM HG: CPT | Mod: CPTII,,, | Performed by: STUDENT IN AN ORGANIZED HEALTH CARE EDUCATION/TRAINING PROGRAM

## 2025-06-24 PROCEDURE — 3008F BODY MASS INDEX DOCD: CPT | Mod: CPTII,,, | Performed by: STUDENT IN AN ORGANIZED HEALTH CARE EDUCATION/TRAINING PROGRAM

## 2025-06-24 PROCEDURE — 99214 OFFICE O/P EST MOD 30 MIN: CPT | Mod: ,,, | Performed by: STUDENT IN AN ORGANIZED HEALTH CARE EDUCATION/TRAINING PROGRAM

## 2025-06-24 PROCEDURE — 3044F HG A1C LEVEL LT 7.0%: CPT | Mod: CPTII,,, | Performed by: STUDENT IN AN ORGANIZED HEALTH CARE EDUCATION/TRAINING PROGRAM

## 2025-06-24 PROCEDURE — 4010F ACE/ARB THERAPY RXD/TAKEN: CPT | Mod: CPTII,,, | Performed by: STUDENT IN AN ORGANIZED HEALTH CARE EDUCATION/TRAINING PROGRAM

## 2025-06-24 PROCEDURE — 1160F RVW MEDS BY RX/DR IN RCRD: CPT | Mod: CPTII,,, | Performed by: STUDENT IN AN ORGANIZED HEALTH CARE EDUCATION/TRAINING PROGRAM

## 2025-06-24 RX ORDER — LISDEXAMFETAMINE DIMESYLATE 40 MG/1
40 CAPSULE ORAL DAILY
Qty: 30 CAPSULE | Refills: 0 | Status: SHIPPED | OUTPATIENT
Start: 2025-06-24

## 2025-06-24 RX ORDER — VALSARTAN 160 MG/1
160 TABLET ORAL DAILY
Qty: 90 TABLET | Refills: 3 | Status: SHIPPED | OUTPATIENT
Start: 2025-06-24 | End: 2026-06-24

## 2025-06-24 NOTE — ASSESSMENT & PLAN NOTE
Body mass index is 38.89 kg/m².  Improving  Continue Wegovy 1.7mg qwk per DMP. AE previously discussed  Consider bariatric surgery if BMI >40 or >35 with comorbidities. Referral sent and appt pending  Recommend intensive, multicomponent, behavioral interventions:  Goal BMI <30.  Goal is to exercise at least 5 times a week for 30 minutes per day.   -Stand more each day.   -Increase exercise: Start with 10 minutes daily and build up to 60-90 minutes/day with moderate activity  Reduce caloric intake:  -Women: 8970-9004 kcal/day  -Men: 0491-8485 kcal/day  Avoid soda, simple sugars, excessive rice, potatoes or bread. Limit fast foods and fried foods.  Choose complex carbs in moderation (example: green vegetables, beans, oatmeal). Eat plenty of fresh fruits and vegetables with lean meats daily.  Do not skip meals. Eat a balanced portion size.  Avoid fad diets. Consider long-term healthy life style changes.

## 2025-06-24 NOTE — ASSESSMENT & PLAN NOTE
More irritable and anxious  Decrease Vyvanse to 40 mg daily.  AE discussed  Controlled substance agreement signed 5/2024  /narx care checked without discrepancies noted  1-month medication refills electronically sent. Pt will request for more if working well  Quarterly visits required for continued prescriptions with minimum annual wellness exam  Discussed adverse effects that include but not limited to cardiovascular event, stroke, heart attack, death

## 2025-06-24 NOTE — ASSESSMENT & PLAN NOTE
Pt hypotensive at times  Stop Exforge 5-160mg qd and start Valsartan 160mg qd, continue Propranolol 10mg qd prn  Recent elevation likely 2/2 stress/anxiety  Continue to monitor for sx improvement  Blood pressure at goal <140/90 (<130/80 if otherwise noted)  Recommend DASH diet  Avoid tobacco use  Record BP at home daily and bring log to next office visit, assure that home cuff is calibrated at minimum every 12 months

## 2025-06-24 NOTE — PROGRESS NOTES
Date: 06/24/2025  Patient ID: [id number]   Chief Complaint: Mass (L elbow, Pt states noticed 2 weeks ago no pain no decrease in ROM.) and Med Change Request (Pt requesing Decrease vyvanse dosage)    HPI:   Jadyn Wilder is a 55 y.o. female here today for Mass (L elbow, Pt states noticed 2 weeks ago no pain no decrease in ROM.) and Med Change Request (Pt requesing Decrease vyvanse dosage)      History of Present Illness    Ms. Wilder presents today for evaluation of a mass on left elbow. She noticed a mass on the left elbow approximately 2 weeks ago, describing it as soft, movable, and approximately 1 cm in size. She denies any associated pain or history of injury to the area. She experiences intermittent dizziness and lightheadedness with blood pressure medication (Exforge). During symptomatic periods, her self-monitored blood pressure readings have been as low as 95/68. She occasionally skips medication doses when feeling lightheaded. She has lost almost 20 lbs since January with Wegovy medication. She takes fluoxetine 20 mg nightly, stable for past two years. She reports Vyvanse causing increased anxiety and irritability. She expresses preference for minimal medication intervention. Triglycerides are elevated but significantly improved compared to previous testing, likely due to lifestyle modifications.      ROS:  Musculoskeletal: +lumps/masses  Neurological: +dizziness, +lightheadedness  Psychiatric: +anxiety           Problem List[1]  Outpatient Medications Marked as Taking for the 6/24/25 encounter (Office Visit) with Jazmín Guerra MD   Medication Sig Dispense Refill    atorvastatin (LIPITOR) 10 MG tablet Take 1 tablet (10 mg total) by mouth once daily. 90 tablet 3    diclofenac (VOLTAREN) 75 MG EC tablet Take 1 tablet (75 mg total) by mouth 2 (two) times daily. 60 tablet 11    ergocalciferol (VITAMIN D2) 50,000 unit Cap Take 50,000 Units by mouth every 7 days.      FLUoxetine 40 MG capsule Take 1 capsule (40  "mg total) by mouth once daily. 910 capsule 3    ondansetron (ZOFRAN-ODT) 8 MG TbDL Take 1 tablet (8 mg total) by mouth every 12 (twelve) hours as needed (nausea). 10 tablet 0    pantoprazole (PROTONIX) 20 MG tablet Take 2 tablets (40 mg total) by mouth 2 (two) times a day. 60 tablet 3    polypodium leucotomos extract (HELIOCARE) 240 mg Cap Take by mouth.      propranoloL (INDERAL) 10 MG tablet Take 1 tablet (10 mg total) by mouth daily as needed. 30 tablet 11    semaglutide, weight loss, (WEGOVY) 1.7 mg/0.75 mL PnIj Inject 1.7 mg into the skin every 7 days. 3 mL 5    [DISCONTINUED] amlodipine-valsartan (EXFORGE) 5-160 mg per tablet Take 1 tablet by mouth once daily. 90 tablet 3    [DISCONTINUED] lisdexamfetamine (VYVANSE) 50 MG capsule Take 1 capsule (50 mg total) by mouth every morning. 30 capsule 0     Past Medical History:   Diagnosis Date    Acute cystitis without hematuria 09/11/2023    Resolved      ADD (attention deficit disorder)     Anxiety     and depression    Bilirubinuria 09/11/2023    Obtain CMP      Binge eating 09/29/2023    Closed trimalleolar fracture of right ankle with routine healing 06/22/2022    COVID 12/20/2023    Depression     Finger pain, right 06/18/2023    Hyperlipidemia     Hypertension     Photosensitive contact dermatitis     patient states "allergic to the sun"    right finger 06/18/2023    Screening for malignant neoplasm of colon     Segmental and somatic dysfunction     Spondylosis of cervical region without myelopathy or radiculopathy     Vitamin D deficiency 07/11/2023    Yeast infection 07/29/2024     Past Surgical History:   Procedure Laterality Date    FRACTURE SURGERY  06/24/2022    Broken right ankle    KNEE ARTHROSCOPY Left     meniscus    OPEN REDUCTION AND INTERNAL FIXATION (ORIF) OF INJURY OF ANKLE Right 06/23/2022    Procedure: ORIF, ANKLE-will start prone-Synthes;  Surgeon: Ezekiel Emery Jr., MD;  Location: [facility];  Service: Orthopedics;  Laterality: Right;    TUBAL " "LIGATION       Review of patient's allergies indicates:  No Known Allergies  Family History   Problem Relation Name Age of Onset    Diabetes Mother Vanessa Wilder     Hypertension Father Santos Wilder     Skin cancer Father Santos Wilder     No Known Problems Sister      No Known Problems Brother      Colon cancer Maternal Grandmother      Pancreatic cancer Maternal Grandfather        Social History[2]  Patient Care Team:  Jazmín Guerra MD as PCP - General (Family Medicine)  Nida Waldrop MD as Hypertension Digital Medicine Responsible Provider (Family Medicine)  Maryuri Andersen, PharmD as Hypertension Digital Medicine Clinician (Pharmacist)  1, [employer] [facility] as Hypertension Digital Medicine Contract  Jazmín Guerra MD as Hyperlipidemia Digital Medicine Responsible Provider (Family Medicine)  Maryuri Andersen PharmD as Hyperlipidemia Digital Medicine Clinician (Pharmacist)  Talat Paez, PharmD as Pharmacist   Subjective:   ROS  See HPI for details  All Other ROS: Negative except as stated in HPI.   Objective:   /79   Pulse 82   Temp 98 °F (36.7 °C) (Oral)   Resp 16   Ht 5' 5" (1.651 m)   Wt 87.5 kg (193 lb)   LMP  (LMP Unknown)   SpO2 99%   BMI 32.12 kg/m²   Physical Exam  Constitutional:       General: She is not in acute distress.  Musculoskeletal:      Comments: Soft <1cm subcutaneous mobile mass on medial aspect of L elbow without skin discoloration   Neurological:      Mental Status: She is alert.       Assessment:       ICD-10-CM ICD-9-CM   1. Attention deficit disorder, unspecified type  F98.8 314.00   2. Elbow mass, left  R22.32 719.62   3. Primary hypertension  I10 401.9      Plan:   1. Attention deficit disorder, unspecified type  Assessment & Plan:  More irritable and anxious  Decrease Vyvanse to 40 mg daily.  AE discussed  Controlled substance agreement signed 5/2024  /narx care checked without discrepancies noted  1-month medication refills electronically sent. " Pt will request for more if working well  Quarterly visits required for continued prescriptions with minimum annual wellness exam  Discussed adverse effects that include but not limited to cardiovascular event, stroke, heart attack, death      Orders:  -     lisdexamfetamine (VYVANSE) 40 MG Cap; Take 1 capsule (40 mg total) by mouth once daily.  Dispense: 30 capsule; Refill: 0    2. Elbow mass, left  Assessment & Plan:  Likely lipoma  Cont to monitor  If worsen, consider US      3. Primary hypertension  Assessment & Plan:  Pt hypotensive at times  Stop Exforge 5-160mg qd and start Valsartan 160mg qd, continue Propranolol 10mg qd prn  Recent elevation likely 2/2 stress/anxiety  Continue to monitor for sx improvement  Blood pressure at goal <140/90 (<130/80 if otherwise noted)  Recommend DASH diet  Avoid tobacco use  Record BP at home daily and bring log to next office visit, assure that home cuff is calibrated at minimum every 12 months      Orders:  -     valsartan (DIOVAN) 160 MG tablet; Take 1 tablet (160 mg total) by mouth once daily.  Dispense: 90 tablet; Refill: 3         Medication List with Changes/Refills   New Medications    LISDEXAMFETAMINE (VYVANSE) 40 MG CAP    Take 1 capsule (40 mg total) by mouth once daily.       Start Date: 6/24/2025 End Date: --    VALSARTAN (DIOVAN) 160 MG TABLET    Take 1 tablet (160 mg total) by mouth once daily.       Start Date: 6/24/2025 End Date: 6/24/2026   Current Medications    ATORVASTATIN (LIPITOR) 10 MG TABLET    Take 1 tablet (10 mg total) by mouth once daily.       Start Date: 5/21/2025 End Date: 5/21/2026    BENZOYL PEROXIDE (BENZAC AC) 10 % EXTERNAL WASH    Apply topically.       Start Date: 3/26/2025 End Date: --    DICLOFENAC (VOLTAREN) 75 MG EC TABLET    Take 1 tablet (75 mg total) by mouth 2 (two) times daily.       Start Date: 10/16/2024End Date: --    ERGOCALCIFEROL (VITAMIN D2) 50,000 UNIT CAP    Take 50,000 Units by mouth every 7 days.       Start Date: --         End Date: --    FAMOTIDINE (PEPCID) 40 MG TABLET    Take 1 tablet (40 mg total) by mouth once daily.       Start Date: 8/29/2024 End Date: 8/29/2025    FLUOXETINE 40 MG CAPSULE    Take 1 capsule (40 mg total) by mouth once daily.       Start Date: 3/19/2025 End Date: --    HYDROQUINONE 4 % CREA    Apply topically.       Start Date: 3/26/2025 End Date: --    MUPIROCIN (BACTROBAN) 2 % OINTMENT    Apply topically 3 (three) times daily.       Start Date: 8/10/2023 End Date: --    ONDANSETRON (ZOFRAN-ODT) 8 MG TBDL    Take 1 tablet (8 mg total) by mouth every 12 (twelve) hours as needed (nausea).       Start Date: 3/19/2025 End Date: --    PANTOPRAZOLE (PROTONIX) 20 MG TABLET    Take 2 tablets (40 mg total) by mouth 2 (two) times a day.       Start Date: 6/20/2025 End Date: --    POLYPODIUM LEUCOTOMOS EXTRACT (HELIOCARE) 240 MG CAP    Take by mouth.       Start Date: --        End Date: --    PROPRANOLOL (INDERAL) 10 MG TABLET    Take 1 tablet (10 mg total) by mouth daily as needed.       Start Date: 11/21/2024End Date: --    SEMAGLUTIDE, WEIGHT LOSS, (WEGOVY) 1.7 MG/0.75 ML PNIJ    Inject 1.7 mg into the skin every 7 days.       Start Date: 5/27/2025 End Date: --    TRIAMCINOLONE ACETONIDE 0.1% (KENALOG) 0.1 % CREAM    Apply topically.       Start Date: 3/26/2025 End Date: --   Discontinued Medications    AMLODIPINE-VALSARTAN (EXFORGE) 5-160 MG PER TABLET    Take 1 tablet by mouth once daily.       Start Date: 11/21/2024End Date: 6/24/2025    LISDEXAMFETAMINE (VYVANSE) 50 MG CAPSULE    Take 1 capsule (50 mg total) by mouth every morning.       Start Date: 6/4/2025  End Date: 6/24/2025        Follow up in about 3 months (around 9/24/2025) for Annual. In addition to their scheduled follow up, the patient has also been instructed to follow up on as needed basis.   This note was created using [employer] voice recognition software that occasionally misinterpreted phrases or words. Please contact me if any questions may rise  regarding documentation to clarify verbiage.   This note was generated with the assistance of ambient listening technology. Verbal consent was obtained by the patient and accompanying visitor(s) for the recording of patient appointment to facilitate this note. I attest to having reviewed and edited the generated note for accuracy, though some syntax or spelling errors may persist. Please contact the author of this note for any clarification.             [1]   Patient Active Problem List  Diagnosis    Wellness examination    Primary hypertension    Anxiety and depression    ADD (attention deficit disorder)    Spondylosis of cervical region without myelopathy or radiculopathy    Menopausal syndrome    Class 2 obesity with body mass index (BMI) of 38.0 to 38.9 in adult    Prediabetes    Mixed hyperlipidemia    Back pain    Binge eating    Supraventricular tachycardia    Leukocytosis    Gastroesophageal reflux disease    Tendinopathy of right shoulder    Elbow mass, left   [2]   Social History  Socioeconomic History    Marital status: Single   Occupational History    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   Tobacco Use    Smoking status: Former     Types: Cigarettes     Start date:      Quit date:      Years since quittin.5     Passive exposure: Never    Smokeless tobacco: Never    Tobacco comments:     Quit >15yrs ago   Substance and Sexual Activity    Alcohol use: Not Currently    Drug use: Never    Sexual activity: Yes     Partners: Male     Birth control/protection: See Surgical Hx     Comment: valerie   Social History Narrative    Has granddaughter 5mos olf     Social Drivers of Health     Financial Resource Strain: Low Risk  (2025)    Overall Financial Resource Strain (CARDIA)     Difficulty of Paying Living Expenses: Not very hard   Food Insecurity: No Food Insecurity (2025)    Hunger Vital Sign     Worried About Running Out of Food in the Last Year: Never true     Ran Out of Food in the Last Year: Never  true   Transportation Needs: No Transportation Needs (5/20/2025)    PRAPARE - Transportation     Lack of Transportation (Medical): No     Lack of Transportation (Non-Medical): No   Physical Activity: Insufficiently Active (5/20/2025)    Exercise Vital Sign     Days of Exercise per Week: 2 days     Minutes of Exercise per Session: 30 min   Stress: Stress Concern Present (5/20/2025)    [employer] - Occupational Stress Questionnaire     Feeling of Stress : To some extent   Housing Stability: Low Risk  (5/20/2025)    Housing Stability Vital Sign     Unable to Pay for Housing in the Last Year: No     Number of Times Moved in the Last Year: 0     Homeless in the Last Year: No

## 2025-07-14 ENCOUNTER — PATIENT MESSAGE (OUTPATIENT)
Dept: PRIMARY CARE CLINIC | Facility: CLINIC | Age: 55
End: 2025-07-14
Payer: COMMERCIAL

## 2025-07-14 DIAGNOSIS — F32.A ANXIETY AND DEPRESSION: Primary | ICD-10-CM

## 2025-07-14 DIAGNOSIS — F41.9 ANXIETY AND DEPRESSION: Primary | ICD-10-CM

## 2025-07-22 ENCOUNTER — PATIENT MESSAGE (OUTPATIENT)
Dept: ADMINISTRATIVE | Facility: OTHER | Age: 55
End: 2025-07-22
Payer: COMMERCIAL

## 2025-07-22 DIAGNOSIS — F98.8 ATTENTION DEFICIT DISORDER, UNSPECIFIED TYPE: ICD-10-CM

## 2025-07-22 RX ORDER — LISDEXAMFETAMINE DIMESYLATE 40 MG/1
40 CAPSULE ORAL DAILY
Qty: 30 CAPSULE | Refills: 0 | Status: SHIPPED | OUTPATIENT
Start: 2025-07-24

## 2025-07-29 ENCOUNTER — TELEPHONE (OUTPATIENT)
Dept: BEHAVIORAL HEALTH | Facility: CLINIC | Age: 55
End: 2025-07-29
Payer: COMMERCIAL

## 2025-07-29 NOTE — TELEPHONE ENCOUNTER
Spoke to patient  She refused the appt  Stated she contacted her former therapist and has decided to stick with her  Call ended  Jerrica Lock, MADDIE  7/29/2025, 2:46 PM

## 2025-07-29 NOTE — TELEPHONE ENCOUNTER
Copied from CRM #[id number]. Topic: General Inquiry - Patient Advice  >> Jul 28, 2025  1:53 PM Anette Rios wrote:  Who Called: Jadyn NASRIN Wilder    Caller is requesting assistance/information from provider's office.    Symptoms (please be specific): N/A   How long has patient had these symptoms:  N/A  List of preferred pharmacies on file (remove unneeded): [unfilled]  If different, enter pharmacy into here including location and phone number: N/A      Preferred Method of Contact: Phone Call  Patient's Preferred Phone Number on File: [phone]   Best Call Back Number, if different:  Additional Information: pt would like a call back to reschedule new pt appt w/ provider yeimy. Pt stated her pcp has already sent referral.

## 2025-08-11 DIAGNOSIS — K21.9 GASTROESOPHAGEAL REFLUX DISEASE, UNSPECIFIED WHETHER ESOPHAGITIS PRESENT: ICD-10-CM

## 2025-08-11 RX ORDER — PANTOPRAZOLE SODIUM 20 MG/1
40 TABLET, DELAYED RELEASE ORAL 2 TIMES DAILY
Qty: 60 TABLET | Refills: 3 | Status: SHIPPED | OUTPATIENT
Start: 2025-08-11 | End: 2025-08-13 | Stop reason: SDUPTHER

## 2025-08-13 DIAGNOSIS — K21.9 GASTROESOPHAGEAL REFLUX DISEASE, UNSPECIFIED WHETHER ESOPHAGITIS PRESENT: ICD-10-CM

## 2025-08-13 RX ORDER — PANTOPRAZOLE SODIUM 20 MG/1
40 TABLET, DELAYED RELEASE ORAL 2 TIMES DAILY
Qty: 60 TABLET | Refills: 3 | Status: SHIPPED | OUTPATIENT
Start: 2025-08-13

## 2025-08-15 ENCOUNTER — PATIENT MESSAGE (OUTPATIENT)
Dept: ADMINISTRATIVE | Facility: OTHER | Age: 55
End: 2025-08-15
Payer: COMMERCIAL

## 2025-08-25 DIAGNOSIS — F98.8 ATTENTION DEFICIT DISORDER, UNSPECIFIED TYPE: ICD-10-CM

## 2025-08-25 RX ORDER — LISDEXAMFETAMINE DIMESYLATE 40 MG/1
40 CAPSULE ORAL DAILY
Qty: 30 CAPSULE | Refills: 0 | Status: SHIPPED | OUTPATIENT
Start: 2025-08-25

## (undated) DEVICE — BANDAGE ACE ELASTIC 6"

## (undated) DEVICE — SOL IRRI STRL WATER 1000ML

## (undated) DEVICE — CUFF ATS 2 PORT SNGL BLDR 34IN

## (undated) DEVICE — SUT MONOCRYL 2-0 S UND

## (undated) DEVICE — SEE MEDLINE ITEM 157125

## (undated) DEVICE — PADDING CAST SOFT-ROLL 6 X 4

## (undated) DEVICE — COVER MAYO STAND REINFRCD 30

## (undated) DEVICE — SUT VICRYL BR 1 GEN 27 CT-1

## (undated) DEVICE — BIT DRILL 2.7MM

## (undated) DEVICE — PAD ABD 8X10 STERILE

## (undated) DEVICE — SNARE CAPTIFLEX MOF 27X2.4X28

## (undated) DEVICE — GLOVE PROTEXIS HYDROGEL SZ6.5

## (undated) DEVICE — DRAPE C-ARMOR EQUIPMENT COVER

## (undated) DEVICE — UNDERPAD DISPOSABLE 30X30IN

## (undated) DEVICE — TRAP SUCTION POLYP

## (undated) DEVICE — GLOVE PROTEXIS HYDROGEL SZ8

## (undated) DEVICE — COVER EQUIPMENT 36X25

## (undated) DEVICE — GLOVE PROTEXIS LTX MICRO 8

## (undated) DEVICE — 2.5 DRILL BIT

## (undated) DEVICE — DRESSING GAUZE XEROFORM 5X9

## (undated) DEVICE — KIT SURGICAL TURNOVER

## (undated) DEVICE — BIT DRILL 2.0MM D DEPTH 110MM

## (undated) DEVICE — APPLICATOR CHLORAPREP ORN 26ML

## (undated) DEVICE — PEROXIDE HYDROGEN 3% 16OZ

## (undated) DEVICE — COLLECTION SPECIMEN NEPTUNE

## (undated) DEVICE — DRAPE INCISE IOBAN 2 23X23IN

## (undated) DEVICE — BANDAGE VELCLOSE ELAS 4INX5YD

## (undated) DEVICE — SUT ETHILON 3-0 FS-1 30

## (undated) DEVICE — ELECTRODE PATIENT RETURN DISP

## (undated) DEVICE — TIP SUCTION YANKAUER

## (undated) DEVICE — DRESSING XEROFORM FOIL PK 1X8

## (undated) DEVICE — DRAPE ORTH TIBURON 77X108IN

## (undated) DEVICE — SOL NACL IRR 1000ML BTL

## (undated) DEVICE — TAPE SILK 3IN

## (undated) DEVICE — SPONGE GAUZE 16PLY 4X4

## (undated) DEVICE — Device

## (undated) DEVICE — KIT CANIST SUCTION 1200CC

## (undated) DEVICE — KIT SURGICAL COLON .25 1.1OZ

## (undated) DEVICE — PENCIL ELECSURG ROCKER 15FT

## (undated) DEVICE — TUBE SUCTION MEDI-VAC STERILE

## (undated) DEVICE — ADAPTER DUAL NSL LUER M-M 7FT

## (undated) DEVICE — GLOVE PROTEXIS NEU-THERA SZ6

## (undated) DEVICE — KWIRE NTHRD 1.25X150MM
Type: IMPLANTABLE DEVICE | Site: ANKLE | Status: NON-FUNCTIONAL
Removed: 2022-06-23

## (undated) DEVICE — GAUZE SPONGE 4X4 12PLY

## (undated) DEVICE — DRAPE U-DRAPE ADHESIVE 60X60IN

## (undated) DEVICE — DRAPE STERI U-SHAPED 47X51IN

## (undated) DEVICE — BIT DRILL 2.7.

## (undated) DEVICE — HANDPIECE ARGYLE YANKAUER 18FR

## (undated) DEVICE — CASTING TAPE FIBERGLASS XXLG

## (undated) DEVICE — GOWN SURGICAL XX LARGE X LONG